# Patient Record
Sex: MALE | Race: BLACK OR AFRICAN AMERICAN | Employment: FULL TIME | URBAN - METROPOLITAN AREA
[De-identification: names, ages, dates, MRNs, and addresses within clinical notes are randomized per-mention and may not be internally consistent; named-entity substitution may affect disease eponyms.]

---

## 2018-04-27 PROBLEM — I10 ESSENTIAL HYPERTENSION: Status: ACTIVE | Noted: 2018-04-27

## 2018-04-27 PROBLEM — J30.1 NON-SEASONAL ALLERGIC RHINITIS DUE TO POLLEN: Status: ACTIVE | Noted: 2018-04-27

## 2018-05-02 ENCOUNTER — HOSPITAL ENCOUNTER (OUTPATIENT)
Dept: GENERAL RADIOLOGY | Age: 28
Discharge: HOME | End: 2018-05-02
Attending: INTERNAL MEDICINE
Payer: COMMERCIAL

## 2018-05-02 DIAGNOSIS — J40 BRONCHITIS: ICD-10-CM

## 2018-05-02 PROCEDURE — 71046 X-RAY EXAM CHEST 2 VIEWS: CPT

## 2018-05-08 ENCOUNTER — APPOINTMENT (OUTPATIENT)
Dept: GENERAL RADIOLOGY | Age: 28
DRG: 974 | End: 2018-05-08
Attending: EMERGENCY MEDICINE
Payer: COMMERCIAL

## 2018-05-08 ENCOUNTER — APPOINTMENT (OUTPATIENT)
Dept: CT IMAGING | Age: 28
DRG: 974 | End: 2018-05-08
Attending: INTERNAL MEDICINE
Payer: COMMERCIAL

## 2018-05-08 ENCOUNTER — HOSPITAL ENCOUNTER (INPATIENT)
Age: 28
LOS: 7 days | Discharge: HOME HEALTH | DRG: 974 | End: 2018-05-15
Attending: EMERGENCY MEDICINE | Admitting: INTERNAL MEDICINE
Payer: COMMERCIAL

## 2018-05-08 DIAGNOSIS — B59 PNEUMONIA OF BOTH LUNGS DUE TO PNEUMOCYSTIS JIROVECII, UNSPECIFIED PART OF LUNG (HCC): ICD-10-CM

## 2018-05-08 DIAGNOSIS — J18.9 COMMUNITY ACQUIRED PNEUMONIA, UNSPECIFIED LATERALITY: Primary | ICD-10-CM

## 2018-05-08 DIAGNOSIS — B59 PNEUMONIA DUE TO PNEUMOCYSTIS JIROVECII, UNSPECIFIED LATERALITY, UNSPECIFIED PART OF LUNG (HCC): ICD-10-CM

## 2018-05-08 DIAGNOSIS — R09.02 HYPOXIA: ICD-10-CM

## 2018-05-08 DIAGNOSIS — J96.01 ACUTE RESPIRATORY FAILURE WITH HYPOXIA (HCC): ICD-10-CM

## 2018-05-08 DIAGNOSIS — Z21 HIV POSITIVE (HCC): ICD-10-CM

## 2018-05-08 DIAGNOSIS — J45.902 ASTHMA WITH STATUS ASTHMATICUS, UNSPECIFIED ASTHMA SEVERITY, UNSPECIFIED WHETHER PERSISTENT: Chronic | ICD-10-CM

## 2018-05-08 DIAGNOSIS — I48.91 NEW ONSET A-FIB (HCC): ICD-10-CM

## 2018-05-08 DIAGNOSIS — R09.02 HYPOXEMIA: ICD-10-CM

## 2018-05-08 DIAGNOSIS — J18.9 PNEUMONIA OF BOTH LOWER LOBES DUE TO INFECTIOUS ORGANISM: ICD-10-CM

## 2018-05-08 DIAGNOSIS — I48.91 ATRIAL FIBRILLATION WITH RAPID VENTRICULAR RESPONSE (HCC): ICD-10-CM

## 2018-05-08 LAB
ALBUMIN SERPL-MCNC: 2 G/DL (ref 3.5–5)
ALBUMIN SERPL-MCNC: 2.1 G/DL (ref 3.5–5)
ALBUMIN/GLOB SERPL: 0.3 {RATIO} (ref 1.2–3.5)
ALBUMIN/GLOB SERPL: 0.3 {RATIO} (ref 1.2–3.5)
ALP SERPL-CCNC: 56 U/L (ref 50–136)
ALP SERPL-CCNC: 61 U/L (ref 50–136)
ALT SERPL-CCNC: 11 U/L (ref 12–65)
ALT SERPL-CCNC: 12 U/L (ref 12–65)
AMPHET UR QL SCN: NEGATIVE
ANION GAP SERPL CALC-SCNC: 8 MMOL/L (ref 7–16)
ANION GAP SERPL CALC-SCNC: 9 MMOL/L (ref 7–16)
APTT PPP: 40.8 SEC (ref 23.2–35.3)
AST SERPL-CCNC: 51 U/L (ref 15–37)
AST SERPL-CCNC: 51 U/L (ref 15–37)
ATRIAL RATE: 105 BPM
BARBITURATES UR QL SCN: NEGATIVE
BASOPHILS # BLD: 0 K/UL (ref 0–0.2)
BASOPHILS NFR BLD: 0 % (ref 0–2)
BENZODIAZ UR QL: NEGATIVE
BILIRUB SERPL-MCNC: 0.3 MG/DL (ref 0.2–1.1)
BILIRUB SERPL-MCNC: 0.4 MG/DL (ref 0.2–1.1)
BNP SERPL-MCNC: 16 PG/ML
BUN SERPL-MCNC: 25 MG/DL (ref 6–23)
BUN SERPL-MCNC: 27 MG/DL (ref 6–23)
CALCIUM SERPL-MCNC: 9.4 MG/DL (ref 8.3–10.4)
CALCIUM SERPL-MCNC: 9.5 MG/DL (ref 8.3–10.4)
CALCULATED R AXIS, ECG10: 91 DEGREES
CALCULATED T AXIS, ECG11: 0 DEGREES
CANNABINOIDS UR QL SCN: POSITIVE
CHLORIDE SERPL-SCNC: 107 MMOL/L (ref 98–107)
CHLORIDE SERPL-SCNC: 110 MMOL/L (ref 98–107)
CO2 SERPL-SCNC: 25 MMOL/L (ref 21–32)
CO2 SERPL-SCNC: 26 MMOL/L (ref 21–32)
COCAINE UR QL SCN: NEGATIVE
CREAT SERPL-MCNC: 1.04 MG/DL (ref 0.8–1.5)
CREAT SERPL-MCNC: 1.2 MG/DL (ref 0.8–1.5)
DIAGNOSIS, 93000: NORMAL
DIFFERENTIAL METHOD BLD: ABNORMAL
EOSINOPHIL # BLD: 0.1 K/UL (ref 0–0.8)
EOSINOPHIL NFR BLD: 1 % (ref 0.5–7.8)
ERYTHROCYTE [DISTWIDTH] IN BLOOD BY AUTOMATED COUNT: 13.1 % (ref 11.9–14.6)
ERYTHROCYTE [DISTWIDTH] IN BLOOD BY AUTOMATED COUNT: 13.2 % (ref 11.9–14.6)
FLUAV AG NPH QL IA: NEGATIVE
FLUBV AG NPH QL IA: NEGATIVE
GLOBULIN SER CALC-MCNC: 6.3 G/DL (ref 2.3–3.5)
GLOBULIN SER CALC-MCNC: 6.4 G/DL (ref 2.3–3.5)
GLUCOSE SERPL-MCNC: 108 MG/DL (ref 65–100)
GLUCOSE SERPL-MCNC: 92 MG/DL (ref 65–100)
HCT VFR BLD AUTO: 37.7 % (ref 41.1–50.3)
HCT VFR BLD AUTO: 40.4 % (ref 41.1–50.3)
HGB BLD-MCNC: 13.1 G/DL (ref 13.6–17.2)
HGB BLD-MCNC: 13.6 G/DL (ref 13.6–17.2)
IMM GRANULOCYTES # BLD: 0 K/UL (ref 0–0.5)
IMM GRANULOCYTES NFR BLD AUTO: 0 % (ref 0–5)
INR PPP: 1.3
LACTATE BLD-SCNC: 1 MMOL/L (ref 0.5–1.9)
LYMPHOCYTES # BLD: 1 K/UL (ref 0.5–4.6)
LYMPHOCYTES NFR BLD: 7 % (ref 13–44)
MAGNESIUM SERPL-MCNC: 2 MG/DL (ref 1.8–2.4)
MAGNESIUM SERPL-MCNC: 2.1 MG/DL (ref 1.8–2.4)
MCH RBC QN AUTO: 31.9 PG (ref 26.1–32.9)
MCH RBC QN AUTO: 32.7 PG (ref 26.1–32.9)
MCHC RBC AUTO-ENTMCNC: 33.7 G/DL (ref 31.4–35)
MCHC RBC AUTO-ENTMCNC: 34.7 G/DL (ref 31.4–35)
MCV RBC AUTO: 94 FL (ref 79.6–97.8)
MCV RBC AUTO: 94.8 FL (ref 79.6–97.8)
METHADONE UR QL: NEGATIVE
MONOCYTES # BLD: 0.5 K/UL (ref 0.1–1.3)
MONOCYTES NFR BLD: 4 % (ref 4–12)
NEUTS SEG # BLD: 12.3 K/UL (ref 1.7–8.2)
NEUTS SEG NFR BLD: 88 % (ref 43–78)
OPIATES UR QL: POSITIVE
PCP UR QL: POSITIVE
PLATELET # BLD AUTO: 270 K/UL (ref 150–450)
PLATELET # BLD AUTO: 309 K/UL (ref 150–450)
PMV BLD AUTO: 10.5 FL (ref 10.8–14.1)
PMV BLD AUTO: 10.6 FL (ref 10.8–14.1)
POTASSIUM SERPL-SCNC: 4.1 MMOL/L (ref 3.5–5.1)
POTASSIUM SERPL-SCNC: 4.2 MMOL/L (ref 3.5–5.1)
PROCALCITONIN SERPL-MCNC: 0.6 NG/ML
PROT SERPL-MCNC: 8.3 G/DL (ref 6.3–8.2)
PROT SERPL-MCNC: 8.5 G/DL (ref 6.3–8.2)
PROTHROMBIN TIME: 16.1 SEC (ref 11.5–14.5)
Q-T INTERVAL, ECG07: 252 MS
QRS DURATION, ECG06: 68 MS
QTC CALCULATION (BEZET), ECG08: 411 MS
RBC # BLD AUTO: 4.01 M/UL (ref 4.23–5.67)
RBC # BLD AUTO: 4.26 M/UL (ref 4.23–5.67)
SODIUM SERPL-SCNC: 141 MMOL/L (ref 136–145)
SODIUM SERPL-SCNC: 144 MMOL/L (ref 136–145)
TSH SERPL DL<=0.005 MIU/L-ACNC: 0.64 UIU/ML (ref 0.36–3.74)
VENTRICULAR RATE, ECG03: 160 BPM
WBC # BLD AUTO: 14 K/UL (ref 4.3–11.1)
WBC # BLD AUTO: 9.3 K/UL (ref 4.3–11.1)

## 2018-05-08 PROCEDURE — 85610 PROTHROMBIN TIME: CPT | Performed by: EMERGENCY MEDICINE

## 2018-05-08 PROCEDURE — 80053 COMPREHEN METABOLIC PANEL: CPT | Performed by: INTERNAL MEDICINE

## 2018-05-08 PROCEDURE — 84443 ASSAY THYROID STIM HORMONE: CPT | Performed by: EMERGENCY MEDICINE

## 2018-05-08 PROCEDURE — 87804 INFLUENZA ASSAY W/OPTIC: CPT | Performed by: INTERNAL MEDICINE

## 2018-05-08 PROCEDURE — 77030013140 HC MSK NEB VYRM -A

## 2018-05-08 PROCEDURE — 74011250637 HC RX REV CODE- 250/637: Performed by: EMERGENCY MEDICINE

## 2018-05-08 PROCEDURE — 83880 ASSAY OF NATRIURETIC PEPTIDE: CPT | Performed by: EMERGENCY MEDICINE

## 2018-05-08 PROCEDURE — 96366 THER/PROPH/DIAG IV INF ADDON: CPT | Performed by: EMERGENCY MEDICINE

## 2018-05-08 PROCEDURE — 86701 HIV-1ANTIBODY: CPT | Performed by: INTERNAL MEDICINE

## 2018-05-08 PROCEDURE — 71045 X-RAY EXAM CHEST 1 VIEW: CPT

## 2018-05-08 PROCEDURE — 80053 COMPREHEN METABOLIC PANEL: CPT | Performed by: EMERGENCY MEDICINE

## 2018-05-08 PROCEDURE — 36415 COLL VENOUS BLD VENIPUNCTURE: CPT | Performed by: EMERGENCY MEDICINE

## 2018-05-08 PROCEDURE — 96365 THER/PROPH/DIAG IV INF INIT: CPT | Performed by: EMERGENCY MEDICINE

## 2018-05-08 PROCEDURE — 84145 PROCALCITONIN (PCT): CPT | Performed by: EMERGENCY MEDICINE

## 2018-05-08 PROCEDURE — 85027 COMPLETE CBC AUTOMATED: CPT | Performed by: INTERNAL MEDICINE

## 2018-05-08 PROCEDURE — 65660000000 HC RM CCU STEPDOWN

## 2018-05-08 PROCEDURE — 96367 TX/PROPH/DG ADDL SEQ IV INF: CPT | Performed by: EMERGENCY MEDICINE

## 2018-05-08 PROCEDURE — 85730 THROMBOPLASTIN TIME PARTIAL: CPT | Performed by: EMERGENCY MEDICINE

## 2018-05-08 PROCEDURE — 87389 HIV-1 AG W/HIV-1&-2 AB AG IA: CPT | Performed by: INTERNAL MEDICINE

## 2018-05-08 PROCEDURE — 83735 ASSAY OF MAGNESIUM: CPT | Performed by: INTERNAL MEDICINE

## 2018-05-08 PROCEDURE — 74011000250 HC RX REV CODE- 250: Performed by: INTERNAL MEDICINE

## 2018-05-08 PROCEDURE — 71250 CT THORAX DX C-: CPT

## 2018-05-08 PROCEDURE — 74011000250 HC RX REV CODE- 250: Performed by: EMERGENCY MEDICINE

## 2018-05-08 PROCEDURE — 99285 EMERGENCY DEPT VISIT HI MDM: CPT | Performed by: EMERGENCY MEDICINE

## 2018-05-08 PROCEDURE — 94640 AIRWAY INHALATION TREATMENT: CPT

## 2018-05-08 PROCEDURE — 87040 BLOOD CULTURE FOR BACTERIA: CPT | Performed by: EMERGENCY MEDICINE

## 2018-05-08 PROCEDURE — 80307 DRUG TEST PRSMV CHEM ANLYZR: CPT | Performed by: INTERNAL MEDICINE

## 2018-05-08 PROCEDURE — 74011000258 HC RX REV CODE- 258: Performed by: INTERNAL MEDICINE

## 2018-05-08 PROCEDURE — 74011000258 HC RX REV CODE- 258: Performed by: EMERGENCY MEDICINE

## 2018-05-08 PROCEDURE — 83735 ASSAY OF MAGNESIUM: CPT | Performed by: EMERGENCY MEDICINE

## 2018-05-08 PROCEDURE — 74011250636 HC RX REV CODE- 250/636: Performed by: INTERNAL MEDICINE

## 2018-05-08 PROCEDURE — 83605 ASSAY OF LACTIC ACID: CPT

## 2018-05-08 PROCEDURE — 74011250636 HC RX REV CODE- 250/636: Performed by: EMERGENCY MEDICINE

## 2018-05-08 PROCEDURE — 74011250637 HC RX REV CODE- 250/637: Performed by: INTERNAL MEDICINE

## 2018-05-08 PROCEDURE — 93005 ELECTROCARDIOGRAM TRACING: CPT | Performed by: EMERGENCY MEDICINE

## 2018-05-08 PROCEDURE — 85025 COMPLETE CBC W/AUTO DIFF WBC: CPT | Performed by: EMERGENCY MEDICINE

## 2018-05-08 RX ORDER — GUAIFENESIN 600 MG/1
600 TABLET, EXTENDED RELEASE ORAL 2 TIMES DAILY
Status: DISCONTINUED | OUTPATIENT
Start: 2018-05-08 | End: 2018-05-13

## 2018-05-08 RX ORDER — DILTIAZEM HYDROCHLORIDE 5 MG/ML
20 INJECTION INTRAVENOUS
Status: COMPLETED | OUTPATIENT
Start: 2018-05-08 | End: 2018-05-08

## 2018-05-08 RX ORDER — ACETAMINOPHEN 325 MG/1
650 TABLET ORAL
Status: DISCONTINUED | OUTPATIENT
Start: 2018-05-08 | End: 2018-05-15 | Stop reason: HOSPADM

## 2018-05-08 RX ORDER — ALBUTEROL SULFATE 0.83 MG/ML
2.5 SOLUTION RESPIRATORY (INHALATION)
Status: DISCONTINUED | OUTPATIENT
Start: 2018-05-08 | End: 2018-05-08

## 2018-05-08 RX ORDER — BENZONATATE 100 MG/1
200 CAPSULE ORAL ONCE
Status: COMPLETED | OUTPATIENT
Start: 2018-05-08 | End: 2018-05-08

## 2018-05-08 RX ORDER — HYDROCODONE BITARTRATE AND HOMATROPINE METHYLBROMIDE 1.5; 5 MG/5ML; MG/5ML
5 SYRUP ORAL
Status: DISCONTINUED | OUTPATIENT
Start: 2018-05-08 | End: 2018-05-08 | Stop reason: SDUPTHER

## 2018-05-08 RX ORDER — HYDROCODONE BITARTRATE AND HOMATROPINE METHYLBROMIDE 1.5; 5 MG/5ML; MG/5ML
5 SYRUP ORAL
Status: DISCONTINUED | OUTPATIENT
Start: 2018-05-08 | End: 2018-05-15 | Stop reason: HOSPADM

## 2018-05-08 RX ORDER — VENLAFAXINE HYDROCHLORIDE 37.5 MG/1
37.5 CAPSULE, EXTENDED RELEASE ORAL DAILY
Status: DISCONTINUED | OUTPATIENT
Start: 2018-05-09 | End: 2018-05-11

## 2018-05-08 RX ORDER — TRAMADOL HYDROCHLORIDE 50 MG/1
50 TABLET ORAL
Status: DISCONTINUED | OUTPATIENT
Start: 2018-05-08 | End: 2018-05-15 | Stop reason: HOSPADM

## 2018-05-08 RX ORDER — BUDESONIDE 0.5 MG/2ML
500 INHALANT ORAL
Status: DISCONTINUED | OUTPATIENT
Start: 2018-05-08 | End: 2018-05-14

## 2018-05-08 RX ORDER — ACETAMINOPHEN 325 MG/1
650 TABLET ORAL
Status: COMPLETED | OUTPATIENT
Start: 2018-05-08 | End: 2018-05-08

## 2018-05-08 RX ORDER — HEPARIN SODIUM 5000 [USP'U]/ML
4000 INJECTION, SOLUTION INTRAVENOUS; SUBCUTANEOUS ONCE
Status: COMPLETED | OUTPATIENT
Start: 2018-05-08 | End: 2018-05-08

## 2018-05-08 RX ORDER — HEPARIN SODIUM 5000 [USP'U]/100ML
12-25 INJECTION, SOLUTION INTRAVENOUS
Status: DISCONTINUED | OUTPATIENT
Start: 2018-05-08 | End: 2018-05-11

## 2018-05-08 RX ORDER — DILTIAZEM HYDROCHLORIDE 5 MG/ML
25 INJECTION INTRAVENOUS
Status: COMPLETED | OUTPATIENT
Start: 2018-05-08 | End: 2018-05-08

## 2018-05-08 RX ORDER — SODIUM CHLORIDE 9 MG/ML
50 INJECTION, SOLUTION INTRAVENOUS CONTINUOUS
Status: DISCONTINUED | OUTPATIENT
Start: 2018-05-08 | End: 2018-05-13

## 2018-05-08 RX ORDER — LEVALBUTEROL INHALATION SOLUTION 0.63 MG/3ML
0.63 SOLUTION RESPIRATORY (INHALATION)
Status: DISCONTINUED | OUTPATIENT
Start: 2018-05-08 | End: 2018-05-14

## 2018-05-08 RX ORDER — TRAMADOL HYDROCHLORIDE 50 MG/1
100 TABLET ORAL
Status: COMPLETED | OUTPATIENT
Start: 2018-05-08 | End: 2018-05-08

## 2018-05-08 RX ADMIN — HEPARIN SODIUM AND DEXTROSE 12 UNITS/KG/HR: 5000; 5 INJECTION INTRAVENOUS at 18:55

## 2018-05-08 RX ADMIN — ALBUTEROL SULFATE 2.5 MG: 2.5 SOLUTION RESPIRATORY (INHALATION) at 19:27

## 2018-05-08 RX ADMIN — DILTIAZEM HYDROCHLORIDE 20 MG: 5 INJECTION INTRAVENOUS at 14:49

## 2018-05-08 RX ADMIN — DILTIAZEM HYDROCHLORIDE 25 MG: 5 INJECTION INTRAVENOUS at 15:54

## 2018-05-08 RX ADMIN — SODIUM CHLORIDE 15 MG/HR: 900 INJECTION, SOLUTION INTRAVENOUS at 21:48

## 2018-05-08 RX ADMIN — HEPARIN SODIUM 4000 UNITS: 5000 INJECTION, SOLUTION INTRAVENOUS; SUBCUTANEOUS at 18:55

## 2018-05-08 RX ADMIN — ACETAMINOPHEN 650 MG: 325 TABLET ORAL at 15:54

## 2018-05-08 RX ADMIN — FAMOTIDINE 20 MG: 10 INJECTION, SOLUTION INTRAVENOUS at 21:42

## 2018-05-08 RX ADMIN — BUDESONIDE 500 MCG: 0.5 INHALANT RESPIRATORY (INHALATION) at 19:27

## 2018-05-08 RX ADMIN — TRAMADOL HYDROCHLORIDE 100 MG: 50 TABLET, FILM COATED ORAL at 18:23

## 2018-05-08 RX ADMIN — GUAIFENESIN 600 MG: 600 TABLET, EXTENDED RELEASE ORAL at 18:23

## 2018-05-08 RX ADMIN — HYDROCODONE BITARTRATE AND HOMATROPINE METHYLBROMIDE 5 ML: 5; 1.5 SOLUTION ORAL at 21:42

## 2018-05-08 RX ADMIN — SODIUM CHLORIDE 15 MG/HR: 900 INJECTION, SOLUTION INTRAVENOUS at 18:23

## 2018-05-08 RX ADMIN — TRAMADOL HYDROCHLORIDE 50 MG: 50 TABLET, FILM COATED ORAL at 23:59

## 2018-05-08 RX ADMIN — BENZONATATE 200 MG: 100 CAPSULE ORAL at 15:48

## 2018-05-08 RX ADMIN — SODIUM CHLORIDE 100 ML/HR: 900 INJECTION, SOLUTION INTRAVENOUS at 18:29

## 2018-05-08 RX ADMIN — AZITHROMYCIN MONOHYDRATE 500 MG: 500 INJECTION, POWDER, LYOPHILIZED, FOR SOLUTION INTRAVENOUS at 17:22

## 2018-05-08 RX ADMIN — SODIUM CHLORIDE 10 MG/HR: 900 INJECTION, SOLUTION INTRAVENOUS at 14:54

## 2018-05-08 RX ADMIN — CEFTRIAXONE SODIUM 1 G: 1 INJECTION, POWDER, FOR SOLUTION INTRAMUSCULAR; INTRAVENOUS at 15:48

## 2018-05-08 RX ADMIN — SODIUM CHLORIDE 1000 ML: 900 INJECTION, SOLUTION INTRAVENOUS at 15:44

## 2018-05-08 NOTE — H&P
[address]      Audra Yeung  MR#: [id number]  : 1990  ACCOUNT #: [de-identified]   ADMIT DATE: 2018    CHIEF COMPLAINT:  Cough and shortness of breath. HISTORY OF PRESENT ILLNESS:  This is a 62-year-old male patient who has a past medical history of allergic rhinitis and asthma as a child, who came into the emergency room with a chief complaint of cough and severe shortness of breath. According to the patient, since the last week of 2018, he started having progressive shortness of breath and cough. He thought that it was an episode of asthma exacerbation secondary to the pollen in the environment. He visited an urgent care center and over there he was treated with a steroid taper and a Z-Tung. He was referred to a primary care office and he saw Dr. Anum Peña on 2018. At that time, the plan was to continue with the prednisone taper and to monitor his clinical progress. According to the patient, he persisted having worsening shortness of breath and he saw Dr. Anum Peña again on 2018. At that time, a chest x-ray was ordered due to persistence of symptoms. The chest x-ray done on 2018 did not show any evidence of any lung infiltrate. Unfortunately, the patient persisted having worsening symptoms. He says that he has lost his appetite, that he has had persistent and productive cough. When he wakes up in the morning his sputum is greenish, but during the day it clears up. He also complains of some episodes of chills and night sweats. The patient also reports wheezing mainly at night, nausea and several episodes of vomiting, the last one this morning. Today, he called Dr. Anum Peña and he advised the patient to come into the emergency room.   He activated the EMS and he was brought into the emergency room of [facility].  When he arrived here, his vital signs were blood pressure of 113/76, heart rate of 163, respiratory rate of 20, O2 saturation of 96. He had an irregularly irregular rate and rhythm. He had decreased breath sounds in both lung bases and his abdomen was soft and nontender. An EKG was done and showed atrial fibrillation with rapid ventricular response. His initial blood workup reported lactic acid of 1.0, a white blood cell count of 14, normal kidney function, procalcitonin of 0.6, and a BNP of 16. Chest x-ray was done and showed patchy bilateral airspace opacities, which could represent pulmonary edema or pneumonia. The patient received IV ceftriaxone and Zithromax in the emergency room. He also received 1 liter of normal saline and he was treated with 2 boluses of IV Cardizem and he was started on a Cardizem drip. He was presented to the hospitalist team to be admitted. REVIEW OF SYSTEMS:  A review of 14 systems was performed and it was negative except for the findings already reported in the HPI. PAST MEDICAL HISTORY:  1. Asthma. 2.  Allergic rhinitis. 3.  Migraines. PAST SURGICAL HISTORY:  None. SOCIAL HISTORY:  Former smoker, quit in 02/2018. Smokes marijuana. He drinks alcohol occasionally. FAMILY HISTORY:  Positive for diabetes, hypertension, migraines. ALLERGIES:  NO KNOWN DRUG ALLERGIES. PHYSICAL EXAMINATION:  VITAL SIGNS:  Blood pressure 118/60, heart rate of 128, respiratory rate of 20, O2 saturation of 96%. EYES:  PERRLA. EARS, NOSE, THROAT, MOUTH AND THROAT:  There is no evidence of pharyngeal erythema, edema or purulent exudates. RESPIRATORY:  Decreased breath sounds in both lung bases. HEART:  Irregularly irregular rate and rhythm. ABDOMEN:  Soft and nontender with positive bowel sounds. GENITOURINARY:  Unremarkable. MUSCULOSKELETAL:  No evidence of trauma. SKIN:  No evidence of active skin lesions. NEUROLOGIC:  Patient is alert and oriented with no evidence of focal weakness. PSYCHIATRIC:  Normal mood.   HEMATOLOGIC/LYMPHATIC/IMMUNOLOGIC: He has enlarged lymphadenopathies in the cervical area. LABS AND IMAGE RESULTS:  White blood cell count 14.0, hemoglobin 13.1, platelet count 289. Sodium 141, potassium 4.2, anion gap 8, creatinine 1.20, magnesium 2.0, total bilirubin 0.3, albumin 2.1, ALT 12, AST 51. Procalcitonin 0.6. BNP 16. TSH 0.63. Blood cultures in progress. Chest x-ray seen and analyzed by me:  Bilateral patchy infiltrates in both hilar areas. EKG seen and analyzed by me:  Atrial fibrillation with rapid ventricular response, no evidence of acute ischemia. ASSESSMENT:  This is a 23-year-old male patient who came into the emergency room complaining of shortness of breath and was found to have atrial fibrillation with rapid ventricular response and bilateral lung infiltrates. He is at high risk of further complications. He is going to be admitted to the hospital and his length of stay is calculated to be more than 2 midnights. PLAN:  1. Possible bilateral pneumonia. The patient is febrile, he has bilateral patchy infiltrates and he has an elevated white blood cell count. His procalcitonin is also slightly elevated. I think that there is enough clinical base to treat him as a community-acquired pneumonia. A CT scan of the chest has been ordered because there is some suspicion reported in the chest x-ray for pulmonary edema. Clinically, he does not seem to be overloaded and his BNP is within normal limits and I doubt he has congestive heart failure. A chest ct scan has been ordered, if images are not suggestive of fluid overload, will start him on maintenance IV fluids. An echocardiogram has also been ordered. Blood cultures have been drawn. Due to his age and the severity of the pneumonia I have ordered an HIV test. Nebs with Xopenex ordered. 2.  Atrial fibrillation/ atrial flutter with rapid ventricular response. This is a new onset atrial fibrillation.   He has been started on a Cardizem drip, and he will also be started on heparin drip. Cardizem to be titrated. An echocardiogram has been ordered. Cardiology has been consulted. 3.  Deep venous thrombosis prophylaxis. IV heparin.       MD CLIFTON Henley/[place]  D: 05/08/2018 17:42     T: 05/08/2018 18:20  JOB #: [id number]

## 2018-05-08 NOTE — IP AVS SNAPSHOT
[address] 
[phone] Patient: Brannon Gil MRN: [id number] :1990 A check walker indicates which time of day the medication should be taken. My Medications START taking these medications Instructions Each Dose to Equal  
 Morning Noon Evening Bedtime  
 apixaban 5 mg tablet Commonly known as:  Alvera Gouge Take 1 Tab by mouth every twelve (12) hours for 30 days. 5 mg  
    
  
   
   
   
  
  
 azithromycin 600 mg tablet Commonly known as:  Magdiel Clos Take 2 Tabs by mouth every seven (7) days. 1200 mg  
    
   
   
   
  
 dilTIAZem  mg ER capsule Commonly known as:  CARDIZEM CD Take 1 Cap by mouth two (2) times a day for 30 days. 180 mg  
    
  
   
   
  
   
  
 metoprolol tartrate 75 mg Tab Take 75 mg by mouth every twelve (12) hours for 30 days. 75 mg  
    
  
   
   
   
  
  
 * predniSONE 20 mg tablet Commonly known as:  Samuel Rucker Start taking on:  2018 Take 2 Tabs by mouth daily (with breakfast) for 5 days. 40 mg  
    
  
   
   
   
  
 * predniSONE 20 mg tablet Commonly known as:  Samuel Rucker Start taking on:  2018 Take 1 Tab by mouth daily (with breakfast) for 14 days. 20 mg  
    
  
   
   
   
  
 trimethoprim-sulfamethoxazole 160-800 mg per tablet Commonly known as:  BACTRIM DS, SEPTRA DS Take 2 Tabs by mouth every eight (8) hours for 15 days. 2 Tab * Notice: This list has 2 medication(s) that are the same as other medications prescribed for you. Read the directions carefully, and ask your doctor or other care provider to review them with you. CHANGE how you take these medications Instructions Each Dose to Equal  
 Morning Noon Evening Bedtime * albuterol 90 mcg/actuation inhaler Commonly known as:  PROVENTIL HFA, VENTOLIN HFA, PROAIR HFA  
 What changed:  Another medication with the same name was added. Make sure you understand how and when to take each. Take 2 Puffs by inhalation every four (4) hours as needed for Wheezing. 2 Puff * albuterol 90 mcg/actuation inhaler Commonly known as:  PROVENTIL HFA, VENTOLIN HFA, PROAIR HFA What changed: You were already taking a medication with the same name, and this prescription was added. Make sure you understand how and when to take each. Take 2 Puffs by inhalation three (3) times daily. 2 Puff * Notice: This list has 2 medication(s) that are the same as other medications prescribed for you. Read the directions carefully, and ask your doctor or other care provider to review them with you. STOP taking these medications BREO ELLIPTA 100-25 mcg/dose inhaler Generic drug:  fluticasone-vilanterol CLARITIN 10 mg tablet Generic drug:  loratadine HYDROcodone-homatropine 5-1.5 mg/5 mL syrup Commonly known as:  HYCODAN  
   
  
 MUCINEX 600 mg ER tablet Generic drug:  guaiFENesin ER Where to Get Your Medications Information on where to get these meds will be given to you by the nurse or doctor. ! Ask your nurse or doctor about these medications  
  albuterol 90 mcg/actuation inhaler  
 apixaban 5 mg tablet  
 azithromycin 600 mg tablet  
 dilTIAZem  mg ER capsule  
 metoprolol tartrate 75 mg Tab  
 predniSONE 20 mg tablet  
 predniSONE 20 mg tablet  
 trimethoprim-sulfamethoxazole 160-800 mg per tablet

## 2018-05-08 NOTE — ED PROVIDER NOTES
HPI Comments: 79-year-old male is brought in by EMS. His symptoms started 2 weeks ago  With some chest pain cough and congestion. He was on Zithromax and prednisone at urgent care center. He followed up with his doctor about a week ago was placed on some cough medication. He seemed to have worsening chest pain and shortness of breath today. He called his doctor advised him to come in. He felt bad enough that he called EMS. They found him to be tachycardic in excess of 150. Patient is a 28 y.o. male presenting with palpitations and shortness of breath. The history is provided by the patient. Palpitations    This is a new problem. The current episode started more than 2 days ago. The problem has been gradually worsening. The problem occurs constantly. Associated symptoms include chest pain, near-syncope, nausea, vomiting, cough and shortness of breath. Pertinent negatives include no diaphoresis, no fever, no numbness, no chest pressure, no exertional chest pressure, no abdominal pain, no headaches, no back pain, no lower extremity edema and no dizziness. Risk factors include smoking/tobacco exposure. Shortness of Breath   Associated symptoms include cough, chest pain and vomiting. Pertinent negatives include no fever, no headaches, no neck pain, no abdominal pain and no rash. Past Medical History:   Diagnosis Date    Asthma     Migraines        History reviewed. No pertinent surgical history. Family History:   Problem Relation Age of Onset    Cancer Mother 52     breast     Hypertension Mother     Diabetes Father    Felicity Tolley Migraines Sister     Other Brother      chrones       Social History     Social History    Marital status: SINGLE     Spouse name: N/A    Number of children: N/A    Years of education: N/A     Occupational History    Not on file.      Social History Main Topics    Smoking status: Former Smoker     Packs/day: 1.00     Years: 5.00     Types: Cigarettes     Quit date: 02/2018    Smokeless tobacco: Never Used    Alcohol use Yes      Comment: occ.  Drug use: Yes     Special: Marijuana    Sexual activity: Yes     Partners: Male     Birth control/ protection: Condom     Other Topics Concern    Not on file     Social History Narrative         ALLERGIES: Review of patient's allergies indicates no known allergies. Review of Systems   Constitutional: Negative for chills, diaphoresis and fever. Respiratory: Positive for cough and shortness of breath. Cardiovascular: Positive for chest pain, palpitations and near-syncope. Gastrointestinal: Positive for nausea and vomiting. Negative for abdominal pain and diarrhea. Genitourinary: Negative for dysuria and flank pain. Musculoskeletal: Negative for back pain and neck pain. Skin: Negative for color change and rash. Neurological: Negative for dizziness, syncope, numbness and headaches. All other systems reviewed and are negative. Vitals:    05/08/18 1441 05/08/18 1451 05/08/18 1453 05/08/18 1454   BP:  119/82 128/77 117/64   Pulse:       Resp:       SpO2:    92%   Weight: 72.6 kg (160 lb)      Height: 6' 2\" (1.88 m)               Physical Exam   Constitutional: He is oriented to person, place, and time. He appears well-developed and well-nourished. No distress. HENT:   Head: Normocephalic and atraumatic. Mouth/Throat: Oropharynx is clear and moist. No oropharyngeal exudate. Eyes: Conjunctivae and EOM are normal. Pupils are equal, round, and reactive to light. Neck: Normal range of motion. Neck supple. Cardiovascular: Intact distal pulses. An irregularly irregular rhythm present. Tachycardia present. No murmur heard. Pulmonary/Chest: No respiratory distress. He has wheezes. He has rales. Abdominal: Soft. Bowel sounds are normal. He exhibits no mass. There is no tenderness. There is no rebound and no guarding. No hernia. Neurological: He is alert and oriented to person, place, and time.  Gait normal.   Nl speech   Skin: Skin is warm and dry. Psychiatric: He has a normal mood and affect. His speech is normal.   Nursing note and vitals reviewed. MDM  Number of Diagnoses or Management Options  Diagnosis management comments: Tick EKG for atrial fibrillation. Check chest x-ray for pneumonia or congestive failure or effusion. Screening blood work. Amount and/or Complexity of Data Reviewed  Clinical lab tests: ordered and reviewed  Tests in the radiology section of CPT®: ordered and reviewed  Tests in the medicine section of CPT®: ordered and reviewed  Review and summarize past medical records: yes  Discuss the patient with other providers: yes  Independent visualization of images, tracings, or specimens: yes    Risk of Complications, Morbidity, and/or Mortality  Presenting problems: moderate  Diagnostic procedures: low  Management options: moderate  General comments: EKG shows atrial fibrillation fast ventricular response at 160. Nonspecific T changes. No ST changes. QRS is narrow    Patient Progress  Patient progress: stable        ED Course       Procedures    Results Include:    Recent Results (from the past 24 hour(s))   EKG, 12 LEAD, INITIAL    Collection Time: 05/08/18  2:36 PM   Result Value Ref Range    Ventricular Rate 160 BPM    Atrial Rate 105 BPM    QRS Duration 68 ms    Q-T Interval 252 ms    QTC Calculation (Bezet) 411 ms    Calculated R Axis 91 degrees    Calculated T Axis 0 degrees    Diagnosis       !! AGE AND GENDER SPECIFIC ECG ANALYSIS !!   Atrial fibrillation with rapid ventricular response  Rightward axis  Nonspecific T wave abnormality , probably digitalis effect  Abnormal ECG  No previous ECGs available     CBC WITH AUTOMATED DIFF    Collection Time: 05/08/18  2:45 PM   Result Value Ref Range    WBC 14.0 (H) 4.3 - 11.1 K/uL    RBC 4.01 (L) 4.23 - 5.67 M/uL    HGB 13.1 (L) 13.6 - 17.2 g/dL    HCT 37.7 (L) 41.1 - 50.3 %    MCV 94.0 79.6 - 97.8 FL    MCH 32.7 26.1 - 32.9 PG    MCHC 34.7 31.4 - 35.0 g/dL    RDW 13.1 11.9 - 14.6 %    PLATELET 560 815 - 234 K/uL    MPV 10.5 (L) 10.8 - 14.1 FL    DF AUTOMATED      NEUTROPHILS 88 (H) 43 - 78 %    LYMPHOCYTES 7 (L) 13 - 44 %    MONOCYTES 4 4.0 - 12.0 %    EOSINOPHILS 1 0.5 - 7.8 %    BASOPHILS 0 0.0 - 2.0 %    IMMATURE GRANULOCYTES 0 0.0 - 5.0 %    ABS. NEUTROPHILS 12.3 (H) 1.7 - 8.2 K/UL    ABS. LYMPHOCYTES 1.0 0.5 - 4.6 K/UL    ABS. MONOCYTES 0.5 0.1 - 1.3 K/UL    ABS. EOSINOPHILS 0.1 0.0 - 0.8 K/UL    ABS. BASOPHILS 0.0 0.0 - 0.2 K/UL    ABS. IMM. GRANS. 0.0 0.0 - 0.5 K/UL   METABOLIC PANEL, COMPREHENSIVE    Collection Time: 05/08/18  2:45 PM   Result Value Ref Range    Sodium 141 136 - 145 mmol/L    Potassium 4.2 3.5 - 5.1 mmol/L    Chloride 107 98 - 107 mmol/L    CO2 26 21 - 32 mmol/L    Anion gap 8 7 - 16 mmol/L    Glucose 92 65 - 100 mg/dL    BUN 27 (H) 6 - 23 MG/DL    Creatinine 1.20 0.8 - 1.5 MG/DL    GFR est AA >60 >60 ml/min/1.73m2    GFR est non-AA >60 >60 ml/min/1.73m2    Calcium 9.5 8.3 - 10.4 MG/DL    Bilirubin, total 0.3 0.2 - 1.1 MG/DL    ALT (SGPT) 12 12 - 65 U/L    AST (SGOT) 51 (H) 15 - 37 U/L    Alk.  phosphatase 61 50 - 136 U/L    Protein, total 8.5 (H) 6.3 - 8.2 g/dL    Albumin 2.1 (L) 3.5 - 5.0 g/dL    Globulin 6.4 (H) 2.3 - 3.5 g/dL    A-G Ratio 0.3 (L) 1.2 - 3.5     MAGNESIUM    Collection Time: 05/08/18  2:45 PM   Result Value Ref Range    Magnesium 2.0 1.8 - 2.4 mg/dL   PROTHROMBIN TIME + INR    Collection Time: 05/08/18  2:45 PM   Result Value Ref Range    Prothrombin time 16.1 (H) 11.5 - 14.5 sec    INR 1.3     PTT    Collection Time: 05/08/18  2:45 PM   Result Value Ref Range    aPTT 40.8 (H) 23.2 - 35.3 SEC   BNP    Collection Time: 05/08/18  2:45 PM   Result Value Ref Range    BNP 16 pg/mL   TSH 3RD GENERATION    Collection Time: 05/08/18  2:45 PM   Result Value Ref Range    TSH 0.639 0.358 - 3.740 uIU/mL   POC LACTIC ACID    Collection Time: 05/08/18  4:03 PM   Result Value Ref Range    Lactic Acid (POC) 1.0 0.5 - 1.9 mmol/L     Xr Chest Port    Result Date: 5/8/2018  Portable chest: History: Chronic chest pain and cough Comparison: 05/20/2018 Findings: A single view of the chest was obtained at 1457 hours. . The cardiac and mediastinal silhouette are normal in size and configuration. Moderate patchy bilateral airspace opacities are present. There are no pleural effusions. Impression: Patchy bilateral airspace opacities could represent pulmonary edema or pneumonia.     ===================================================================  This patient is critically ill and there is a high probability of of imminent or life threatening deterioration in the patient's condition without immediate management. The nature of the patient's clinical problem is: hypoxemia, pneumonia, atrial fibrillation rapid ventricular response    I have spent 50 minutes in direct patient care, documentation, review of labs/xrays/old records, discussion with Family, Colleague . The time involved in the performance of separately reportable procedures was not counted toward critical care time. Naida Walters MD; 5/8/2018 @4:08 PM  ===================================================================    Results Include:    Recent Results (from the past 24 hour(s))   EKG, 12 LEAD, INITIAL    Collection Time: 05/08/18  2:36 PM   Result Value Ref Range    Ventricular Rate 160 BPM    Atrial Rate 105 BPM    QRS Duration 68 ms    Q-T Interval 252 ms    QTC Calculation (Bezet) 411 ms    Calculated R Axis 91 degrees    Calculated T Axis 0 degrees    Diagnosis       !! AGE AND GENDER SPECIFIC ECG ANALYSIS !!   Atrial fibrillation with rapid ventricular response  Rightward axis  Nonspecific T wave abnormality , probably digitalis effect  Abnormal ECG  No previous ECGs available     CBC WITH AUTOMATED DIFF    Collection Time: 05/08/18  2:45 PM   Result Value Ref Range    WBC 14.0 (H) 4.3 - 11.1 K/uL    RBC 4.01 (L) 4.23 - 5.67 M/uL    HGB 13.1 (L) 13.6 - 17.2 g/dL    HCT 37.7 (L) 41.1 - 50.3 %    MCV 94.0 79.6 - 97.8 FL    MCH 32.7 26.1 - 32.9 PG    MCHC 34.7 31.4 - 35.0 g/dL    RDW 13.1 11.9 - 14.6 %    PLATELET 668 496 - 146 K/uL    MPV 10.5 (L) 10.8 - 14.1 FL    DF AUTOMATED      NEUTROPHILS 88 (H) 43 - 78 %    LYMPHOCYTES 7 (L) 13 - 44 %    MONOCYTES 4 4.0 - 12.0 %    EOSINOPHILS 1 0.5 - 7.8 %    BASOPHILS 0 0.0 - 2.0 %    IMMATURE GRANULOCYTES 0 0.0 - 5.0 %    ABS. NEUTROPHILS 12.3 (H) 1.7 - 8.2 K/UL    ABS. LYMPHOCYTES 1.0 0.5 - 4.6 K/UL    ABS. MONOCYTES 0.5 0.1 - 1.3 K/UL    ABS. EOSINOPHILS 0.1 0.0 - 0.8 K/UL    ABS. BASOPHILS 0.0 0.0 - 0.2 K/UL    ABS. IMM. GRANS. 0.0 0.0 - 0.5 K/UL   METABOLIC PANEL, COMPREHENSIVE    Collection Time: 05/08/18  2:45 PM   Result Value Ref Range    Sodium 141 136 - 145 mmol/L    Potassium 4.2 3.5 - 5.1 mmol/L    Chloride 107 98 - 107 mmol/L    CO2 26 21 - 32 mmol/L    Anion gap 8 7 - 16 mmol/L    Glucose 92 65 - 100 mg/dL    BUN 27 (H) 6 - 23 MG/DL    Creatinine 1.20 0.8 - 1.5 MG/DL    GFR est AA >60 >60 ml/min/1.73m2    GFR est non-AA >60 >60 ml/min/1.73m2    Calcium 9.5 8.3 - 10.4 MG/DL    Bilirubin, total 0.3 0.2 - 1.1 MG/DL    ALT (SGPT) 12 12 - 65 U/L    AST (SGOT) 51 (H) 15 - 37 U/L    Alk.  phosphatase 61 50 - 136 U/L    Protein, total 8.5 (H) 6.3 - 8.2 g/dL    Albumin 2.1 (L) 3.5 - 5.0 g/dL    Globulin 6.4 (H) 2.3 - 3.5 g/dL    A-G Ratio 0.3 (L) 1.2 - 3.5     MAGNESIUM    Collection Time: 05/08/18  2:45 PM   Result Value Ref Range    Magnesium 2.0 1.8 - 2.4 mg/dL   PROTHROMBIN TIME + INR    Collection Time: 05/08/18  2:45 PM   Result Value Ref Range    Prothrombin time 16.1 (H) 11.5 - 14.5 sec    INR 1.3     PTT    Collection Time: 05/08/18  2:45 PM   Result Value Ref Range    aPTT 40.8 (H) 23.2 - 35.3 SEC   BNP    Collection Time: 05/08/18  2:45 PM   Result Value Ref Range    BNP 16 pg/mL   TSH 3RD GENERATION    Collection Time: 05/08/18  2:45 PM   Result Value Ref Range    TSH 0.639 0.358 - 3.740 uIU/mL POC LACTIC ACID    Collection Time: 05/08/18  4:03 PM   Result Value Ref Range    Lactic Acid (POC) 1.0 0.5 - 1.9 mmol/L     4:08 PM  With fluids and Cardizem, heart rate now in the 120s. Patient sees more comfortable. Placed on oxygen as well. I suspect the patient has an atypical pneumonia and this resulted in atrial fibrillation with a rapid ventricular response. Blood cultures have been done. IV antibiotics ordered. We'll discuss with hospitalist regarding admission. Patient  Mode her. Denies any chronic viral infections.

## 2018-05-08 NOTE — IP AVS SNAPSHOT
[facility] 
 
 
 [address] [facility] [address] [facility] 
[phone] Patient: Hernan Guevara MRN: [id number] :1990 About your hospitalization You were admitted on: May 8, 2018 You last received care in the:  [facility] 3 TELEMETRY You were discharged on:  May 15, 2018 Why you were hospitalized Your primary diagnosis was:  Pcp (Pneumocystis Carinii Pneumonia) (Hcc) Your diagnoses also included:  New Onset A-Fib (Hcc), Hypoxia, Hiv Positive (Hcc), Respiratory Failure With Hypoxia (Hcc), Sepsis (Hcc) Follow-up Information Follow up With Details Comments Contact Info Georges OFFICE Call on 2018 at 9:30am. [address] 
[address] [address] [facility] 
[phone] Jenny Chester MD  [address] [facility]  [email or web address] Provider  [email or web address] [address] [place] [address] [address] [place] [id number] 
[phone] [facility] On 2018 at [address] [facility] [address] [place] [address] 
[phone] Everton Brasher MD   [address] [facility] [address] 
[phone] Your Scheduled Appointments Tuesday May 22, 2018  7:15 AM EDT  
LAB with [facility] [address] [place] [address] ([address] [place] [address]) [address] [place] [address]  
[phone] Thursday May 31, 2018  3:00 PM EDT Office Visit with Everton Brasher MD  
[address] [place] [address] ([address] [place] [address]) [address] [place] [address]  
[phone] 2018  9:30 AM EDT HOSPITAL FOLLOW-UP with Jaida Cope, [address] [place] [address] ([address] [facility]) [address] 
[address] [place] [address]  
[phone] 2018 11:20 AM EDT  
(Arrive by 10:50 AM) HOSPITAL with EDMUNDO Dunham  
 [facility] ([facility]) [address] [place] [facility]  
[phone] Discharge Orders None A check walker indicates which time of day the medication should be taken. My Medications START taking these medications Instructions Each Dose to Equal  
 Morning Noon Evening Bedtime  
 apixaban 5 mg tablet Commonly known as:  Artice Cuong Take 1 Tab by mouth every twelve (12) hours for 30 days. 5 mg  
    
  
   
   
   
  
  
 azithromycin 600 mg tablet Commonly known as:  Carlos [place] Take 2 Tabs by mouth every seven (7) days. 1200 mg  
    
   
   
   
  
 dilTIAZem  mg ER capsule Commonly known as:  CARDIZEM CD Take 1 Cap by mouth two (2) times a day for 30 days. 180 mg  
    
  
   
   
  
   
  
 metoprolol tartrate 75 mg Tab Take 75 mg by mouth every twelve (12) hours for 30 days. 75 mg  
    
  
   
   
   
  
  
 * predniSONE 20 mg tablet Commonly known as:  Mihaelane Jamshid Start taking on:  5/16/2018 Take 2 Tabs by mouth daily (with breakfast) for 5 days. 40 mg  
    
  
   
   
   
  
 * predniSONE 20 mg tablet Commonly known as:  Leverne Jamshid Start taking on:  5/20/2018 Take 1 Tab by mouth daily (with breakfast) for 14 days. 20 mg  
    
  
   
   
   
  
 trimethoprim-sulfamethoxazole 160-800 mg per tablet Commonly known as:  BACTRIM DS, SEPTRA DS Take 2 Tabs by mouth every eight (8) hours for 15 days. 2 Tab * Notice: This list has 2 medication(s) that are the same as other medications prescribed for you. Read the directions carefully, and ask your doctor or other care provider to review them with you. CHANGE how you take these medications Instructions Each Dose to Equal  
 Morning Noon Evening Bedtime * albuterol 90 mcg/actuation inhaler Commonly known as:  PROVENTIL HFA, VENTOLIN HFA, PROAIR HFA What changed:  Another medication with the same name was added. Make sure you understand how and when to take each. Take 2 Puffs by inhalation every four (4) hours as needed for Wheezing. 2 Puff * albuterol 90 mcg/actuation inhaler Commonly known as:  PROVENTIL HFA, VENTOLIN HFA, PROAIR HFA What changed: You were already taking a medication with the same name, and this prescription was added. Make sure you understand how and when to take each. Take 2 Puffs by inhalation three (3) times daily. 2 Puff * Notice: This list has 2 medication(s) that are the same as other medications prescribed for you. Read the directions carefully, and ask your doctor or other care provider to review them with you. STOP taking these medications BREO ELLIPTA 100-25 mcg/dose inhaler Generic drug:  fluticasone-vilanterol CLARITIN 10 mg tablet Generic drug:  loratadine HYDROcodone-homatropine 5-1.5 mg/5 mL syrup Commonly known as:  HYCODAN  
   
  
 MUCINEX 600 mg ER tablet Generic drug:  guaiFENesin ER Where to Get Your Medications Information on where to get these meds will be given to you by the nurse or doctor. ! Ask your nurse or doctor about these medications  
  albuterol 90 mcg/actuation inhaler  
 apixaban 5 mg tablet  
 azithromycin 600 mg tablet  
 dilTIAZem  mg ER capsule  
 metoprolol tartrate 75 mg Tab  
 predniSONE 20 mg tablet  
 predniSONE 20 mg tablet  
 trimethoprim-sulfamethoxazole 160-800 mg per tablet Discharge Instructions Home O2 3LNC Pneumonia: Care Instructions Your Care Instructions Pneumonia is an infection of the lungs. Most cases are caused by infections from bacteria or viruses. Pneumonia may be mild or very severe.  If it is caused by bacteria, you will be treated with antibiotics. It may take a few weeks to a few months to recover fully from pneumonia, depending on how sick you were and whether your overall health is good. Follow-up care is a key part of your treatment and safety. Be sure to make and go to all appointments, and call your doctor if you are having problems. It's also a good idea to know your test results and keep a list of the medicines you take. How can you care for yourself at home? · Take your antibiotics exactly as directed. Do not stop taking the medicine just because you are feeling better. You need to take the full course of antibiotics. · Take your medicines exactly as prescribed. Call your doctor if you think you are having a problem with your medicine. · Get plenty of rest and sleep. You may feel weak and tired for a while, but your energy level will improve with time. · To prevent dehydration, drink plenty of fluids, enough so that your urine is light yellow or clear like water. Choose water and other caffeine-free clear liquids until you feel better. If you have kidney, heart, or liver disease and have to limit fluids, talk with your doctor before you increase the amount of fluids you drink. · Take care of your cough so you can rest. A cough that brings up mucus from your lungs is common with pneumonia. It is one way your body gets rid of the infection. But if coughing keeps you from resting or causes severe fatigue and chest-wall pain, talk to your doctor. He or she may suggest that you take a medicine to reduce the cough. · Use a vaporizer or humidifier to add moisture to your bedroom. Follow the directions for cleaning the machine. · Do not smoke or allow others to smoke around you. Smoke will make your cough last longer. If you need help quitting, talk to your doctor about stop-smoking programs and medicines. These can increase your chances of quitting for good. · Take an over-the-counter pain medicine, such as acetaminophen (Tylenol), ibuprofen (Advil, Motrin), or naproxen (Aleve). Read and follow all instructions on the label. · Do not take two or more pain medicines at the same time unless the doctor told you to. Many pain medicines have acetaminophen, which is Tylenol. Too much acetaminophen (Tylenol) can be harmful. · If you were given a spirometer to measure how well your lungs are working, use it as instructed. This can help your doctor tell how your recovery is going. · To prevent pneumonia in the future, talk to your doctor about getting a flu vaccine (once a year) and a pneumococcal vaccine (one time only for most people). When should you call for help? Call 911 anytime you think you may need emergency care. For example, call if: 
? · You have severe trouble breathing. ?Call your doctor now or seek immediate medical care if: 
? · You cough up dark brown or bloody mucus (sputum). ? · You have new or worse trouble breathing. ? · You are dizzy or lightheaded, or you feel like you may faint. ? Watch closely for changes in your health, and be sure to contact your doctor if: 
? · You have a new or higher fever. ? · You are coughing more deeply or more often. ? · You are not getting better after 2 days (48 hours). ? · You do not get better as expected. Where can you learn more? Go to http://hope-obinna.info/. Enter [id number] in the search box to learn more about \"Pneumonia: Care Instructions. \" Current as of: May 12, 2017 Content Version: 11.4 © 0237-8735 [employer]. Care instructions adapted under license by [employer] (which disclaims liability or warranty for this information). If you have questions about a medical condition or this instruction, always ask your healthcare professional. [address] any warranty or liability for your use of this information. Mashed Pixel Announcement We are excited to announce that we are making your provider's discharge notes available to you in Mashed Pixel. You will see these notes when they are completed and signed by the physician that discharged you from your recent hospital stay. If you have any questions or concerns about any information you see in Mashed Pixel, please call the Health Information Department where you were seen or reach out to your Primary Care Provider for more information about your plan of care. Introducing [facility]! Dear Wiley Rainey: Thank you for requesting a Mashed Pixel account. Our records indicate that you already have an active Mashed Pixel account. You can access your account anytime at [email or web address] Did you know that you can access your hospital and ER discharge instructions at any time in Mashed Pixel? You can also review all of your test results from your hospital stay or ER visit. Additional Information If you have questions, please visit the Frequently Asked Questions section of the Mashed Pixel website at [email or web address] Remember, Mashed Pixel is NOT to be used for urgent needs. For medical emergencies, dial 911. Now available from your iPhone and Android! Introducing Ramana Louie As a [facility] patient, I wanted to make you aware of our electronic visit tool called Ramana Andersenfin. Chanel Gambino 24/7 allows you to connect within minutes with a medical provider 24 hours a day, seven days a week via a mobile device or tablet or logging into a secure website from your computer. You can access Ramana Jaquanbethfin from anywhere in the [place].  
 
A virtual visit might be right for you when you have a simple condition and feel like you just dont want to get out of bed, or cant get away from work for an appointment, when your regular [facility] provider is not available (evenings, weekends or holidays), or when youre out of town and need minor care. Electronic visits cost only $49 and if the [employer] 24/VerbalizeIt provider determines a prescription is needed to treat your condition, one can be electronically transmitted to a nearby pharmacy*. Please take a moment to enroll today if you have not already done so. The enrollment process is free and takes just a few minutes. To enroll, please download the [employer]/VerbalizeIt fidel to your tablet or phone, or visit [email or web address] to enroll on your computer. And, as an [address] [place] [address] patient with a [employer]Social account, the results of your visits will be scanned into your electronic medical record and your primary care provider will be able to view the scanned results. We urge you to continue to see your regular [facility] provider for your ongoing medical care. And while your primary care provider may not be the one available when you seek a Siriona virtual visit, the peace of mind you get from getting a real diagnosis real time can be priceless. For more information on Siriona, view our Frequently Asked Questions (FAQs) at [email or web address]. Sincerely, 
 
Fadia Ferris MD 
Chief Medical Officer [employer] *:  certain medications cannot be prescribed via Siriona Unresulted Labs-Please follow up with your PCP about these lab tests Order Current Status Kierra Avon In process QUANTIFERON TB GOLD In process TOXOPLASMA GONDII AB, IGG In process Providers Seen During Your Hospitalization Provider Specialty Primary office phone Elodia Hutson MD Emergency Medicine [phone] Neftali Kidd MD Internal Medicine [phone] Your Primary Care Physician (PCP) Primary Care Physician Office Phone Office Fax Vinod Gupta [phone] You are allergic to the following No active allergies Recent Documentation Height Weight BMI Smoking Status 1.88 m 66.7 kg 18.87 kg/m2 Former Smoker Emergency Contacts Name Discharge Info Relation Home Work Mobile [facility]  Mother [phone] Patient Belongings The following personal items are in your possession at time of discharge: 
  Dental Appliances: None  Visual Aid: None      Home Medications: Kept at bedside (Primary RN notified to review medications)   Jewelry: With patient (pt stated he has jewelry in his luggage in closet )  Clothing: Pants, Footwear, Shirt, Undergarments, With patient    Other Valuables: Cell Phone, Renée Manbharati, With patient Please provide this summary of care documentation to your next provider. Signatures-by signing, you are acknowledging that this After Visit Summary has been reviewed with you and you have received a copy. Patient Signature:  ____________________________________________________________ Date:  ____________________________________________________________  
  
Caleb Rothman Provider Signature:  ____________________________________________________________ Date:  ____________________________________________________________

## 2018-05-09 ENCOUNTER — APPOINTMENT (OUTPATIENT)
Dept: GENERAL RADIOLOGY | Age: 28
DRG: 974 | End: 2018-05-09
Attending: INTERNAL MEDICINE
Payer: COMMERCIAL

## 2018-05-09 PROBLEM — J30.1 NON-SEASONAL ALLERGIC RHINITIS DUE TO POLLEN: Chronic | Status: ACTIVE | Noted: 2018-04-27

## 2018-05-09 PROBLEM — R09.02 HYPOXIA: Status: ACTIVE | Noted: 2018-05-09

## 2018-05-09 PROBLEM — I10 ESSENTIAL HYPERTENSION: Chronic | Status: ACTIVE | Noted: 2018-04-27

## 2018-05-09 LAB
ALBUMIN SERPL-MCNC: 1.9 G/DL (ref 3.5–5)
ALBUMIN/GLOB SERPL: 0.3 {RATIO} (ref 1.2–3.5)
ALP SERPL-CCNC: 50 U/L (ref 50–136)
ALT SERPL-CCNC: 10 U/L (ref 12–65)
ANION GAP SERPL CALC-SCNC: 6 MMOL/L (ref 7–16)
APPEARANCE UR: ABNORMAL
APTT PPP: 56.3 SEC (ref 23.2–35.3)
APTT PPP: 68.1 SEC (ref 23.2–35.3)
APTT PPP: 83.9 SEC (ref 23.2–35.3)
APTT PPP: 91.9 SEC (ref 23.2–35.3)
AST SERPL-CCNC: 53 U/L (ref 15–37)
BACTERIA URNS QL MICRO: ABNORMAL /HPF
BILIRUB SERPL-MCNC: 0.2 MG/DL (ref 0.2–1.1)
BILIRUB UR QL: ABNORMAL
BNP SERPL-MCNC: 7 PG/ML
BUN SERPL-MCNC: 19 MG/DL (ref 6–23)
CALCIUM SERPL-MCNC: 9.4 MG/DL (ref 8.3–10.4)
CHLORIDE SERPL-SCNC: 108 MMOL/L (ref 98–107)
CK MB CFR SERPL CALC: ABNORMAL %
CK MB SERPL-MCNC: <0.5 NG/ML (ref 0.5–3.6)
CK SERPL-CCNC: 94 U/L (ref 21–215)
CO2 SERPL-SCNC: 29 MMOL/L (ref 21–32)
COLOR UR: YELLOW
CREAT SERPL-MCNC: 1.03 MG/DL (ref 0.8–1.5)
EPI CELLS #/AREA URNS HPF: ABNORMAL /HPF
ERYTHROCYTE [DISTWIDTH] IN BLOOD BY AUTOMATED COUNT: 13.1 % (ref 11.9–14.6)
GLOBULIN SER CALC-MCNC: 5.9 G/DL (ref 2.3–3.5)
GLUCOSE SERPL-MCNC: 97 MG/DL (ref 65–100)
GLUCOSE UR STRIP.AUTO-MCNC: NEGATIVE MG/DL
HCT VFR BLD AUTO: 34 % (ref 41.1–50.3)
HGB BLD-MCNC: 11.3 G/DL (ref 13.6–17.2)
HGB UR QL STRIP: NEGATIVE
KETONES UR QL STRIP.AUTO: ABNORMAL MG/DL
LEUKOCYTE ESTERASE UR QL STRIP.AUTO: NEGATIVE
MAGNESIUM SERPL-MCNC: 1.9 MG/DL (ref 1.8–2.4)
MCH RBC QN AUTO: 31.5 PG (ref 26.1–32.9)
MCHC RBC AUTO-ENTMCNC: 33.2 G/DL (ref 31.4–35)
MCV RBC AUTO: 94.7 FL (ref 79.6–97.8)
NITRITE UR QL STRIP.AUTO: NEGATIVE
OTHER OBSERVATIONS,UCOM: ABNORMAL
PH UR STRIP: 6 [PH] (ref 5–9)
PLATELET # BLD AUTO: 250 K/UL (ref 150–450)
PMV BLD AUTO: 10.5 FL (ref 10.8–14.1)
POTASSIUM SERPL-SCNC: 4.4 MMOL/L (ref 3.5–5.1)
PROT SERPL-MCNC: 7.8 G/DL (ref 6.3–8.2)
PROT UR STRIP-MCNC: 100 MG/DL
RBC # BLD AUTO: 3.59 M/UL (ref 4.23–5.67)
RBC #/AREA URNS HPF: ABNORMAL /HPF
SODIUM SERPL-SCNC: 143 MMOL/L (ref 136–145)
SP GR UR REFRACTOMETRY: 1.04 (ref 1–1.02)
TROPONIN I SERPL-MCNC: <0.02 NG/ML (ref 0.02–0.05)
UROBILINOGEN UR QL STRIP.AUTO: 1 EU/DL (ref 0.2–1)
WBC # BLD AUTO: 10.1 K/UL (ref 4.3–11.1)
WBC URNS QL MICRO: ABNORMAL /HPF
YEAST URNS QL MICRO: ABNORMAL

## 2018-05-09 PROCEDURE — 77010033678 HC OXYGEN DAILY

## 2018-05-09 PROCEDURE — 80053 COMPREHEN METABOLIC PANEL: CPT | Performed by: INTERNAL MEDICINE

## 2018-05-09 PROCEDURE — 84484 ASSAY OF TROPONIN QUANT: CPT | Performed by: INTERNAL MEDICINE

## 2018-05-09 PROCEDURE — 83880 ASSAY OF NATRIURETIC PEPTIDE: CPT | Performed by: INTERNAL MEDICINE

## 2018-05-09 PROCEDURE — 71045 X-RAY EXAM CHEST 1 VIEW: CPT

## 2018-05-09 PROCEDURE — 87491 CHLMYD TRACH DNA AMP PROBE: CPT | Performed by: INTERNAL MEDICINE

## 2018-05-09 PROCEDURE — 74011000258 HC RX REV CODE- 258: Performed by: INTERNAL MEDICINE

## 2018-05-09 PROCEDURE — 77030019605

## 2018-05-09 PROCEDURE — 74011000250 HC RX REV CODE- 250: Performed by: INTERNAL MEDICINE

## 2018-05-09 PROCEDURE — 81001 URINALYSIS AUTO W/SCOPE: CPT | Performed by: INTERNAL MEDICINE

## 2018-05-09 PROCEDURE — 99253 IP/OBS CNSLTJ NEW/EST LOW 45: CPT | Performed by: INTERNAL MEDICINE

## 2018-05-09 PROCEDURE — 87070 CULTURE OTHR SPECIMN AEROBIC: CPT | Performed by: INTERNAL MEDICINE

## 2018-05-09 PROCEDURE — 74011250636 HC RX REV CODE- 250/636: Performed by: INTERNAL MEDICINE

## 2018-05-09 PROCEDURE — 65660000000 HC RM CCU STEPDOWN

## 2018-05-09 PROCEDURE — 85730 THROMBOPLASTIN TIME PARTIAL: CPT | Performed by: INTERNAL MEDICINE

## 2018-05-09 PROCEDURE — 74011250637 HC RX REV CODE- 250/637: Performed by: INTERNAL MEDICINE

## 2018-05-09 PROCEDURE — 85027 COMPLETE CBC AUTOMATED: CPT | Performed by: INTERNAL MEDICINE

## 2018-05-09 PROCEDURE — 83735 ASSAY OF MAGNESIUM: CPT | Performed by: INTERNAL MEDICINE

## 2018-05-09 PROCEDURE — 36415 COLL VENOUS BLD VENIPUNCTURE: CPT | Performed by: INTERNAL MEDICINE

## 2018-05-09 PROCEDURE — 82550 ASSAY OF CK (CPK): CPT | Performed by: INTERNAL MEDICINE

## 2018-05-09 PROCEDURE — 94760 N-INVAS EAR/PLS OXIMETRY 1: CPT

## 2018-05-09 PROCEDURE — 93306 TTE W/DOPPLER COMPLETE: CPT

## 2018-05-09 PROCEDURE — 94640 AIRWAY INHALATION TREATMENT: CPT

## 2018-05-09 RX ORDER — HEPARIN SODIUM 5000 [USP'U]/ML
35 INJECTION, SOLUTION INTRAVENOUS; SUBCUTANEOUS ONCE
Status: COMPLETED | OUTPATIENT
Start: 2018-05-09 | End: 2018-05-09

## 2018-05-09 RX ORDER — METOPROLOL TARTRATE 25 MG/1
25 TABLET, FILM COATED ORAL EVERY 6 HOURS
Status: DISCONTINUED | OUTPATIENT
Start: 2018-05-09 | End: 2018-05-13

## 2018-05-09 RX ORDER — HEPARIN SODIUM 1000 [USP'U]/ML
35 INJECTION, SOLUTION INTRAVENOUS; SUBCUTANEOUS ONCE
Status: DISCONTINUED | OUTPATIENT
Start: 2018-05-09 | End: 2018-05-09

## 2018-05-09 RX ORDER — HEPARIN SODIUM 5000 [USP'U]/ML
35 INJECTION, SOLUTION INTRAVENOUS; SUBCUTANEOUS ONCE
Status: DISCONTINUED | OUTPATIENT
Start: 2018-05-09 | End: 2018-05-09

## 2018-05-09 RX ADMIN — HEPARIN SODIUM 2350 UNITS: 5000 INJECTION, SOLUTION INTRAVENOUS; SUBCUTANEOUS at 15:45

## 2018-05-09 RX ADMIN — HEPARIN SODIUM AND DEXTROSE 16 UNITS/KG/HR: 5000; 5 INJECTION INTRAVENOUS at 15:46

## 2018-05-09 RX ADMIN — METOPROLOL TARTRATE 25 MG: 25 TABLET ORAL at 21:08

## 2018-05-09 RX ADMIN — FAMOTIDINE 20 MG: 10 INJECTION, SOLUTION INTRAVENOUS at 21:08

## 2018-05-09 RX ADMIN — SODIUM CHLORIDE 15 MG/HR: 900 INJECTION, SOLUTION INTRAVENOUS at 04:49

## 2018-05-09 RX ADMIN — HYDROCODONE BITARTRATE AND HOMATROPINE METHYLBROMIDE 5 ML: 5; 1.5 SOLUTION ORAL at 05:04

## 2018-05-09 RX ADMIN — TRAMADOL HYDROCHLORIDE 50 MG: 50 TABLET, FILM COATED ORAL at 11:41

## 2018-05-09 RX ADMIN — SODIUM CHLORIDE 15 MG/HR: 900 INJECTION, SOLUTION INTRAVENOUS at 23:44

## 2018-05-09 RX ADMIN — GUAIFENESIN 600 MG: 600 TABLET, EXTENDED RELEASE ORAL at 18:00

## 2018-05-09 RX ADMIN — BUDESONIDE 500 MCG: 0.5 INHALANT RESPIRATORY (INHALATION) at 20:42

## 2018-05-09 RX ADMIN — LEVALBUTEROL HYDROCHLORIDE 0.63 MG: 0.63 SOLUTION RESPIRATORY (INHALATION) at 14:43

## 2018-05-09 RX ADMIN — ACETAMINOPHEN 650 MG: 325 TABLET ORAL at 10:32

## 2018-05-09 RX ADMIN — BUDESONIDE 500 MCG: 0.5 INHALANT RESPIRATORY (INHALATION) at 08:40

## 2018-05-09 RX ADMIN — LEVALBUTEROL HYDROCHLORIDE 0.63 MG: 0.63 SOLUTION RESPIRATORY (INHALATION) at 08:40

## 2018-05-09 RX ADMIN — GUAIFENESIN 600 MG: 600 TABLET, EXTENDED RELEASE ORAL at 11:41

## 2018-05-09 RX ADMIN — LEVALBUTEROL HYDROCHLORIDE 0.63 MG: 0.63 SOLUTION RESPIRATORY (INHALATION) at 20:42

## 2018-05-09 RX ADMIN — HYDROCODONE BITARTRATE AND HOMATROPINE METHYLBROMIDE 5 ML: 5; 1.5 SOLUTION ORAL at 14:49

## 2018-05-09 RX ADMIN — METOPROLOL TARTRATE 25 MG: 25 TABLET ORAL at 10:26

## 2018-05-09 RX ADMIN — ACETAMINOPHEN 650 MG: 325 TABLET ORAL at 18:30

## 2018-05-09 RX ADMIN — HEPARIN SODIUM AND DEXTROSE 16 UNITS/KG/HR: 5000; 5 INJECTION INTRAVENOUS at 19:30

## 2018-05-09 RX ADMIN — AZITHROMYCIN MONOHYDRATE 500 MG: 500 INJECTION, POWDER, LYOPHILIZED, FOR SOLUTION INTRAVENOUS at 18:30

## 2018-05-09 RX ADMIN — SODIUM CHLORIDE 15 MG/HR: 900 INJECTION, SOLUTION INTRAVENOUS at 18:29

## 2018-05-09 RX ADMIN — TRAMADOL HYDROCHLORIDE 50 MG: 50 TABLET, FILM COATED ORAL at 20:13

## 2018-05-09 RX ADMIN — LEVALBUTEROL HYDROCHLORIDE 0.63 MG: 0.63 SOLUTION RESPIRATORY (INHALATION) at 02:00

## 2018-05-09 RX ADMIN — METOPROLOL TARTRATE 25 MG: 25 TABLET ORAL at 15:44

## 2018-05-09 RX ADMIN — FAMOTIDINE 20 MG: 10 INJECTION, SOLUTION INTRAVENOUS at 10:26

## 2018-05-09 RX ADMIN — CEFTRIAXONE SODIUM 2 G: 2 INJECTION, POWDER, FOR SOLUTION INTRAMUSCULAR; INTRAVENOUS at 15:44

## 2018-05-09 RX ADMIN — VENLAFAXINE HYDROCHLORIDE 37.5 MG: 37.5 CAPSULE, EXTENDED RELEASE ORAL at 11:41

## 2018-05-09 NOTE — PROGRESS NOTES
Dual Skin Assessment    Skin assessment completed with Casandra GALVEZ. No abnormalities noted. Allevyn applied to coccyx.           Lokesh Hoover    5/9/2018 1:18 PM

## 2018-05-09 NOTE — PROGRESS NOTES
made initial visit. Pt was alert and verbal.  Pt appeared comfortable with no pain level expressed or observed.  welcomed pt to DT and shared information about  services.  provided spiritual care through presence, pastoral conversation, and assurance of prayer.

## 2018-05-09 NOTE — PROGRESS NOTES
Bedside and Verbal shift change report given to self (oncoming nurse) by tracy emmanuel RN (offgoing nurse). Report included the following information SBAR, Kardex and Recent Results.

## 2018-05-09 NOTE — ROUTINE PROCESS
TRANSFER - OUT REPORT:    Verbal report given to Francisco Brandon RN(name) on [facility]  being transferred to tele(unit) for routine progression of care       Report consisted of patients Situation, Background, Assessment and   Recommendations(SBAR). Information from the following report(s) SBAR, ED Summary, [facility] and Recent Results was reviewed with the receiving nurse. Lines:   Peripheral IV 05/08/18 Right Antecubital (Active)   Site Assessment Clean, dry, & intact 5/8/2018  3:01 PM   Phlebitis Assessment 0 5/8/2018  3:01 PM   Infiltration Assessment 0 5/8/2018  3:01 PM   Dressing Status Clean, dry, & intact 5/8/2018  3:01 PM        Opportunity for questions and clarification was provided.       Patient transported with:   Monitor  O2 @ 3 liters  Registered Nurse

## 2018-05-09 NOTE — CONSULTS
[facility] Consultation        Date of  Admission: 5/8/2018  2:39 PM     Primary Care Physician: Dr. Jose Larkin  Primary Cardiologist: none  Referring Physician: Dr. Dionne Shafer Physician: Dr. She [place]    CC/Reason for consult: AF w/ RVR    HPI:  Enmanuel Delarosa is a 25-year-old AAM with h/o allergic rhinitis, HTN and asthma as a child but takes no daily medications. He came into the emergency room with a chief complaint of cough and severe shortness of breath. He has been having progressive shortness of breath, productive cough with yellow sputum and palpitations since April. He thought that it was an episode of asthma exacerbation secondary to the pollen in the environment. He was treated with a steroid taper and a Z-Tung. He continued having worsening shortness of breath and he saw Dr. Jose Larkin and had CXR on 05/02 which did not show any evidence of infiltrate. He has lost his appetite. When he wakes up in the morning his sputum is yellow, but during the day it clears up. He also reports some episodes of chills and night sweats. He presented to [facility] ER and heart rate was 163, respiratory rate of 20, O2 saturation of 96. He had an irregularly irregular rate and rhythm and ECG showed  bpm. Chest x-ray was done and showed patchy bilateral airspace opacities, which could represent pulmonary edema or pneumonia. He received IV ceftriaxone and Zithromax and was given 1 liter of normal saline. He was treated with 2 boluses of IV Cardizem and was started on a Cardizem drip and Heparin drip. He was admitted by the hospitalist and [facility] was consulted for AF management. Pt reports pleuritic chest pain worse when breaths, coughs and moves. He is positive for multiple drugs on UDS. Past Medical History:   Diagnosis Date    Asthma     Migraines       History reviewed. No pertinent surgical history.     No Known Allergies   Social History     Social History    Marital status: SINGLE Spouse name: N/A    Number of children: N/A    Years of education: N/A     Occupational History    Not on file. Social History Main Topics    Smoking status: Former Smoker     Packs/day: 1.00     Years: 5.00     Types: Cigarettes     Quit date: 02/2018    Smokeless tobacco: Never Used    Alcohol use Yes      Comment: occ.     Drug use: Yes     Special: Marijuana    Sexual activity: Yes     Partners: Male     Birth control/ protection: Condom     Other Topics Concern    Not on file     Social History Narrative     Family History   Problem Relation Age of Onset    Cancer Mother 52     breast     Hypertension Mother     Diabetes Father     Migraines Sister     Other Brother      chrones        Current Facility-Administered Medications   Medication Dose Route Frequency    guaiFENesin ER (MUCINEX) tablet 600 mg  600 mg Oral BID    HYDROcodone-homatropine (HYCODAN) 5-1.5 mg/5 mL (5 mL) syrup 5 mL  5 mL Oral Q6H PRN    venlafaxine-SR (EFFEXOR-XR) capsule 37.5 mg  37.5 mg Oral DAILY    budesonide (PULMICORT) 500 mcg/2 ml nebulizer suspension  500 mcg Nebulization BID RT    heparin 25,000 units in dextrose 500 mL infusion  12-25 Units/kg/hr IntraVENous TITRATE    dilTIAZem (CARDIZEM) 100 mg in 0.9% sodium chloride (MBP/ADV) 100 mL infusion  0-15 mg/hr IntraVENous TITRATE    acetaminophen (TYLENOL) tablet 650 mg  650 mg Oral Q6H PRN    traMADol (ULTRAM) tablet 50 mg  50 mg Oral Q6H PRN    famotidine (PF) (PEPCID) 20 mg in sodium chloride 0.9% 10 mL injection  20 mg IntraVENous Q12H    0.9% sodium chloride infusion  100 mL/hr IntraVENous CONTINUOUS    cefTRIAXone (ROCEPHIN) 2 g in 0.9% sodium chloride (MBP/ADV) 50 mL  2 g IntraVENous Q24H    azithromycin (ZITHROMAX) 500 mg in 0.9% sodium chloride (MBP/ADV) 250 mL  500 mg IntraVENous Q24H    levalbuterol (XOPENEX) nebulizer soln 0.63 mg/3 mL  0.63 mg Nebulization Q6H RT     Current Outpatient Prescriptions   Medication Sig    fluticasone-vilanterol (BREO ELLIPTA) 100-25 mcg/dose inhaler Take 1 Puff by inhalation daily.  HYDROcodone-homatropine (HYCODAN) 5-1.5 mg/5 mL syrup Take 5 mL by mouth four (4) times daily as needed. Max Daily Amount: 20 mL.  loratadine (CLARITIN) 10 mg tablet Take 10 mg by mouth.  guaiFENesin ER (MUCINEX) 600 mg ER tablet Take 600 mg by mouth two (2) times a day.  albuterol (PROVENTIL HFA, VENTOLIN HFA, PROAIR HFA) 90 mcg/actuation inhaler Take 2 Puffs by inhalation every four (4) hours as needed for Wheezing.  venlafaxine-SR (EFFEXOR-XR) 37.5 mg capsule Take 1 Cap by mouth daily.        Review of symptoms:  General: +recent weight loss, +weakness, fatigue, +fever or chills   Skin: no rashes, lumps, or other skin changes   HEENT: no headache, dizziness, lightheadedness, vision changes, hearing changes, tinnitus, vertigo, sinus pressure/pain, bleeding gums, sore throat, or hoarseness   Neck: no swollen glands, goiter, pain or stiffness   Respiratory: +cough, yellow sputum, no hemoptysis,+ dyspnea, +wheezing   Cardiovascular: + pleuritic chest pain/discomfort, +palpitations, +dyspnea, no orthopnea, paroxysmal nocturnal dyspnea, no peripheral edema   Gastrointestinal: no trouble swallowing, heartburn, change of appetite, +nausea, change in bowel habits, pain with defecation, rectal bleeding or black/tarry stools, hemorrhoids, constipation, diarrhea, abdominal pain, jaundice, liver or gallbladder problems   Urinary: no frequency, urgency , hematuria, burning/pain with urination, recent flank pain, polyuria, nocturia, or difficulty urinating   Peripheral Vascular: no claudication, leg cramps, prior DVTs, swelling of calves, legs, or feet, color change, or swelling with redness or tenderness   Musculoskeletal: no muscle or joint pain/stiffness, joint swelling, erythema of joints, or back pain   Psychiatric: no depression, mental disorders, or excessive stress   Neurological: no history of CVA, dizziness, no sensory or motor loss, seizures, syncope, tremors, numbness, tingling, no changes in mood, attention, or speech, no changes in orientation, memory, insight, or judgment. no headache, vertigo. Hematologic: no anemia, easy bruising or bleeding   Endocrine: no diabetes, thyroid problems, heat or cold intolerance, excessive sweating, polyuria, polydipsia        Subjective:   Physical Exam    Visit Vitals    /70    Pulse (!) 114    Temp 98.6 °F (37 °C)    Resp (!) 45    Ht 6' 2\" (1.88 m)    Wt 72.6 kg (160 lb)    SpO2 94%    BMI 20.54 kg/m2     General Appearance:  Well developed, well nourished,alert and oriented x 3, and individual in no acute distress. Ears/Nose/Mouth/Throat:   Hearing grossly normal.         Neck: Supple. Chest:   Lungs crackles bilaterally. Cardiovascular:  Irregular rate and rhythm-rapid, S1, S2   Abdomen:   Soft, non-tender, bowel sounds are active. Extremities: No edema bilaterally. Skin: Warm and dry. Cardiographics  Telemetry: AFIB  ECG: atrial fibrillation, rate 160  Echocardiogram: pending    Labs:   Recent Results (from the past 24 hour(s))   EKG, 12 LEAD, INITIAL    Collection Time: 05/08/18  2:36 PM   Result Value Ref Range    Ventricular Rate 160 BPM    Atrial Rate 105 BPM    QRS Duration 68 ms    Q-T Interval 252 ms    QTC Calculation (Bezet) 411 ms    Calculated R Axis 91 degrees    Calculated T Axis 0 degrees    Diagnosis       !! AGE AND GENDER SPECIFIC ECG ANALYSIS !!   Atrial fibrillation with rapid ventricular response  Rightward axis  Nonspecific T wave abnormality , probably digitalis effect  Abnormal ECG  No previous ECGs available  Confirmed by Kim Neri MD (), [facility] ([id number]) on 5/8/2018 4:54:28 PM     CBC WITH AUTOMATED DIFF    Collection Time: 05/08/18  2:45 PM   Result Value Ref Range    WBC 14.0 (H) 4.3 - 11.1 K/uL    RBC 4.01 (L) 4.23 - 5.67 M/uL    HGB 13.1 (L) 13.6 - 17.2 g/dL    HCT 37.7 (L) 41.1 - 50.3 %    MCV 94.0 79.6 - 97.8 FL    MCH 32.7 26.1 - 32.9 PG    MCHC 34.7 31.4 - 35.0 g/dL    RDW 13.1 11.9 - 14.6 %    PLATELET 089 446 - 451 K/uL    MPV 10.5 (L) 10.8 - 14.1 FL    DF AUTOMATED      NEUTROPHILS 88 (H) 43 - 78 %    LYMPHOCYTES 7 (L) 13 - 44 %    MONOCYTES 4 4.0 - 12.0 %    EOSINOPHILS 1 0.5 - 7.8 %    BASOPHILS 0 0.0 - 2.0 %    IMMATURE GRANULOCYTES 0 0.0 - 5.0 %    ABS. NEUTROPHILS 12.3 (H) 1.7 - 8.2 K/UL    ABS. LYMPHOCYTES 1.0 0.5 - 4.6 K/UL    ABS. MONOCYTES 0.5 0.1 - 1.3 K/UL    ABS. EOSINOPHILS 0.1 0.0 - 0.8 K/UL    ABS. BASOPHILS 0.0 0.0 - 0.2 K/UL    ABS. IMM. GRANS. 0.0 0.0 - 0.5 K/UL   METABOLIC PANEL, COMPREHENSIVE    Collection Time: 05/08/18  2:45 PM   Result Value Ref Range    Sodium 141 136 - 145 mmol/L    Potassium 4.2 3.5 - 5.1 mmol/L    Chloride 107 98 - 107 mmol/L    CO2 26 21 - 32 mmol/L    Anion gap 8 7 - 16 mmol/L    Glucose 92 65 - 100 mg/dL    BUN 27 (H) 6 - 23 MG/DL    Creatinine 1.20 0.8 - 1.5 MG/DL    GFR est AA >60 >60 ml/min/1.73m2    GFR est non-AA >60 >60 ml/min/1.73m2    Calcium 9.5 8.3 - 10.4 MG/DL    Bilirubin, total 0.3 0.2 - 1.1 MG/DL    ALT (SGPT) 12 12 - 65 U/L    AST (SGOT) 51 (H) 15 - 37 U/L    Alk.  phosphatase 61 50 - 136 U/L    Protein, total 8.5 (H) 6.3 - 8.2 g/dL    Albumin 2.1 (L) 3.5 - 5.0 g/dL    Globulin 6.4 (H) 2.3 - 3.5 g/dL    A-G Ratio 0.3 (L) 1.2 - 3.5     MAGNESIUM    Collection Time: 05/08/18  2:45 PM   Result Value Ref Range    Magnesium 2.0 1.8 - 2.4 mg/dL   PROTHROMBIN TIME + INR    Collection Time: 05/08/18  2:45 PM   Result Value Ref Range    Prothrombin time 16.1 (H) 11.5 - 14.5 sec    INR 1.3     PTT    Collection Time: 05/08/18  2:45 PM   Result Value Ref Range    aPTT 40.8 (H) 23.2 - 35.3 SEC   BNP    Collection Time: 05/08/18  2:45 PM   Result Value Ref Range    BNP 16 pg/mL   TSH 3RD GENERATION    Collection Time: 05/08/18  2:45 PM   Result Value Ref Range    TSH 0.639 0.358 - 3.740 uIU/mL   PROCALCITONIN    Collection Time: 05/08/18  2:55 PM   Result Value Ref Range    Procalcitonin 0.6 ng/mL   CULTURE, BLOOD    Collection Time: 05/08/18  3:47 PM   Result Value Ref Range    Special Requests: RIGHT  HAND        Culture result: NO GROWTH AFTER 14 HOURS     CULTURE, BLOOD    Collection Time: 05/08/18  3:47 PM   Result Value Ref Range    Special Requests: LEFT FOREARM      Culture result: NO GROWTH AFTER 14 HOURS     POC LACTIC ACID    Collection Time: 05/08/18  4:03 PM   Result Value Ref Range    Lactic Acid (POC) 1.0 0.5 - 1.9 mmol/L   INFLUENZA A & B AG (RAPID TEST)    Collection Time: 05/08/18  5:31 PM   Result Value Ref Range    Influenza A Ag NEGATIVE  NEG      Influenza B Ag NEGATIVE  NEG     DRUG SCREEN, URINE    Collection Time: 05/08/18  8:50 PM   Result Value Ref Range    PCP(PHENCYCLIDINE) POSITIVE      BENZODIAZEPINES NEGATIVE       COCAINE NEGATIVE       AMPHETAMINES NEGATIVE       METHADONE NEGATIVE       THC (TH-CANNABINOL) POSITIVE      OPIATES POSITIVE      BARBITURATES NEGATIVE      CBC W/O DIFF    Collection Time: 05/08/18  8:50 PM   Result Value Ref Range    WBC 9.3 4.3 - 11.1 K/uL    RBC 4.26 4.23 - 5.67 M/uL    HGB 13.6 13.6 - 17.2 g/dL    HCT 40.4 (L) 41.1 - 50.3 %    MCV 94.8 79.6 - 97.8 FL    MCH 31.9 26.1 - 32.9 PG    MCHC 33.7 31.4 - 35.0 g/dL    RDW 13.2 11.9 - 14.6 %    PLATELET 775 947 - 232 K/uL    MPV 10.6 (L) 10.8 - 24.0 FL   METABOLIC PANEL, COMPREHENSIVE    Collection Time: 05/08/18  8:50 PM   Result Value Ref Range    Sodium 144 136 - 145 mmol/L    Potassium 4.1 3.5 - 5.1 mmol/L    Chloride 110 (H) 98 - 107 mmol/L    CO2 25 21 - 32 mmol/L    Anion gap 9 7 - 16 mmol/L    Glucose 108 (H) 65 - 100 mg/dL    BUN 25 (H) 6 - 23 MG/DL    Creatinine 1.04 0.8 - 1.5 MG/DL    GFR est AA >60 >60 ml/min/1.73m2    GFR est non-AA >60 >60 ml/min/1.73m2    Calcium 9.4 8.3 - 10.4 MG/DL    Bilirubin, total 0.4 0.2 - 1.1 MG/DL    ALT (SGPT) 11 (L) 12 - 65 U/L    AST (SGOT) 51 (H) 15 - 37 U/L    Alk.  phosphatase 56 50 - 136 U/L    Protein, total 8.3 (H) 6.3 - 8.2 g/dL    Albumin 2.0 (L) 3.5 - 5.0 g/dL    Globulin 6.3 (H) 2.3 - 3.5 g/dL    A-G Ratio 0.3 (L) 1.2 - 3.5     MAGNESIUM    Collection Time: 05/08/18  8:50 PM   Result Value Ref Range    Magnesium 2.1 1.8 - 2.4 mg/dL   PTT    Collection Time: 05/09/18  1:11 AM   Result Value Ref Range    aPTT 56.3 (H) 23.2 - 35.3 SEC   BNP    Collection Time: 05/09/18  4:53 AM   Result Value Ref Range    BNP 7 pg/mL   CBC W/O DIFF    Collection Time: 05/09/18  4:53 AM   Result Value Ref Range    WBC 10.1 4.3 - 11.1 K/uL    RBC 3.59 (L) 4.23 - 5.67 M/uL    HGB 11.3 (L) 13.6 - 17.2 g/dL    HCT 34.0 (L) 41.1 - 50.3 %    MCV 94.7 79.6 - 97.8 FL    MCH 31.5 26.1 - 32.9 PG    MCHC 33.2 31.4 - 35.0 g/dL    RDW 13.1 11.9 - 14.6 %    PLATELET 309 535 - 272 K/uL    MPV 10.5 (L) 10.8 - 84.9 FL   METABOLIC PANEL, COMPREHENSIVE    Collection Time: 05/09/18  4:53 AM   Result Value Ref Range    Sodium 143 136 - 145 mmol/L    Potassium 4.4 3.5 - 5.1 mmol/L    Chloride 108 (H) 98 - 107 mmol/L    CO2 29 21 - 32 mmol/L    Anion gap 6 (L) 7 - 16 mmol/L    Glucose 97 65 - 100 mg/dL    BUN 19 6 - 23 MG/DL    Creatinine 1.03 0.8 - 1.5 MG/DL    GFR est AA >60 >60 ml/min/1.73m2    GFR est non-AA >60 >60 ml/min/1.73m2    Calcium 9.4 8.3 - 10.4 MG/DL    Bilirubin, total 0.2 0.2 - 1.1 MG/DL    ALT (SGPT) 10 (L) 12 - 65 U/L    AST (SGOT) 53 (H) 15 - 37 U/L    Alk.  phosphatase 50 50 - 136 U/L    Protein, total 7.8 6.3 - 8.2 g/dL    Albumin 1.9 (L) 3.5 - 5.0 g/dL    Globulin 5.9 (H) 2.3 - 3.5 g/dL    A-G Ratio 0.3 (L) 1.2 - 3.5     MAGNESIUM    Collection Time: 05/09/18  4:53 AM   Result Value Ref Range    Magnesium 1.9 1.8 - 2.4 mg/dL   TROPONIN I    Collection Time: 05/09/18  4:53 AM   Result Value Ref Range    Troponin-I, Qt. <0.02 (L) 0.02 - 0.05 NG/ML   CK WITH MB    Collection Time: 05/09/18  4:53 AM   Result Value Ref Range    CK 94 21 - 215 U/L    CK - MB <0.5 (L) 0.5 - 3.6 ng/ml    CK-MB Index Cannot be calculated <2.5 %   PTT    Collection Time: 05/09/18  8:05 AM   Result Value Ref Range    aPTT 83.9 (H) 23.2 - 35.3 SEC       Pt has been seen and examined by Dr. Mio Jo. He agrees with the following assessment and plan. Assessment/Plan:          Diagnosis    Pneumonia- per primary team- IV antibiotics    New onset a-fib- agree with IV Cardizem, add PO BB, continue heparin, echo pending    Essential hypertension- controlled, monitor    Non-seasonal allergic rhinitis due to pollen    Asthma    Migraines       Thank you for consulting [address] [place] [address] [address] [facility] and allowing us to participate in the care of this patient. We will continue to follow along with you.     Casandra Oh PA-C

## 2018-05-09 NOTE — CDMP QUERY
Please clarify if this patient is being treated/managed for:    SEPSIS Present on admission in the setting of pneumonia with leukocytosis, tachycardia, and fever requiring treatment with IVF, Tylenol, Rocephin, and Zithromax     =>Other Explanation of clinical findings  =>Unable to Determine (no explanation of clinical findings)    The medical record reflects the following:    Risk Factors: Pneumonia    Clinical Indicators: WBC 14.0/88% neutrophils, Tachycardia (rate as high as 163), T-max 102.9     Treatment: 1L NS Bolus, then NS @ 100 mL/hr, Tylenol, Rocephin, Zithromax     Please clarify and document your clinical opinion in the progress notes and discharge summary including the definitive and/or presumptive diagnosis, (suspected or probable), related to the above clinical findings. Please include clinical findings supporting your diagnosis.     Thanks,  Guillermo Zhou RN, [address] [place] [address] Documentation Management Program  [phone]

## 2018-05-09 NOTE — PROGRESS NOTES
Progress Note    Patient: Tomás Ludwig MRN: [id number]  SSN: [id number]    YOB: 1990  Age: 28 y.o. Sex: male      Admit Date: 5/8/2018    LOS: 1 day     Subjective: This is a 29 YO male patient admitted yesterday with a-flutter and RVR and bilateral lung infiltrates. Started on a cardizem and heparin drip. He was also started on IV antibiotics. Chest ct scan reported possible infection vs edema vs hemorrhage. BNP was normal and clinically he does not have fluid overload. Drug screen positive for THC, PCP and opiates. He might have smoked a composite of marijuana + PCP dust mixed with other substance ( patient denies it). Will ask pulmonary and cards to see him. HIV test pending. Objective:     Vitals:    05/09/18 0201 05/09/18 0301 05/09/18 0401 05/09/18 0501   BP: 107/63 113/57 106/62 113/70   Pulse: (!) 109 (!) 106 (!) 103 (!) 114   Resp: (!) 37 24 11 (!) 45   Temp:       SpO2: 95% (!) 89% 93% 94%   Weight:       Height:            Intake and Output:  Current Shift:    Last three shifts:      Physical Exam:   GENERAL: alert, cooperative, mild distress  EYE: conjunctivae/corneas clear. PERRL, EOM's intact. Fundi benign  LYMPHATIC: Cervical, supraclavicular, and axillary nodes normal.   THROAT & NECK: normal and no erythema or exudates noted. LUNG: clear to auscultation bilaterally  HEART: irregularly irregular rate and rhythm   ABDOMEN: soft, non-tender. Bowel sounds normal. No masses,  no organomegaly  EXTREMITIES:  extremities normal, atraumatic, no cyanosis or edema  SKIN: Normal.  NEUROLOGIC: no focal deficits   PSYCHIATRIC: non focal    Lab/Data Review:  Lab results reviewed. For significant abnormal values and values requiring intervention, see assessment and plan.          Assessment:     Active Problems:    Pneumonia (5/8/2018)      New onset a-fib ([address] (5/8/2018)        Plan:     #  Possible bilateral pneumonia vs pulmonary hemorrhage   -continue IV antibiotics and breathing therapies with xopenex   -clinically he is not fluid overloaded and I do not suspect pulmonary edema   -drug screen pos. For [facility] and PCP. He might have smoked a composite of substances causing lung damage ( he denies )   -will check trops, and CK ( rhabdo?) , urinalysis ordered   -HIV test in progress   -pulmonary consulted     2. Atrial fibrillation/ atrial flutter  with rapid ventricular response.    -continue cardizem drip   -continue heparin drip   -ECHO pending   -cardiology consulted     3. Deep venous thrombosis prophylaxis.   Heparin drip     Signed By: Mayela Salazar MD     May 9, 2018

## 2018-05-09 NOTE — PROGRESS NOTES
TRANSFER - IN REPORT:    Verbal report received from [facility] RN(name) on Alden  being received from ER(unit) for routine progression of care      Report consisted of patients Situation, Background, Assessment and   Recommendations(SBAR). Information from the following report(s) SBAR, Kardex, ED Summary, Intake/Output, MAR, Recent Results and Cardiac Rhythm afib was reviewed with the receiving nurse. Opportunity for questions and clarification was provided. Assessment completed upon patients arrival to unit and care assumed.

## 2018-05-10 ENCOUNTER — APPOINTMENT (OUTPATIENT)
Dept: GENERAL RADIOLOGY | Age: 28
DRG: 974 | End: 2018-05-10
Attending: INTERNAL MEDICINE
Payer: COMMERCIAL

## 2018-05-10 PROBLEM — J96.91 RESPIRATORY FAILURE WITH HYPOXIA (HCC): Status: ACTIVE | Noted: 2018-05-10

## 2018-05-10 PROBLEM — Z21 HIV POSITIVE (HCC): Status: ACTIVE | Noted: 2018-05-10

## 2018-05-10 PROBLEM — B59 PCP (PNEUMOCYSTIS CARINII PNEUMONIA) (HCC): Status: ACTIVE | Noted: 2018-05-10

## 2018-05-10 PROBLEM — A41.9 SEPSIS (HCC): Status: ACTIVE | Noted: 2018-05-10

## 2018-05-10 LAB
ANION GAP SERPL CALC-SCNC: 9 MMOL/L (ref 7–16)
APTT PPP: 110.5 SEC (ref 23.2–35.3)
APTT PPP: 111.9 SEC (ref 23.2–35.3)
ARTERIAL PATENCY WRIST A: ABNORMAL
ARTERIAL PATENCY WRIST A: POSITIVE
BASE DEFICIT BLDA-SCNC: 0.8 MMOL/L (ref 0–2)
BASE EXCESS BLDA CALC-SCNC: 2.3 MMOL/L (ref 0–3)
BDY SITE: ABNORMAL
BDY SITE: ABNORMAL
BUN SERPL-MCNC: 16 MG/DL (ref 6–23)
CA-I BLD-SCNC: 1.28 MMOL/L (ref 1–1.3)
CALCIUM SERPL-MCNC: 9.6 MG/DL (ref 8.3–10.4)
CHLORIDE BLDA-SCNC: 105 MMOL/L (ref 98–106)
CHLORIDE SERPL-SCNC: 106 MMOL/L (ref 98–107)
CO2 SERPL-SCNC: 26 MMOL/L (ref 21–32)
COHGB MFR BLD: 0.4 % (ref 0.5–1.5)
COHGB MFR BLD: 0.7 % (ref 0.5–1.5)
CREAT SERPL-MCNC: 0.93 MG/DL (ref 0.8–1.5)
DO-HGB BLD-MCNC: 12 % (ref 0–5)
DO-HGB BLD-MCNC: 7 % (ref 0–5)
ERYTHROCYTE [DISTWIDTH] IN BLOOD BY AUTOMATED COUNT: 13.1 % (ref 11.9–14.6)
FIO2 ON VENT: 100 %
GAS FLOW.O2 O2 DELIVERY SYS: 5 L/MIN
GLUCOSE SERPL-MCNC: 87 MG/DL (ref 65–100)
HBV SURFACE AB SERPL IA-ACNC: 120.19 MIU/ML
HCO3 BLDA-SCNC: 25 MMOL/L (ref 22–26)
HCO3 BLDA-SCNC: 27 MMOL/L (ref 22–26)
HCT VFR BLD AUTO: 33.7 % (ref 41.1–50.3)
HGB BLD-MCNC: 10.7 G/DL (ref 13.6–17.2)
HGB BLDMV-MCNC: 11.4 GM/DL (ref 11.7–15)
HGB BLDMV-MCNC: 11.5 GM/DL (ref 11.7–15)
HIV1 P24 AG SERPL QL IA: NONREACTIVE
HIV1+2 AB SERPL QL IA: REACTIVE
LDH SERPL L TO P-CCNC: 618 U/L (ref 100–190)
MAGNESIUM SERPL-MCNC: 1.8 MG/DL (ref 1.8–2.4)
MCH RBC QN AUTO: 30.4 PG (ref 26.1–32.9)
MCHC RBC AUTO-ENTMCNC: 31.8 G/DL (ref 31.4–35)
MCV RBC AUTO: 95.7 FL (ref 79.6–97.8)
METHGB MFR BLD: 0 % (ref 0–1.5)
METHGB MFR BLD: 0 % (ref 0–1.5)
OXYHGB MFR BLDA: 87.1 % (ref 94–97)
OXYHGB MFR BLDA: 92.3 % (ref 94–97)
PCO2 BLDA: 42 MMHG (ref 35–45)
PCO2 BLDA: 43 MMHG (ref 35–45)
PH BLDA: 7.37 [PH] (ref 7.35–7.45)
PH BLDA: 7.43 [PH] (ref 7.35–7.45)
PLATELET # BLD AUTO: 299 K/UL (ref 150–450)
PMV BLD AUTO: 11.4 FL (ref 10.8–14.1)
PO2 BLDA: 54 MMHG (ref 80–105)
PO2 BLDA: 69 MMHG (ref 80–105)
POTASSIUM BLDA-SCNC: 4.09 MMOL/L (ref 3.5–5.3)
POTASSIUM SERPL-SCNC: 4.5 MMOL/L (ref 3.5–5.1)
RBC # BLD AUTO: 3.52 M/UL (ref 4.23–5.67)
SAO2 % BLD: 88 % (ref 92–98.5)
SAO2 % BLD: 93 % (ref 92–98.5)
SERVICE CMNT-IMP: ABNORMAL
SODIUM BLDA-SCNC: 136.8 MMOL/L (ref 135–148)
SODIUM SERPL-SCNC: 141 MMOL/L (ref 136–145)
VENTILATION MODE VENT: ABNORMAL
VENTILATION MODE VENT: ABNORMAL
WBC # BLD AUTO: 12 K/UL (ref 4.3–11.1)

## 2018-05-10 PROCEDURE — 86803 HEPATITIS C AB TEST: CPT | Performed by: INTERNAL MEDICINE

## 2018-05-10 PROCEDURE — 74011250637 HC RX REV CODE- 250/637: Performed by: INTERNAL MEDICINE

## 2018-05-10 PROCEDURE — 77010033678 HC OXYGEN DAILY

## 2018-05-10 PROCEDURE — 36415 COLL VENOUS BLD VENIPUNCTURE: CPT | Performed by: INTERNAL MEDICINE

## 2018-05-10 PROCEDURE — 74011000258 HC RX REV CODE- 258: Performed by: INTERNAL MEDICINE

## 2018-05-10 PROCEDURE — 74011250636 HC RX REV CODE- 250/636: Performed by: INTERNAL MEDICINE

## 2018-05-10 PROCEDURE — 94760 N-INVAS EAR/PLS OXIMETRY 1: CPT

## 2018-05-10 PROCEDURE — 87070 CULTURE OTHR SPECIMN AEROBIC: CPT | Performed by: NURSE PRACTITIONER

## 2018-05-10 PROCEDURE — 36600 WITHDRAWAL OF ARTERIAL BLOOD: CPT

## 2018-05-10 PROCEDURE — 80048 BASIC METABOLIC PNL TOTAL CA: CPT | Performed by: INTERNAL MEDICINE

## 2018-05-10 PROCEDURE — 83735 ASSAY OF MAGNESIUM: CPT | Performed by: INTERNAL MEDICINE

## 2018-05-10 PROCEDURE — 74011000250 HC RX REV CODE- 250: Performed by: INTERNAL MEDICINE

## 2018-05-10 PROCEDURE — 80051 ELECTROLYTE PANEL: CPT

## 2018-05-10 PROCEDURE — 99232 SBSQ HOSP IP/OBS MODERATE 35: CPT | Performed by: INTERNAL MEDICINE

## 2018-05-10 PROCEDURE — 85730 THROMBOPLASTIN TIME PARTIAL: CPT | Performed by: INTERNAL MEDICINE

## 2018-05-10 PROCEDURE — 71045 X-RAY EXAM CHEST 1 VIEW: CPT

## 2018-05-10 PROCEDURE — 74011636637 HC RX REV CODE- 636/637: Performed by: INTERNAL MEDICINE

## 2018-05-10 PROCEDURE — 87389 HIV-1 AG W/HIV-1&-2 AB AG IA: CPT | Performed by: INTERNAL MEDICINE

## 2018-05-10 PROCEDURE — 86361 T CELL ABSOLUTE COUNT: CPT | Performed by: INTERNAL MEDICINE

## 2018-05-10 PROCEDURE — 87536 HIV-1 QUANT&REVRSE TRNSCRPJ: CPT | Performed by: INTERNAL MEDICINE

## 2018-05-10 PROCEDURE — 77010033711 HC HIGH FLOW OXYGEN

## 2018-05-10 PROCEDURE — 94640 AIRWAY INHALATION TREATMENT: CPT

## 2018-05-10 PROCEDURE — 85027 COMPLETE CBC AUTOMATED: CPT | Performed by: INTERNAL MEDICINE

## 2018-05-10 PROCEDURE — 65270000029 HC RM PRIVATE

## 2018-05-10 PROCEDURE — 87449 NOS EACH ORGANISM AG IA: CPT | Performed by: INTERNAL MEDICINE

## 2018-05-10 PROCEDURE — 86592 SYPHILIS TEST NON-TREP QUAL: CPT | Performed by: INTERNAL MEDICINE

## 2018-05-10 PROCEDURE — 74011250637 HC RX REV CODE- 250/637: Performed by: NURSE PRACTITIONER

## 2018-05-10 PROCEDURE — 86706 HEP B SURFACE ANTIBODY: CPT | Performed by: INTERNAL MEDICINE

## 2018-05-10 PROCEDURE — 87901 NFCT AGT GNTYP ALYS HIV1 REV: CPT | Performed by: INTERNAL MEDICINE

## 2018-05-10 PROCEDURE — 77030020120 HC VLV RESP PEP HI -B

## 2018-05-10 PROCEDURE — 83615 LACTATE (LD) (LDH) ENZYME: CPT | Performed by: INTERNAL MEDICINE

## 2018-05-10 RX ORDER — PREDNISONE 20 MG/1
40 TABLET ORAL 2 TIMES DAILY WITH MEALS
Status: COMPLETED | OUTPATIENT
Start: 2018-05-10 | End: 2018-05-14

## 2018-05-10 RX ORDER — PREDNISONE 20 MG/1
40 TABLET ORAL 2 TIMES DAILY WITH MEALS
Status: DISCONTINUED | OUTPATIENT
Start: 2018-05-10 | End: 2018-05-10 | Stop reason: SDUPTHER

## 2018-05-10 RX ORDER — NYSTATIN 100000 [USP'U]/ML
500000 SUSPENSION ORAL 4 TIMES DAILY
Status: DISCONTINUED | OUTPATIENT
Start: 2018-05-10 | End: 2018-05-15 | Stop reason: HOSPADM

## 2018-05-10 RX ORDER — PREDNISONE 20 MG/1
40 TABLET ORAL
Status: DISCONTINUED | OUTPATIENT
Start: 2018-05-15 | End: 2018-05-15 | Stop reason: HOSPADM

## 2018-05-10 RX ORDER — PREDNISONE 20 MG/1
20 TABLET ORAL
Status: DISCONTINUED | OUTPATIENT
Start: 2018-05-20 | End: 2018-05-15 | Stop reason: HOSPADM

## 2018-05-10 RX ORDER — DILTIAZEM HYDROCHLORIDE 180 MG/1
180 CAPSULE, COATED, EXTENDED RELEASE ORAL 2 TIMES DAILY
Status: DISCONTINUED | OUTPATIENT
Start: 2018-05-10 | End: 2018-05-15 | Stop reason: HOSPADM

## 2018-05-10 RX ORDER — FAMOTIDINE 20 MG/1
20 TABLET, FILM COATED ORAL 2 TIMES DAILY
Status: DISCONTINUED | OUTPATIENT
Start: 2018-05-10 | End: 2018-05-15 | Stop reason: HOSPADM

## 2018-05-10 RX ADMIN — DILTIAZEM HYDROCHLORIDE 180 MG: 180 CAPSULE, COATED, EXTENDED RELEASE ORAL at 17:14

## 2018-05-10 RX ADMIN — BUDESONIDE 500 MCG: 0.5 INHALANT RESPIRATORY (INHALATION) at 08:54

## 2018-05-10 RX ADMIN — METOPROLOL TARTRATE 25 MG: 25 TABLET ORAL at 02:45

## 2018-05-10 RX ADMIN — LEVALBUTEROL HYDROCHLORIDE 0.63 MG: 0.63 SOLUTION RESPIRATORY (INHALATION) at 14:17

## 2018-05-10 RX ADMIN — LEVALBUTEROL HYDROCHLORIDE 0.63 MG: 0.63 SOLUTION RESPIRATORY (INHALATION) at 19:48

## 2018-05-10 RX ADMIN — SODIUM CHLORIDE 15 MG/HR: 900 INJECTION, SOLUTION INTRAVENOUS at 13:58

## 2018-05-10 RX ADMIN — HYDROCODONE BITARTRATE AND HOMATROPINE METHYLBROMIDE 5 ML: 5; 1.5 SOLUTION ORAL at 11:40

## 2018-05-10 RX ADMIN — ACETAMINOPHEN 650 MG: 325 TABLET ORAL at 05:57

## 2018-05-10 RX ADMIN — SODIUM CHLORIDE 100 ML/HR: 900 INJECTION, SOLUTION INTRAVENOUS at 08:34

## 2018-05-10 RX ADMIN — ACETAMINOPHEN 650 MG: 325 TABLET ORAL at 01:03

## 2018-05-10 RX ADMIN — BUDESONIDE 500 MCG: 0.5 INHALANT RESPIRATORY (INHALATION) at 19:48

## 2018-05-10 RX ADMIN — AZITHROMYCIN MONOHYDRATE 500 MG: 500 INJECTION, POWDER, LYOPHILIZED, FOR SOLUTION INTRAVENOUS at 17:57

## 2018-05-10 RX ADMIN — GUAIFENESIN 600 MG: 600 TABLET, EXTENDED RELEASE ORAL at 17:14

## 2018-05-10 RX ADMIN — LEVALBUTEROL HYDROCHLORIDE 0.63 MG: 0.63 SOLUTION RESPIRATORY (INHALATION) at 01:16

## 2018-05-10 RX ADMIN — NYSTATIN 500000 UNITS: 100000 SUSPENSION ORAL at 17:15

## 2018-05-10 RX ADMIN — SODIUM CHLORIDE 100 ML/HR: 900 INJECTION, SOLUTION INTRAVENOUS at 21:27

## 2018-05-10 RX ADMIN — CEFTRIAXONE SODIUM 2 G: 2 INJECTION, POWDER, FOR SOLUTION INTRAMUSCULAR; INTRAVENOUS at 15:33

## 2018-05-10 RX ADMIN — FAMOTIDINE 20 MG: 10 INJECTION, SOLUTION INTRAVENOUS at 08:30

## 2018-05-10 RX ADMIN — PREDNISONE 40 MG: 20 TABLET ORAL at 11:39

## 2018-05-10 RX ADMIN — LEVALBUTEROL HYDROCHLORIDE 0.63 MG: 0.63 SOLUTION RESPIRATORY (INHALATION) at 08:54

## 2018-05-10 RX ADMIN — SALINE NASAL SPRAY 2 SPRAY: 1.5 SOLUTION NASAL at 01:03

## 2018-05-10 RX ADMIN — TRAMADOL HYDROCHLORIDE 50 MG: 50 TABLET, FILM COATED ORAL at 02:45

## 2018-05-10 RX ADMIN — METOPROLOL TARTRATE 25 MG: 25 TABLET ORAL at 08:35

## 2018-05-10 RX ADMIN — NYSTATIN 500000 UNITS: 100000 SUSPENSION ORAL at 21:26

## 2018-05-10 RX ADMIN — GUAIFENESIN 600 MG: 600 TABLET, EXTENDED RELEASE ORAL at 08:30

## 2018-05-10 RX ADMIN — SODIUM CHLORIDE 15 MG/HR: 900 INJECTION, SOLUTION INTRAVENOUS at 07:07

## 2018-05-10 RX ADMIN — FAMOTIDINE 20 MG: 20 TABLET ORAL at 18:05

## 2018-05-10 RX ADMIN — PREDNISONE 40 MG: 20 TABLET ORAL at 17:14

## 2018-05-10 RX ADMIN — METOPROLOL TARTRATE 25 MG: 25 TABLET ORAL at 15:33

## 2018-05-10 RX ADMIN — SULFAMETHOXAZOLE AND TRIMETHOPRIM 320 MG: 80; 16 INJECTION, SOLUTION, CONCENTRATE INTRAVENOUS at 11:39

## 2018-05-10 RX ADMIN — METOPROLOL TARTRATE 25 MG: 25 TABLET ORAL at 21:26

## 2018-05-10 RX ADMIN — NYSTATIN 500000 UNITS: 100000 SUSPENSION ORAL at 13:59

## 2018-05-10 RX ADMIN — TRAMADOL HYDROCHLORIDE 50 MG: 50 TABLET, FILM COATED ORAL at 14:09

## 2018-05-10 RX ADMIN — SULFAMETHOXAZOLE AND TRIMETHOPRIM 320 MG: 80; 16 INJECTION, SOLUTION, CONCENTRATE INTRAVENOUS at 17:57

## 2018-05-10 RX ADMIN — HEPARIN SODIUM AND DEXTROSE 16 UNITS/KG/HR: 5000; 5 INJECTION INTRAVENOUS at 18:06

## 2018-05-10 NOTE — PROGRESS NOTES
HOSPITALIST CROSS COVER NOTE    NAME:  Hernan Guevara   Age:  28 y.o.  :   1990   MRN:   [id number]  PCP: Everton Brasher MD  Consulting MD:  Treatment Team: Attending Provider: Mari Nice MD; Consulting Provider: Thee Burr MD; Consulting Provider: Esme Sanchez MD; Consulting Provider: Vitor Medel MD; Consulting Provider: Romario Cross MD; Utilization Review: Gideon Rico RN    REASON FOR NURSING CALL: worsening hypoxia    INTERVAL HISTORY:   Hernan Guevara is a 28y.o. year-old male currently hospitalized for new-diagnosis HIV and concern for PJP pneumonia. Nursing staff called with concern for worsening hypoxia requiring non-rebreather mask. Pt reports that he's feeling a bit better (now on Hi-Rad). Continues to cough up phlegm. Admits to feeling hot. Denies nausea or pain. REVIEW OF SYSTEMS: Comprehensive ROS performed and negative except as stated in HPI. Prior to Admission Medications   Prescriptions Last Dose Informant Patient Reported? Taking? HYDROcodone-homatropine (HYCODAN) 5-1.5 mg/5 mL syrup   No No   Sig: Take 5 mL by mouth four (4) times daily as needed. Max Daily Amount: 20 mL. albuterol (PROVENTIL HFA, VENTOLIN HFA, PROAIR HFA) 90 mcg/actuation inhaler   Yes No   Sig: Take 2 Puffs by inhalation every four (4) hours as needed for Wheezing. fluticasone-vilanterol (BREO ELLIPTA) 100-25 mcg/dose inhaler   Yes No   Sig: Take 1 Puff by inhalation daily. guaiFENesin ER (MUCINEX) 600 mg ER tablet   Yes No   Sig: Take 600 mg by mouth two (2) times a day. loratadine (CLARITIN) 10 mg tablet   Yes No   Sig: Take 10 mg by mouth.       Facility-Administered Medications: None       Objective:     Visit Vitals    /79    Pulse 98    Temp 98.1 °F (36.7 °C)    Resp 20    Ht 6' 2\" (1.88 m)    Wt 64 kg (141 lb 3.2 oz)    SpO2 92%    BMI 18.13 kg/m2      Temp (24hrs), Av.9 °F (37.7 °C), Min:98.1 °F (36.7 °C), Max:102.7 °F (39.3 °C)    Oxygen Therapy  O2 Sat (%): 92 % (05/10/18 1932)  Pulse via Oximetry: 114 beats per minute (05/10/18 1417)  O2 Device: Non-rebreather mask (05/10/18 1932)  O2 Flow Rate (L/min): 15 l/min (05/10/18 1932)  FIO2 (%): 32 % (05/09/18 0200)  Physical Exam:  General:    The patient is a pleasant young male in no distress. Head:   Normocephalic/atraumatic. Lungs:   Scattered crackles bilaterally throughout. No respiratory distress or accessory muscle use. Heart:   Regular rate and rhythm, without murmurs, rubs, or gallops. Abdomen:   Soft, non-tender, non-distended with normoactive bowel sounds. Extremities: Without clubbing, cyanosis, or edema. Skin:     Normal color, texture, and turgor. No rashes, lesions, or jaundice. Pulses: Radial and dorsalis pedis pulses present 2+ bilaterally. Capillary refill <2s. Neurologic: CN II-XII grossly intact and symmetrical.     Moving all four extremities well with no focal deficits. Psychiatric: Pleasant, alert, oriented.     Data Review:   Recent Results (from the past 24 hour(s))   PTT    Collection Time: 05/09/18 10:04 PM   Result Value Ref Range    aPTT 91.9 (HH) 23.2 - 16.2 SEC   METABOLIC PANEL, BASIC    Collection Time: 05/10/18  5:41 AM   Result Value Ref Range    Sodium 141 136 - 145 mmol/L    Potassium 4.5 3.5 - 5.1 mmol/L    Chloride 106 98 - 107 mmol/L    CO2 26 21 - 32 mmol/L    Anion gap 9 7 - 16 mmol/L    Glucose 87 65 - 100 mg/dL    BUN 16 6 - 23 MG/DL    Creatinine 0.93 0.8 - 1.5 MG/DL    GFR est AA >60 >60 ml/min/1.73m2    GFR est non-AA >60 >60 ml/min/1.73m2    Calcium 9.6 8.3 - 10.4 MG/DL   MAGNESIUM    Collection Time: 05/10/18  5:41 AM   Result Value Ref Range    Magnesium 1.8 1.8 - 2.4 mg/dL   LD    Collection Time: 05/10/18  5:41 AM   Result Value Ref Range     (H) 100 - 190 U/L   PTT    Collection Time: 05/10/18  5:41 AM   Result Value Ref Range    aPTT 110.5 (HH) 23.2 - 35.3 SEC   HIV-1,2 P24 AG/AB SCREEN    Collection Time: 05/10/18  5:41 AM   Result Value Ref Range    p24 Antigen NONREACTIVE NR      HIV-1,2 Ab REACTIVE (A) NR     CBC W/O DIFF    Collection Time: 05/10/18  5:41 AM   Result Value Ref Range    WBC 12.0 (H) 4.3 - 11.1 K/uL    RBC 3.52 (L) 4.23 - 5.67 M/uL    HGB 10.7 (L) 13.6 - 17.2 g/dL    HCT 33.7 (L) 41.1 - 50.3 %    MCV 95.7 79.6 - 97.8 FL    MCH 30.4 26.1 - 32.9 PG    MCHC 31.8 31.4 - 35.0 g/dL    RDW 13.1 11.9 - 14.6 %    PLATELET 901 167 - 706 K/uL    MPV 11.4 10.8 - 14.1 FL   BLOOD GAS & LYTES, ARTERIAL    Collection Time: 05/10/18  9:32 AM   Result Value Ref Range    pH 7.43 7.35 - 7.45      PCO2 42 35 - 45 mmHg    PO2 54 (L) 80 - 105 mmHg    BICARBONATE 27 (H) 22 - 26 mmol/L    BASE EXCESS 2.3 0 - 3 mmol/L    TOTAL HEMOGLOBIN 11.5 (L) 11.7 - 15.0 GM/DL    O2 SAT 88 (L) 92 - 98.5 %    Arterial O2 Hgb 87.1 (L) 94 - 97 %    CARBOXYHEMOGLOBIN 0.7 0.5 - 1.5 %    METHEMOGLOBIN 0.0 0.0 - 1.5 %    DEOXYHEMOGLOBIN 12 (H) 0.0 - 5.0 %    Sodium 136.8 135 - 148 MMOL/L    POTASSIUM 4.09 3.5 - 5.3 MMOL/L    CHLORIDE 105 98 - 106      Calcium, ionized 1.28 1.0 - 1.3 mmol/L    SITE LB     ALLENS TEST NA     MODE NC     O2 FLOW 5.00 L/min    Respiratory comment: Casandra GALVEZ at 5 10 2018 9 39 20 AM. Read back.     PTT    Collection Time: 05/10/18 12:07 PM   Result Value Ref Range    aPTT 111.9 (HH) 23.2 - 35.3 SEC   RPR    Collection Time: 05/10/18 12:07 PM   Result Value Ref Range    RPR PENDING    HEP B SURFACE AB    Collection Time: 05/10/18 12:07 PM   Result Value Ref Range    Hepatitis B surface Ab 120.19 mIU/mL   BLOOD GAS, ARTERIAL    Collection Time: 05/10/18  7:20 PM   Result Value Ref Range    pH 7.37 7.35 - 7.45      PCO2 43 35 - 45 mmHg    PO2 69 (L) 80 - 105 mmHg    BICARBONATE 25 22 - 26 mmol/L    BASE DEFICIT 0.8 0 - 2 mmol/L    TOTAL HEMOGLOBIN 11.4 (L) 11.7 - 15.0 GM/DL    O2 SAT 93 92 - 98.5 %    Arterial O2 Hgb 92.3 (L) 94 - 97 %    CARBOXYHEMOGLOBIN 0.4 (L) 0.5 - 1.5 %    METHEMOGLOBIN 0.0 0.0 - 1.5 % DEOXYHEMOGLOBIN 7 (H) 0.0 - 5.0 %    SITE LR     ALLENS TEST POSITIVE      MODE NRB     FIO2 100.0 %       Imaging /Procedures /Studies:  No results found. Assessment and Plan: Active Hospital Problems    Diagnosis Date Noted    [facility] car[facility]ii pneumonia) [facility] 05/10/2018    HIV positive ([facility] 05/10/2018    Respiratory failure with hypoxia ([facility] 05/10/2018    Sepsis ([facility] 05/10/2018    Hypoxia 05/09/2018    New onset a-fib ([facility] 05/08/2018       - Worsening hypoxia. Respiratory failure. ABG shows pO2 of only 69 on 100% FiO2. Concern for developing ARDS as is not uncommon after initial treatment of PJP pneumonia. Already on solumedrol. Will transfer to ICU for closer monitoring.    - Disposition: Transfer to ICU    - Code Status: FULL CODE    - Critical care time: 35 minutes  More than 50% of the time documented was spent in face-to-face contact with the patient and in the care of the patient on the floor/unit where the patient is located.     Signed By: Becky Gale MD     May 10, 2018

## 2018-05-10 NOTE — PROGRESS NOTES
Patient's O2 sat of 79% on 10 liters of oxygen. Patient placed on non-re-breather mask. Paged/Spoke with Dr. Paige Chino. Stated would send pm MD to assess. Spoke with respiratory. Respiratory to assess patient.

## 2018-05-10 NOTE — CONSULTS
Infectious Disease Consult    Today's Date: 5/10/2018   Admit Date: 5/8/2018    Impression:   · Pneumonia - CAP v PJP - agree with empiric therapy and prednisone 40 mg BID for 5 days followed by a taper for 21 days. Will need secondary ppx with Bactrim 1 SS daily after treatment is complete  · HIV screen positive, we are awaiting confirmatory testing (which is sent out here), but by hx, suspect this is a true positive, risk MSM, denies hx of IVDU - results and implications discussed at length with patient - VL pending, genotype pending  · Hx of syphilis s/p PCN per patient a few years ago  · Thrush  · AFib    Plan:   · HIV CD4, PCR, HIV genotype, RPR, Hep B, C, urine G/C  · Continue Bactrim 5 mg/kg TID and Predisone 40 mg BID for 5 days both for 21 days with prednisone taper, then will likely need secondary ppx  · Continue CTX/azithro, can change to levofloxacin on discharge  · Nystatin s/s   · Patient counseled at length on the potential diagnosis    Anti-infectives:     Inpat Anti-Infectives     Start     Ordered Stop    05/10/18 1000  trimethoprim-sulfamethoxazole (BACTRIM) 320 mg in dextrose 5% 500 mL  15 mg/kg/day,   IntraVENous,   EVERY 8 HOURS      05/10/18 0913 --    05/09/18 1700  azithromycin (ZITHROMAX) 500 mg in 0.9% sodium chloride (MBP/ADV) 250 mL  500 mg,   IntraVENous,   EVERY 24 HOURS      05/08/18 1817 --    05/09/18 1600  cefTRIAXone (ROCEPHIN) 2 g in 0.9% sodium chloride (MBP/ADV) 50 mL  2 g,   IntraVENous,   EVERY 24 HOURS      05/08/18 1735 05/16/18 1559          Subjective:   Date of Consultation:  May 10, 2018  Referring Physician:     Patient is a 28 y.o. male with a hx of allergic rhinitis and asthma who presented with shortness of breath. He was started on steroids and azithromycin with progression of sx. 5/2 CXR unremarkable. He presented to the ED with  bpm and CXR and CT showed bilateral opacities, wbc 14, fever 102. He was hypoxic and required 4L 02.  He was started on CTX/azithro. 5/8 BC NG, sputum cx NRF. He was started on Bactrim and prednisone 40 mg BID today. UDS positive for multiple drugs. HIV screen positive. He notes insidious onset a few weeks ago of SOB and dry cough. + occasional nausea, no diarrhea. No weight loss, no skin rashes. He has a hx of syphilis s/p rx with PCN. He was last screened for HIV in 2015 after a sexual encounter and was negative at the time. Patient Active Problem List   Diagnosis Code    Asthma J45.909    Migraines G43.909    Essential hypertension I10    Non-seasonal allergic rhinitis due to pollen J30.1    Pneumonia J18.9    New onset a-fib ([facility] I48.91    Hypoxia R09.02     Past Medical History:   Diagnosis Date    Asthma     Migraines       Family History   Problem Relation Age of Onset    Cancer Mother 52     breast     Hypertension Mother     Diabetes Father    Ami Barrs Migraines Sister     Other Brother      crohnes      Social History   Substance Use Topics    Smoking status: Former Smoker     Packs/day: 1.00     Years: 5.00     Types: Cigarettes     Quit date: 02/2018    Smokeless tobacco: Never Used    Alcohol use Yes      Comment: occ. History reviewed. No pertinent surgical history. Prior to Admission medications    Medication Sig Start Date End Date Taking? Authorizing Provider   fluticasone-vilanterol (BREO ELLIPTA) 100-25 mcg/dose inhaler Take 1 Puff by inhalation daily. Historical Provider   HYDROcodone-homatropine (HYCODAN) 5-1.5 mg/5 mL syrup Take 5 mL by mouth four (4) times daily as needed. Max Daily Amount: 20 mL. 5/1/18   Mendel Liter, MD   loratadine (CLARITIN) 10 mg tablet Take 10 mg by mouth. Historical Provider   guaiFENesin ER (MUCINEX) 600 mg ER tablet Take 600 mg by mouth two (2) times a day. Historical Provider   albuterol (PROVENTIL HFA, VENTOLIN HFA, PROAIR HFA) 90 mcg/actuation inhaler Take 2 Puffs by inhalation every four (4) hours as needed for Wheezing.     Historical Provider No Known Allergies     Review of Systems:  A comprehensive review of systems was negative except for that written in the History of Present Illness. Objective:     Visit Vitals    /79    Pulse (!) 101    Temp 100.3 °F (37.9 °C)    Resp 18    Ht 6' 2\" (1.88 m)    Wt 64 kg (141 lb 3.2 oz)    SpO2 90%    BMI 18.13 kg/m2     Temp (24hrs), Av.1 °F (37.8 °C), Min:98.3 °F (36.8 °C), Max:102.7 °F (39.3 °C)       Lines: PIVs    Physical Exam:    General:  Alert, cooperative, well noursished, appears uncomfortable   Eyes:  Sclera anicteric. Pupils equally round and reactive to light. Mouth/Throat: +thrush   Neck: Supple   Lungs:   Diffuse crackles   CV:  tachy,no murmur, click, rub or gallop   Abdomen:   Soft, non-tender.  bowel sounds normal. non-distended   Extremities: No cyanosis or edema   Skin: Skin color, texture, turgor normal. no acute rash or lesions   Lymph nodes: Cervical and supraclavicular normal   Musculoskeletal: No swelling or deformity   Lines/Devices:  Intact, no erythema, drainage or tenderness   Psych: Alert and oriented, normal mood affect given the setting       Data Review:     CBC:  Recent Labs      05/10/18   0541  05/09/18   0453  05/08/18   2050  05/08/18   1445   WBC  12.0*  10.1  9.3  14.0*   GRANS   --    --    --   88*   MONOS   --    --    --   4   EOS   --    --    --   1   ANEU   --    --    --   12.3*   ABL   --    --    --   1.0   HGB  10.7*  11.3*  13.6  13.1*   HCT  33.7*  34.0*  40.4*  37.7*   PLT  299  250  270  309       BMP:  Recent Labs      05/10/18   0541  05/09/18   0453  05/08/18   2050   CREA  0.93  1.03  1.04   BUN  16  19  25*   NA  141  143  144   K  4.5  4.4  4.1   CL  106  108*  110*   CO2  26  29  25   AGAP  9  6*  9   GLU  87  97  108*       LFTS:  Recent Labs      18   0453  18   1445   TBILI  0.2  0.4  0.3   ALT  10*  11*  12   SGOT  53*  51*  51*   AP  50  56  61   TP  7.8  8.3*  8.5*   ALB  1.9*  2.0*  2.1* Microbiology:     All Micro Results     Procedure Component Value Units Date/Time    CULTURE, RESPIRATORY/SPUTUM/BRONCH Yao Doran [[id number]] Collected:  05/09/18 1131    Order Status:  Completed Specimen:  Sputum Updated:  05/10/18 0812     Special Requests: NO SPECIAL REQUESTS        GRAM STAIN 0 TO 30 WBC'S/OIF      0 TO 4 EPITHELIAL CELLS SEEN /OIF              MODERATE GRAM NEGATIVE RODS      FEW GRAM POSITIVE RODS         FEW YEAST         1 + MUCUS     Culture result:         SCANT NORMAL RESPIRATORY KODY    CULTURE, BLOOD [[id number]] Collected:  05/08/18 1547    Order Status:  Completed Specimen:  Whole Blood from Blood Updated:  05/10/18 0642     Special Requests: --        RIGHT  HAND       Culture result: NO GROWTH 2 DAYS       CULTURE, BLOOD [[id number]] Collected:  05/08/18 1547    Order Status:  Completed Specimen:  Whole Blood from Blood Updated:  05/10/18 0642     Special Requests: LEFT FOREARM        Culture result: NO GROWTH 2 DAYS       CULTURE, RESPIRATORY/SPUTUM/BRONCH Nathaly Card STAIN [[id number]]     Order Status:  Sent Specimen:  Sputum from Sputum     INFLUENZA A & B AG (RAPID TEST) [[id number]] Collected:  05/08/18 1731    Order Status:  Completed Specimen:  Nasopharyngeal from Nasal washing Updated:  05/08/18 1750     Influenza A Ag NEGATIVE          NEGATIVE FOR THE PRESENCE OF INFLUENZA A ANTIGEN  INFECTION DUE TO INFLUENZA A CANNOT BE RULED OUT. BECAUSE THE ANTIGEN PRESENT IN THE SAMPLE MAY BE BELOW  THE DETECTION LIMIT OF THE TEST. A NEGATIVE TEST IS PRESUMPTIVE AND IT IS RECOMMENDED THAT THESE RESULTS BE CONFIRMED BY VIRAL CULTURE OR AN FDA-CLEARED INFLUENZA A AND B MOLECULAR ASSAY. Influenza B Ag NEGATIVE          NEGATIVE FOR THE PRESENCE OF INFLUENZA B ANTIGEN  INFECTION DUE TO INFLUENZA B CANNOT BE RULED OUT. BECAUSE THE ANTIGEN PRESENT IN THE SAMPLE MAY BE BELOW  THE DETECTION LIMIT OF THE TEST.   A NEGATIVE TEST IS PRESUMPTIVE AND IT IS RECOMMENDED THAT THESE RESULTS BE CONFIRMED BY VIRAL CULTURE OR AN FDA-CLEARED INFLUENZA A AND B MOLECULAR ASSAY. Imaging:   CXR:  IMPRESSION: Worsening opacities in the lungs may be edema. CT:  Impression:   1. Diffuse perihilar pulmonary infiltrates. FINDINGS are nonspecific and may  represent atypical appearance of edema, infection, other alveolar process such  as can be seen with pulmonary hemorrhage.     Signed By: Oscar Talley MD     May 10, 2018

## 2018-05-10 NOTE — PROGRESS NOTES
Bedside and Verbal shift change report given to Bernard Davidson (oncoming nurse) by self Ana Argue nurse). Report included the following information SBAR, Kardex and Recent Results.

## 2018-05-10 NOTE — PROGRESS NOTES
Problem: Nutrition Deficit  Goal: *Optimize nutritional status  Nutrition  Reason for assessment: Referral received from nursing admission Malnutrition Screening Tool for recently lost 2-13# without trying and eating poorly due to decreased appetite. Assessment:   Diet order(s): Cardiac with ensure enlive TID  Food/Nutrition Patient History:  Patient reports poor meal/fluid intake since Saturday, reporting a UBW of ~150 pounds and recent wt loss d/t decreased appetite. He states that yesterday he consumed liquids and applesauce. He denies any significant altered GI function. Note newly diagnosed HIV positive today and positive drug screen upon admission which could be potential etiology of wt loss. Anthropometrics:Height: 6' 2\" (188 cm),  Weight: 64 kg (141 lb 3.2 oz), Weight Source: Standing scale (comment), Body mass index is 18.13 kg/(m^2). BMI class of normal weight for age < 72 years. WT / BMI 5/10/2018 5/1/2018 4/27/2018   WEIGHT 141 lb 3.2 oz 152 lb 152 lb   Per weights in EMR, potential for an 11 pound, 7.2% clinically significant weight loss in within 1 month. Macronutrient needs:  EER:  5414-5564 kcal /day (25-30 kcal/kg listed BW)  EPR:  64-77 grams protein/day (1-1.2 grams/kg listed BW)  Intake/Comparative Standards: Per RD meal rounds: 0% of breakfast. No recorded meal intakes. Nutrition Diagnosis: Inadequate oral intake r/t decreased ability to consume sufficient oral intake as evidenced by patient with weight loss noted above after having decreased PO intake for ~1 week PTA, borderline underweight for age. Intervention:  Meals and snacks: Continue current diet. Nutrition Supplement Therapy: continue ensure enlive TID-flavor preference noted  Provided patient with an ensure to have this morning. Discharge nutrition plan: Too soon to determine.     Beverly Rodriguez Sanjeev [address], [address], [facility] [address] [place] [address], [phone]

## 2018-05-10 NOTE — PROGRESS NOTES
Allen Loomis  Admission Date: 5/8/2018             Daily Progress Note: 5/10/2018    The patient's chart is reviewed and the patient is discussed with the staff. Admitted with cough, dyspnea and hypoxia. CXR/CT with bilateral infiltrates. Has tested positive for HIV (new diagnosis). ID has seen and bactrim added for coverage for PJP. Hypoxic and remains on high flow oxygen. Subjective:      Feels stronger today. Reports meeting with Dr. Toño Ruby and understands plan.      Current Facility-Administered Medications   Medication Dose Route Frequency    trimethoprim-sulfamethoxazole (BACTRIM) 320 mg in dextrose 5% 500 mL  15 mg/kg/day IntraVENous Q8H    predniSONE (DELTASONE) tablet 40 mg  40 mg Oral BID WITH MEALS    Followed by   Azul Mcfadden ON 5/15/2018] predniSONE (DELTASONE) tablet 40 mg  40 mg Oral DAILY WITH BREAKFAST    Followed by   Azul Mcfadden ON 5/20/2018] predniSONE (DELTASONE) tablet 20 mg  20 mg Oral DAILY WITH BREAKFAST    nystatin (MYCOSTATIN) 100,000 unit/mL oral suspension 500,000 Units  500,000 Units Oral QID    metoprolol tartrate (LOPRESSOR) tablet 25 mg  25 mg Oral Q6H    sodium chloride (OCEAN) 0.65 % nasal squeeze bottle 2 Spray  2 Spray Both Nostrils Q2H PRN    guaiFENesin ER (MUCINEX) tablet 600 mg  600 mg Oral BID    HYDROcodone-homatropine (HYCODAN) 5-1.5 mg/5 mL (5 mL) syrup 5 mL  5 mL Oral Q6H PRN    venlafaxine-SR (EFFEXOR-XR) capsule 37.5 mg  37.5 mg Oral DAILY    budesonide (PULMICORT) 500 mcg/2 ml nebulizer suspension  500 mcg Nebulization BID RT    heparin 25,000 units in dextrose 500 mL infusion  12-25 Units/kg/hr IntraVENous TITRATE    dilTIAZem (CARDIZEM) 100 mg in 0.9% sodium chloride (MBP/ADV) 100 mL infusion  0-20 mg/hr IntraVENous TITRATE    acetaminophen (TYLENOL) tablet 650 mg  650 mg Oral Q6H PRN    traMADol (ULTRAM) tablet 50 mg  50 mg Oral Q6H PRN    famotidine (PF) (PEPCID) 20 mg in sodium chloride 0.9% 10 mL injection  20 mg IntraVENous Q12H    0.9% sodium chloride infusion  100 mL/hr IntraVENous CONTINUOUS    cefTRIAXone (ROCEPHIN) 2 g in 0.9% sodium chloride (MBP/ADV) 50 mL  2 g IntraVENous Q24H    azithromycin (ZITHROMAX) 500 mg in 0.9% sodium chloride (MBP/ADV) 250 mL  500 mg IntraVENous Q24H    levalbuterol (XOPENEX) nebulizer soln 0.63 mg/3 mL  0.63 mg Nebulization Q6H RT         Objective:     Vitals:    05/10/18 0432 05/10/18 0834 05/10/18 0945 05/10/18 1037   BP: 109/72 133/79  110/74   Pulse: 99 (!) 101  (!) 104   Resp: 18   20   Temp: 100.3 °F (37.9 °C)   98.9 °F (37.2 °C)   SpO2: 90%  92% 94%   Weight: 141 lb 3.2 oz (64 kg)      Height:         Intake and Output:   05/08 1901 - 05/10 0700  In: 1016 [P.O.:360; I.V.:656]  Out: 1000 [Urine:1000]  05/10 0701 - 05/10 1900  In: 5061 [P.O.:360; I.V.:1465]  Out: 450 [Urine:450]    Physical Exam:   Constitutional:  the patient is well developed and in no acute distress  HEENT:  Sclera clear, pupils equal, oral mucosa moist  Lungs: clear bilaterally. Wearing 9 liter high flow cannula. Cardiovascular:  Irregular and without M,G,R. A fib per telemetry  Abd/GI: soft and non-tender; with positive bowel sounds. Ext: warm without cyanosis. There is no lower leg edema. Musculoskeletal: moves all four extremities with equal strength  Skin:  no jaundice or rashes, no wounds   Neuro: no gross neuro deficits   Musculoskeletal: can ambulate. No deformity  Psychiatric: Calm. ROS:  Decreased appetite, cough, dyspnea, weak  Lines: peripheral IV    CHEST XRAY:       Echo: Left ventricle: Systolic function was at the lower limits of normal.  Ejection fraction was estimated in the range of 50 % to 55 %. There were no  regional wall motion abnormalities. -  Atrial septum: Agitated saline contrast injection (bubble study) was  performed. There was no right-to-left shunt, with provocative maneuvers to  increase right atrial pressure. -  Mitral valve: There was mild regurgitation.   -  Tricuspid valve: There was mild regurgitation. -  Pericardium: A small pericardial effusion was identified posterior to the  heart. There was no evidence of hemodynamic compromise      LAB  Recent Labs      05/10/18   0541  05/09/18   0453  05/08/18 2050 05/08/18   1445   WBC  12.0*  10.1  9.3  14.0*   HGB  10.7*  11.3*  13.6  13.1*   HCT  33.7*  34.0*  40.4*  37.7*   PLT  299  250  270  309   INR   --    --    --   1.3     Recent Labs      05/10/18   0541  05/09/18   0453  05/08/18 2050 05/08/18   1445   NA  141  143  144  141   K  4.5  4.4  4.1  4.2   CL  106  108*  110*  107   CO2  26  29  25  26   GLU  87  97  108*  92   BUN  16  19  25*  27*   CREA  0.93  1.03  1.04  1.20   MG  1.8  1.9  2.1  2.0   ALB   --   1.9*  2.0*  2.1*   SGOT   --   53*  51*  51*     Recent Labs      05/10/18   0932   PH  7.43   PCO2  42   PO2  54*   HCO3  27*         Assessment:  (Medical Decision Making)     Hospital Problems  Never Reviewed          Codes Class Noted POA    PCP (pneumocystis carinii pneumonia) ([facility] ICD-10-CM: B59  ICD-9-CM: 136.3  5/10/2018 Yes    Presumed. Seen by ID - now on Bactrim    HIV positive ([facility] ICD-10-CM: Z21  ICD-9-CM: V08  5/10/2018 Yes    New diagnosis    Respiratory failure with hypoxia ([facility] ICD-10-CM: J96.91  ICD-9-CM: 518.81  5/10/2018 Yes    High flow cannula - associated with pneumonia    Sepsis ([facility] ICD-10-CM: A41.9  ICD-9-CM: 038.9, 995.91  5/10/2018 Yes    Secondary to pneumonia    Hypoxia ICD-10-CM: R09.02  ICD-9-CM: 799.02  5/9/2018 Yes    As above    New onset a-fib [facility]) ICD-10-CM: I48.91  ICD-9-CM: 427.31  5/8/2018 Yes    Cardiology following. cardizem for rate control, on heparin gtt          Plan:  (Medical Decision Making)   1. ID has seen and bactrim added. HIV test positive. ? PJP. Prednisone started as well. Day 2 rocephin/zithromax. Sputum and blood cultures neg so far  2. Oxygen support - monitor needs and wean as tolerated  3.  Cardiology managing a fib    Ranjan Yin, NP    More than 50% of time documented was spent face-to-face contact with the patient and in the care of the patient on the floor/unit where the patient is located. Lungs:  Few scattered crackles  Heart:  Tachy RRR with no Murmur/Rubs/Gallops    Additional Comments:  FIO2 requirement up to 10L. Empirically treating for PJP and CAP. If oxygenation continues to deteriorate, may need ICU transfer and bronch/BAL. Will clarify with ID whether BAL desired even if he improves on empiric Rx. Will get beta D-glucan. I have spoken with and examined the patient. I agree with the above assessment and plan as documented.     Timothy Moralse MD

## 2018-05-10 NOTE — PROGRESS NOTES
THis is a patient with HIV and high suspicion for PCP pneumonia. Started on IV septra + prednisone today. Patient was seen at bedside. He seems to be in worse respiratory distress. PO2 is 69 on a non rebreathing mask. Respiratory rate is 30-32. Started on a optiflow. Will be transferred to the ICU for proper monitoring. High likelihood of deterioration.

## 2018-05-10 NOTE — PROGRESS NOTES
Progress Note    Patient: July Majano MRN: [id number]  SSN: [id number]    YOB: 1990  Age: 28 y.o. Sex: male      Admit Date: 5/8/2018    LOS: 2 days     Subjective: This is a 29 YO male patient admitted yesterday with a-flutter and RVR and bilateral lung infiltrates. Started on a cardizem and heparin drip. He was also started on IV antibiotics. Chest ct scan reported possible infection vs edema vs hemorrhage. Today his rapid HIV test was reported positive. Confirmation still pending but the clinical suspicion for HIV infection is very high ( male having sex with male, illicit drug use). Also, I have ordered an LDH test which is elevated, this together with his very abnormal chest x ray and hypoxia make me believe he has PCP pneumonia. I will start him on IV TMP-SMX, steroids and will consult ID. Objective:     Vitals:    05/10/18 0116 05/10/18 0245 05/10/18 0432 05/10/18 0834   BP:  125/74 109/72 133/79   Pulse:  (!) 123 99 (!) 101   Resp:   18    Temp: 100.2 °F (37.9 °C)  100.3 °F (37.9 °C)    SpO2: 91%  90%    Weight:   64 kg (141 lb 3.2 oz)    Height:            Intake and Output:  Current Shift: 05/10 0701 - 05/10 1900  In: 1465 [I.V.:1465]  Out: -   Last three shifts: 05/08 1901 - 05/10 0700  In: 1016 [P.O.:360; I.V.:656]  Out: 1000 [Urine:1000]    Physical Exam:   GENERAL: alert, cooperative, mild distress  EYE: conjunctivae/corneas clear. PERRL, EOM's intact. Fundi benign  LYMPHATIC: Cervical, supraclavicular, and axillary nodes normal.   THROAT & NECK: normal and no erythema or exudates noted. LUNG: clear to auscultation bilaterally  HEART: irregularly irregular rate and rhythm   ABDOMEN: soft, non-tender. Bowel sounds normal. No masses,  no organomegaly  EXTREMITIES:  extremities normal, atraumatic, no cyanosis or edema  SKIN: Normal.  NEUROLOGIC: no focal deficits   PSYCHIATRIC: non focal    Lab/Data Review:  Lab results reviewed.  For significant abnormal values and values requiring intervention, see assessment and plan. Assessment:     Active Problems:    Pneumonia (5/8/2018)      New onset a-fib ([address] (5/8/2018)      Hypoxia (5/9/2018)        Plan:     #  Possible  PCP pneumonia  -HIV +  -will order IV TMP -SMX 15 mg /Kg/ day divided in 3 doses   -ABG ordered, however I think that is evident he has severe disease and will start him on steroids ( 40 mg prednisone bid for 5 days, 40 mg daily for 5 days, 20 mg daily for 11 days)   -ID to see him   -pulmonary on board. May need bronch to confirm disease       #  Atrial fibrillation/ atrial flutter  with rapid ventricular response.    -continue cardizem drip   -continue heparin drip   -ECHO WNL   -cardiology on board     # HIV positive   -new diagnosis  -ID consulted     #. Deep venous thrombosis prophylaxis.   Heparin drip     Signed By: Leandro Tanner MD     May 10, 2018

## 2018-05-11 ENCOUNTER — APPOINTMENT (OUTPATIENT)
Dept: GENERAL RADIOLOGY | Age: 28
DRG: 974 | End: 2018-05-11
Attending: INTERNAL MEDICINE
Payer: COMMERCIAL

## 2018-05-11 LAB
ANION GAP SERPL CALC-SCNC: 9 MMOL/L (ref 7–16)
APTT PPP: 116.6 SEC (ref 23.2–35.3)
ATRIAL RATE: 178 BPM
BACTERIA SPEC CULT: NORMAL
BASOPHILS # BLD AUTO: 0 X10E3/UL (ref 0–0.2)
BASOPHILS NFR BLD AUTO: 0 %
BUN SERPL-MCNC: 16 MG/DL (ref 6–23)
C TRACH RRNA SPEC QL NAA+PROBE: NEGATIVE
CALCIUM SERPL-MCNC: 9.1 MG/DL (ref 8.3–10.4)
CALCULATED R AXIS, ECG10: 87 DEGREES
CALCULATED T AXIS, ECG11: 71 DEGREES
CD3+CD4+ CELLS # BLD: 25 /UL (ref 359–1519)
CD3+CD4+ CELLS NFR BLD: 4.1 % (ref 30.8–58.5)
CHLORIDE SERPL-SCNC: 103 MMOL/L (ref 98–107)
CO2 SERPL-SCNC: 26 MMOL/L (ref 21–32)
CREAT SERPL-MCNC: 0.78 MG/DL (ref 0.8–1.5)
DIAGNOSIS, 93000: NORMAL
EOSINOPHIL # BLD AUTO: 0.1 X10E3/UL (ref 0–0.4)
EOSINOPHIL NFR BLD AUTO: 0 %
ERYTHROCYTE [DISTWIDTH] IN BLOOD BY AUTOMATED COUNT: 12.8 % (ref 11.9–14.6)
ERYTHROCYTE [DISTWIDTH] IN BLOOD BY AUTOMATED COUNT: 13.2 % (ref 12.3–15.4)
GLUCOSE SERPL-MCNC: 137 MG/DL (ref 65–100)
GRAM STN SPEC: NORMAL
HCT VFR BLD AUTO: 33 % (ref 37.5–51)
HCT VFR BLD AUTO: 33.5 % (ref 41.1–50.3)
HCV AB S/CO SERPL IA: 0.1 S/CO RATIO (ref 0–0.9)
HCV AB SERPL QL IA: NORMAL
HGB BLD-MCNC: 11 G/DL (ref 13–17.7)
HGB BLD-MCNC: 11.1 G/DL (ref 13.6–17.2)
HIV 1 & 2 AB SER-IMP: ABNORMAL
HIV 1+2 AB+HIV1 P24 AG SERPL QL IA: REACTIVE
HIV 2 AB SERPL QL IA: NEGATIVE
HIV1 AB SERPL QL IA: POSITIVE
HIV1 RNA # SERPL NAA+PROBE: NORMAL COPIES/ML
HIV1 RNA SERPL NAA+PROBE-LOG#: 6.04 LOG10COPY/ML
IMM GRANULOCYTES # BLD: 0 X10E3/UL (ref 0–0.1)
IMM GRANULOCYTES NFR BLD: 0 %
LYMPHOCYTES # BLD AUTO: 0.6 X10E3/UL (ref 0.7–3.1)
LYMPHOCYTES NFR BLD AUTO: 5 %
MCH RBC QN AUTO: 31.1 PG (ref 26.1–32.9)
MCH RBC QN AUTO: 31.2 PG (ref 26.6–33)
MCHC RBC AUTO-ENTMCNC: 33.1 G/DL (ref 31.4–35)
MCHC RBC AUTO-ENTMCNC: 33.3 G/DL (ref 31.5–35.7)
MCV RBC AUTO: 93.8 FL (ref 79.6–97.8)
MCV RBC AUTO: 94 FL (ref 79–97)
MONOCYTES # BLD AUTO: 0.2 X10E3/UL (ref 0.1–0.9)
MONOCYTES NFR BLD AUTO: 2 %
N GONORRHOEA RRNA SPEC QL NAA+PROBE: NEGATIVE
NEUTROPHILS # BLD AUTO: 12.7 X10E3/UL (ref 1.4–7)
NEUTROPHILS NFR BLD AUTO: 93 %
PLATELET # BLD AUTO: 259 K/UL (ref 150–450)
PLATELET # BLD AUTO: 307 X10E3/UL (ref 150–379)
PMV BLD AUTO: 11 FL (ref 10.8–14.1)
POTASSIUM SERPL-SCNC: 4.5 MMOL/L (ref 3.5–5.1)
Q-T INTERVAL, ECG07: 336 MS
QRS DURATION, ECG06: 86 MS
QTC CALCULATION (BEZET), ECG08: 408 MS
RBC # BLD AUTO: 3.53 X10E6/UL (ref 4.14–5.8)
RBC # BLD AUTO: 3.57 M/UL (ref 4.23–5.67)
RPR SER QL: NONREACTIVE
SERVICE CMNT-IMP: NORMAL
SODIUM SERPL-SCNC: 138 MMOL/L (ref 136–145)
SPECIMEN SOURCE: NORMAL
VENTRICULAR RATE, ECG03: 89 BPM
WBC # BLD AUTO: 13.7 X10E3/UL (ref 3.4–10.8)
WBC # BLD AUTO: 9.1 K/UL (ref 4.3–11.1)

## 2018-05-11 PROCEDURE — 71045 X-RAY EXAM CHEST 1 VIEW: CPT

## 2018-05-11 PROCEDURE — 77010033678 HC OXYGEN DAILY

## 2018-05-11 PROCEDURE — 99232 SBSQ HOSP IP/OBS MODERATE 35: CPT | Performed by: INTERNAL MEDICINE

## 2018-05-11 PROCEDURE — 74011250637 HC RX REV CODE- 250/637: Performed by: INTERNAL MEDICINE

## 2018-05-11 PROCEDURE — 94640 AIRWAY INHALATION TREATMENT: CPT

## 2018-05-11 PROCEDURE — 74011250636 HC RX REV CODE- 250/636: Performed by: INTERNAL MEDICINE

## 2018-05-11 PROCEDURE — 74011000258 HC RX REV CODE- 258: Performed by: INTERNAL MEDICINE

## 2018-05-11 PROCEDURE — 85027 COMPLETE CBC AUTOMATED: CPT | Performed by: INTERNAL MEDICINE

## 2018-05-11 PROCEDURE — 65270000029 HC RM PRIVATE

## 2018-05-11 PROCEDURE — 74011000250 HC RX REV CODE- 250: Performed by: INTERNAL MEDICINE

## 2018-05-11 PROCEDURE — 94760 N-INVAS EAR/PLS OXIMETRY 1: CPT

## 2018-05-11 PROCEDURE — 36415 COLL VENOUS BLD VENIPUNCTURE: CPT | Performed by: INTERNAL MEDICINE

## 2018-05-11 PROCEDURE — 80048 BASIC METABOLIC PNL TOTAL CA: CPT | Performed by: INTERNAL MEDICINE

## 2018-05-11 PROCEDURE — 93005 ELECTROCARDIOGRAM TRACING: CPT | Performed by: PHYSICIAN ASSISTANT

## 2018-05-11 PROCEDURE — 85730 THROMBOPLASTIN TIME PARTIAL: CPT | Performed by: INTERNAL MEDICINE

## 2018-05-11 PROCEDURE — 74011636637 HC RX REV CODE- 636/637: Performed by: INTERNAL MEDICINE

## 2018-05-11 RX ORDER — SULFAMETHOXAZOLE AND TRIMETHOPRIM 800; 160 MG/1; MG/1
2 TABLET ORAL EVERY 8 HOURS
Status: DISCONTINUED | OUTPATIENT
Start: 2018-05-11 | End: 2018-05-15 | Stop reason: HOSPADM

## 2018-05-11 RX ORDER — AZITHROMYCIN 250 MG/1
500 TABLET, FILM COATED ORAL EVERY EVENING
Status: DISCONTINUED | OUTPATIENT
Start: 2018-05-11 | End: 2018-05-14

## 2018-05-11 RX ADMIN — METOPROLOL TARTRATE 25 MG: 25 TABLET ORAL at 08:17

## 2018-05-11 RX ADMIN — APIXABAN 5 MG: 5 TABLET, FILM COATED ORAL at 09:00

## 2018-05-11 RX ADMIN — GUAIFENESIN 600 MG: 600 TABLET, EXTENDED RELEASE ORAL at 18:26

## 2018-05-11 RX ADMIN — AZITHROMYCIN 500 MG: 250 TABLET, FILM COATED ORAL at 18:27

## 2018-05-11 RX ADMIN — LEVALBUTEROL HYDROCHLORIDE 0.63 MG: 0.63 SOLUTION RESPIRATORY (INHALATION) at 13:30

## 2018-05-11 RX ADMIN — PREDNISONE 40 MG: 20 TABLET ORAL at 16:25

## 2018-05-11 RX ADMIN — SULFAMETHOXAZOLE AND TRIMETHOPRIM 320 MG: 80; 16 INJECTION, SOLUTION, CONCENTRATE INTRAVENOUS at 10:19

## 2018-05-11 RX ADMIN — FAMOTIDINE 20 MG: 20 TABLET ORAL at 18:27

## 2018-05-11 RX ADMIN — BUDESONIDE 500 MCG: 0.5 INHALANT RESPIRATORY (INHALATION) at 08:54

## 2018-05-11 RX ADMIN — METOPROLOL TARTRATE 25 MG: 25 TABLET ORAL at 03:36

## 2018-05-11 RX ADMIN — HYDROCODONE BITARTRATE AND HOMATROPINE METHYLBROMIDE 5 ML: 5; 1.5 SOLUTION ORAL at 21:41

## 2018-05-11 RX ADMIN — NYSTATIN 500000 UNITS: 100000 SUSPENSION ORAL at 21:41

## 2018-05-11 RX ADMIN — LEVALBUTEROL HYDROCHLORIDE 0.63 MG: 0.63 SOLUTION RESPIRATORY (INHALATION) at 21:15

## 2018-05-11 RX ADMIN — BUDESONIDE 500 MCG: 0.5 INHALANT RESPIRATORY (INHALATION) at 21:15

## 2018-05-11 RX ADMIN — NYSTATIN 500000 UNITS: 100000 SUSPENSION ORAL at 12:23

## 2018-05-11 RX ADMIN — DILTIAZEM HYDROCHLORIDE 180 MG: 180 CAPSULE, COATED, EXTENDED RELEASE ORAL at 08:17

## 2018-05-11 RX ADMIN — METOPROLOL TARTRATE 25 MG: 25 TABLET ORAL at 16:25

## 2018-05-11 RX ADMIN — GUAIFENESIN 600 MG: 600 TABLET, EXTENDED RELEASE ORAL at 08:18

## 2018-05-11 RX ADMIN — SULFAMETHOXAZOLE AND TRIMETHOPRIM 2 TABLET: 800; 160 TABLET ORAL at 16:25

## 2018-05-11 RX ADMIN — METOPROLOL TARTRATE 25 MG: 25 TABLET ORAL at 21:41

## 2018-05-11 RX ADMIN — APIXABAN 5 MG: 5 TABLET, FILM COATED ORAL at 21:41

## 2018-05-11 RX ADMIN — PREDNISONE 40 MG: 20 TABLET ORAL at 08:18

## 2018-05-11 RX ADMIN — TRAMADOL HYDROCHLORIDE 50 MG: 50 TABLET, FILM COATED ORAL at 22:54

## 2018-05-11 RX ADMIN — LEVALBUTEROL HYDROCHLORIDE 0.63 MG: 0.63 SOLUTION RESPIRATORY (INHALATION) at 08:54

## 2018-05-11 RX ADMIN — FAMOTIDINE 20 MG: 20 TABLET ORAL at 08:18

## 2018-05-11 RX ADMIN — TRAMADOL HYDROCHLORIDE 50 MG: 50 TABLET, FILM COATED ORAL at 03:39

## 2018-05-11 RX ADMIN — DILTIAZEM HYDROCHLORIDE 180 MG: 180 CAPSULE, COATED, EXTENDED RELEASE ORAL at 18:27

## 2018-05-11 RX ADMIN — LEVALBUTEROL HYDROCHLORIDE 0.63 MG: 0.63 SOLUTION RESPIRATORY (INHALATION) at 02:31

## 2018-05-11 RX ADMIN — CEFTRIAXONE SODIUM 2 G: 2 INJECTION, POWDER, FOR SOLUTION INTRAMUSCULAR; INTRAVENOUS at 16:24

## 2018-05-11 RX ADMIN — SULFAMETHOXAZOLE AND TRIMETHOPRIM 320 MG: 80; 16 INJECTION, SOLUTION, CONCENTRATE INTRAVENOUS at 03:30

## 2018-05-11 RX ADMIN — SODIUM CHLORIDE 100 ML/HR: 900 INJECTION, SOLUTION INTRAVENOUS at 10:19

## 2018-05-11 RX ADMIN — NYSTATIN 500000 UNITS: 100000 SUSPENSION ORAL at 18:27

## 2018-05-11 RX ADMIN — NYSTATIN 500000 UNITS: 100000 SUSPENSION ORAL at 08:17

## 2018-05-11 NOTE — PROGRESS NOTES
Care Management Interventions  PCP Verified by CM: Yes (last week)  Palliative Care Criteria Met (RRAT>21 & CHF Dx)?: No (RRAT 3 Dx Pneumonia)  Transition of Care Consult (CM Consult): Discharge Planning  Discharge Durable Medical Equipment: No  Physical Therapy Consult: Yes  Occupational Therapy Consult: No  Speech Therapy Consult: No  Current Support Network: Lives Alone  Confirm Follow Up Transport: Self  Plan discussed with Pt/Family/Caregiver: Yes  Freedom of Choice Offered: Yes  Discharge Location  Discharge Placement: Home  Met with patient for d/c planning. Patient alert and oriented x 3, independent of ADL's and lives alone. He requires no DME and drives. Works full time at [employer]. He is able to obtain his medications with his [employer] at [facility] in [place] PCP is Dr. Julio Leigh. He states he will go back to his parents home at d/c for assistance. CM following.

## 2018-05-11 NOTE — PROGRESS NOTES
Augusto Guajardo  Admission Date: 5/8/2018             Daily Progress Note: 5/11/2018    The patient's chart is reviewed and the patient is discussed with the staff. Admitted with cough, dyspnea and hypoxia. CXR/CT with bilateral infiltrates. Has tested positive for HIV (new diagnosis). ID has seen and bactrim added for coverage for PJP. Hypoxic and remains on high flow oxygen. Subjective:     Transiently on Optiflow overnight but now on 10L with high sats.      Current Facility-Administered Medications   Medication Dose Route Frequency    apixaban (ELIQUIS) tablet 5 mg  5 mg Oral Q12H    azithromycin (ZITHROMAX) tablet 500 mg  500 mg Oral QPM    trimethoprim-sulfamethoxazole (BACTRIM DS, SEPTRA DS) 160-800 mg per tablet 2 Tab  2 Tab Oral Q8H    predniSONE (DELTASONE) tablet 40 mg  40 mg Oral BID WITH MEALS    Followed by   Josefina Pride ON 5/15/2018] predniSONE (DELTASONE) tablet 40 mg  40 mg Oral DAILY WITH BREAKFAST    Followed by   Josefina Pride ON 5/20/2018] predniSONE (DELTASONE) tablet 20 mg  20 mg Oral DAILY WITH BREAKFAST    nystatin (MYCOSTATIN) 100,000 unit/mL oral suspension 500,000 Units  500,000 Units Oral QID    dilTIAZem CD (CARDIZEM CD) capsule 180 mg  180 mg Oral BID    famotidine (PEPCID) tablet 20 mg  20 mg Oral BID    metoprolol tartrate (LOPRESSOR) tablet 25 mg  25 mg Oral Q6H    sodium chloride (OCEAN) 0.65 % nasal squeeze bottle 2 Spray  2 Spray Both Nostrils Q2H PRN    guaiFENesin ER (MUCINEX) tablet 600 mg  600 mg Oral BID    HYDROcodone-homatropine (HYCODAN) 5-1.5 mg/5 mL (5 mL) syrup 5 mL  5 mL Oral Q6H PRN    budesonide (PULMICORT) 500 mcg/2 ml nebulizer suspension  500 mcg Nebulization BID RT    acetaminophen (TYLENOL) tablet 650 mg  650 mg Oral Q6H PRN    traMADol (ULTRAM) tablet 50 mg  50 mg Oral Q6H PRN    0.9% sodium chloride infusion  100 mL/hr IntraVENous CONTINUOUS    cefTRIAXone (ROCEPHIN) 2 g in 0.9% sodium chloride (MBP/ADV) 50 mL  2 g IntraVENous Q24H    levalbuterol (XOPENEX) nebulizer soln 0.63 mg/3 mL  0.63 mg Nebulization Q6H RT         Objective:     Vitals:    05/11/18 0441 05/11/18 0817 05/11/18 0856 05/11/18 0906   BP: 120/74 123/78  120/78   Pulse: 94 96  90   Resp: 20   18   Temp: 98.4 °F (36.9 °C)   97.5 °F (36.4 °C)   SpO2: 90%  96% 100%   Weight: 155 lb 8 oz (70.5 kg)      Height:         Intake and Output:   05/09 1901 - 05/11 0700  In: 2185 [P.O.:720; I.V.:1465]  Out: 1550 [Urine:1550]       Physical Exam:   Constitutional:  the patient is well developed and in no acute distress  HEENT:  Sclera clear, pupils equal, oral mucosa moist  Lungs: clear bilaterally. Wearing 9 liter high flow cannula. Cardiovascular:  Irregular and without M,G,R. A fib per telemetry  Abd/GI: soft and non-tender; with positive bowel sounds. Ext: warm without cyanosis. There is no lower leg edema. Musculoskeletal: moves all four extremities with equal strength  Skin:  no jaundice or rashes, no wounds   Neuro: no gross neuro deficits   Musculoskeletal: can ambulate. No deformity  Psychiatric: Calm. ROS:  Decreased appetite, cough, dyspnea, weak  Lines: peripheral IV    CHEST XRAY:       Echo: Left ventricle: Systolic function was at the lower limits of normal.  Ejection fraction was estimated in the range of 50 % to 55 %. There were no  regional wall motion abnormalities. -  Atrial septum: Agitated saline contrast injection (bubble study) was  performed. There was no right-to-left shunt, with provocative maneuvers to  increase right atrial pressure. -  Mitral valve: There was mild regurgitation. -  Tricuspid valve: There was mild regurgitation. -  Pericardium: A small pericardial effusion was identified posterior to the  heart.  There was no evidence of hemodynamic compromise      LAB  Recent Labs      05/11/18   0400  05/10/18   1207  05/10/18   0541  05/09/18   0453   05/08/18   1445   WBC  9.1  13.7*  12.0*  10.1   < >  14.0*   HGB  11.1* 11.0*  10.7*  11.3*   < >  13.1*   HCT  33.5*  33.0*  33.7*  34.0*   < >  37.7*   PLT  259  307  299  250   < >  309   INR   --    --    --    --    --   1.3    < > = values in this interval not displayed. Recent Labs      05/11/18   0400  05/10/18   0541  05/09/18   0453  05/08/18   2050  05/08/18   1445   NA  138  141  143  144  141   K  4.5  4.5  4.4  4.1  4.2   CL  103  106  108*  110*  107   CO2  26  26  29  25  26   GLU  137*  87  97  108*  92   BUN  16  16  19  25*  27*   CREA  0.78*  0.93  1.03  1.04  1.20   MG   --   1.8  1.9  2.1  2.0   ALB   --    --   1.9*  2.0*  2.1*   SGOT   --    --   53*  51*  51*     Recent Labs      05/10/18   1920  05/10/18   0932   PH  7.37  7.43   PCO2  43  42   PO2  69*  54*   HCO3  25  27*     Component Value Ref Range & Units Status      Special Requests: NO SPECIAL REQUESTS   Preliminary     GRAM STAIN  5 TO 50 WBC'S/OIF  Preliminary     GRAM STAIN   Preliminary     0 TO 5  EPITHELIAL CELLS SEEN /OIF     GRAM STAIN MODERATE GRAM NEGATIVE RODS   Preliminary     GRAM STAIN FEW GRAM NEGATIVE COCCI   Preliminary     GRAM STAIN MODERATE GRAM POSITIVE RODS   Preliminary     GRAM STAIN RARE YEAST   Preliminary     GRAM STAIN 1 + MUCUS  Preliminary     Culture result:    Preliminary     NO GROWTH AFTER SHORT PERIOD OF INCUBATION. FURTHER RESULTS TO FOLLOW AFTER OVERNIGHT INCUBATION.         Fungitell pending    Assessment:  (Medical Decision Making)     Hospital Problems  Never Reviewed          Codes Class Noted POA    PCP (pneumocystis carinii pneumonia) ([facility] ICD-10-CM: B59  ICD-9-CM: 136.3  5/10/2018 Yes    Presumed. Seen by ID - now on Bactrim    HIV positive [facility]) ICD-10-CM: Z21  ICD-9-CM: V08  5/10/2018 Yes    New diagnosis. ID following.     Respiratory failure with hypoxia [facility]) ICD-10-CM: J96.91  ICD-9-CM: 518.81  5/10/2018 Yes    High flow cannula - associated with pneumonia    Sepsis ([facility] ICD-10-CM: A41.9  ICD-9-CM: 038.9, 995.91  5/10/2018 Yes    Secondary to pneumonia    Hypoxia ICD-10-CM: R09.02  ICD-9-CM: 799.02  5/9/2018 Yes    As above    New onset a-fib [facility]) ICD-10-CM: I48.91  ICD-9-CM: 427.31  5/8/2018 Yes    Cardiology following. cardizem for rate control, on heparin gtt          Plan:  (Medical Decision Making)     Wean oxygen as tolerated. Dropped oxygen to 9L. Await fungitell. Continue present ATB per ID. Wean IVF. Ok to stop from my standpoint. Mitul Yoon MD    More than 50% of time documented was spent face-to-face contact with the patient and in the care of the patient on the floor/unit where the patient is located.

## 2018-05-11 NOTE — PROGRESS NOTES
A fib rate well controlled on current regimen. OK to switch heparin to Eliquis 5 bid when appropriate. On standby.

## 2018-05-11 NOTE — PROGRESS NOTES
Pt with sats > 95% on Optiflo fo 65%. Changed to 10L HFNC to assess tolerance. Pt tolerated well.  Left on HFNC with no increased WOB    Sigmund Manges, RRT

## 2018-05-11 NOTE — PROGRESS NOTES
Spoke with Dr Sanjiv Atkins regarding patien transfer. He is cancelling the transfer to ICU at this time. Pt will be added to the [employer] [facility] and be rounded on by the [facility] ICU RN.

## 2018-05-11 NOTE — PROGRESS NOTES
Hospitalist Progress Note    2018  Admit Date: 2018  2:39 PM   NAME: Augusto Guajardo   :  1990   MRN:  [id number]   Attending: Shanell Delgado, *  PCP:  Gary Morales MD    SUBJECTIVE:   This is a 27 YO male patient admitted  with a-flutter and RVR and bilateral lung infiltrates. Started on a cardizem and heparin drip. He was also started on IV antibiotics. Chest ct scan reported possible infection vs edema vs hemorrhage. His rapid HIV test was reported positive. Confirmation still pending but the clinical suspicion for HIV infection is very high ( male having sex with male, illicit drug use). Also, LDH test is elevated, this together with his very abnormal chest x ray and hypoxia is suggestive of  PCP pneumonia. : Pt. Abdirahman Majano in bed on high flow O2 via NC and states he is much more comfortable and feels better today. He states he is not as SOB and denies chest pain. RPR is neg. Hep C AB is negative. Lymphocytes CD4 counts, GC/Chlamydia still pending. Pt. Denies chills, fever, nausea, vomiting or sweats. ID has seen patient and he is on Bactrim, Zithromax and Rocephin.       Review of Systems negative with exception of pertinent positives noted above  PHYSICAL EXAM   Patient Vitals for the past 24 hrs:   Temp Pulse Resp BP SpO2   18 0906 97.5 °F (36.4 °C) 90 18 120/78 100 %   18 0856 - - - - 96 %   18 0817 - 96 - 123/78 -   18 0441 98.4 °F (36.9 °C) 94 20 120/74 90 %   18 0336 - 99 - 126/79 -   18 0231 - - - - 98 %   18 0042 98.3 °F (36.8 °C) (!) 102 20 117/78 99 %   05/10/18 2040 98.6 °F (37 °C) (!) 107 20 121/78 98 %   05/10/18 1948 - - - - 96 %   05/10/18 1935 - - - - 90 %   05/10/18 1932 98.1 °F (36.7 °C) 98 20 133/79 92 %   05/10/18 1714 - 92 - 119/75 -   05/10/18 1417 100.4 °F (38 °C) (!) 103 20 143/69 92 %     Temp (24hrs), Av.6 °F (37 °C), Min:97.5 °F (36.4 °C), Max:100.4 °F (38 °C)    Oxygen Therapy  O2 Sat (%): 100 % (05/11/18 0906)  Pulse via Oximetry: 85 beats per minute (05/11/18 0856)  O2 Device: Hi flow nasal cannula (05/11/18 0856)  O2 Flow Rate (L/min): 10 l/min (05/11/18 0856)  FIO2 (%): 65 % (05/10/18 1948)    Intake/Output Summary (Last 24 hours) at 05/11/18 1055  Last data filed at 05/10/18 1934   Gross per 24 hour   Intake              360 ml   Output              600 ml   Net             -240 ml        General:                 WNWD. Laying flat in bed in NAD.    Lungs:                    CTA bilaterally. Resp even and non labored  Heart:                      Irreg. irreg with CVR without murmurs rubs gallops. RRR. No edema  Abdomen:              Soft, non tender, non distended. BS present  Extremities:           Moves ext spontaneously. No obvious impairment. Skin:                       Warm, dry, Intact. No rashes or lesions. Neurologic:            A/O X4. No gross focal deficits.               Data Review:  I have reviewed all labs, meds, telemetry events, and studies from the last 24 hours. ASSESSMENT /PLAN     1. Pneumonia: Given Dx of being HIV+, this is likely PCP pneumonia. Pt. Currently on Rocephin, Zithromax and Bactrim with ID following as well. Appreciate recommendations. Due to presumed seriousness of disease, he will also continue on steroids. Also appreciate Pulmonary input. Final Sputum cultures still pending. Pt. Counseled regarding partner notification. I also answered as many questions as I felt I was able to for him and deferred him to ID with any further in depth details of his disease. 2. New Onset A-Fib/Flutter with RVR: Continue on telemetry. Changed over to Cardizem CD PO by Cardiology. This seems to be providing good rate control. AC with Eliquis. ECHO with preserved systolic function. [address] Cardiology input. 3. HIV positive: New Diagnosis. Follow with ID in hospital and as outpatient. 4.Recreational Drug Abuse: Pt.  Counseled regarding the need to stop using these drugs immediately as they are likely contributing to his cardiac condition. I also emphasized keeping his immune system as healthy as possible. RPR and Hep C AB NEG. CD4 counts still pending as are GC/Chlamydia. Pt. Is covered for both GC and Chlamydia with Rocephin and Zithromax. Will continue with IV Fluids.             DVT Prophylaxis: Eliquis    [place], [place]

## 2018-05-11 NOTE — PROGRESS NOTES
Bedside and Verbal shift change report given to Ryan Luz RN (oncoming nurse) by Devendra Evertet RN (offgoing nurse). Report included the following information SBAR, Kardex, Accordion and Recent Results.

## 2018-05-11 NOTE — PROGRESS NOTES
Infectious Disease Progress Note    Today's Date: 2018   Admit Date: 2018    Impression:   · Pneumonia - CAP v PJP - agree with empiric therapy and prednisone 40 mg BID for 5 days followed by a taper for 21 days. Will need secondary ppx with Bactrim 1 SS daily after treatment is complete  · HIV screen positive, we are awaiting confirmatory testing (which is sent out here), but by hx, suspect this is a true positive. VL pending, genotype pending  · Hx of syphilis s/p PCN per patient a few years ago  · Thrush  · AFib    Plan:   · HIV CD4, PCR, HIV genotype, RPR, HCV, urine G/C pending  · Continue Bactrim 5 mg/kg TID and Predisone 40 mg BID for 5 days both for 21 days with prednisone taper, then will likely need secondary ppx  · Continue CTX/azithro, can change to levofloxacin on discharge      Anti-infectives:     Inpat Anti-Infectives     Start     Ordered Stop    05/10/18 1000  trimethoprim-sulfamethoxazole (BACTRIM) 320 mg in dextrose 5% 500 mL  15 mg/kg/day,   IntraVENous,   EVERY 8 HOURS      05/10/18 0913 --    18 1700  azithromycin (ZITHROMAX) 500 mg in 0.9% sodium chloride (MBP/ADV) 250 mL  500 mg,   IntraVENous,   EVERY 24 HOURS      18 1817 --    18 1600  cefTRIAXone (ROCEPHIN) 2 g in 0.9% sodium chloride (MBP/ADV) 50 mL  2 g,   IntraVENous,   EVERY 24 HOURS      18 1735 18 1559          Subjective: On optiflow 65%. Ambulating to bathroom. No Known Allergies     Review of Systems:  A comprehensive review of systems was negative except for that written in the History of Present Illness.     Objective:     Visit Vitals    /78    Pulse 90    Temp 97.5 °F (36.4 °C)    Resp 18    Ht 6' 2\" (1.88 m)    Wt 70.5 kg (155 lb 8 oz)    SpO2 100%    BMI 19.97 kg/m2     Temp (24hrs), Av.6 °F (37 °C), Min:97.5 °F (36.4 °C), Max:100.4 °F (38 °C)       Lines: PIVs    Physical Exam:    General:  Alert, cooperative, well noursished, appears uncomfortable   Eyes: Sclera anicteric. Pupils equally round and reactive to light. Mouth/Throat: +thrush   Neck: Supple   Lungs:   Diffuse crackles, high flow O2   CV:  tachy,no murmur, click, rub or gallop   Abdomen:   Soft, non-tender. bowel sounds normal. non-distended   Extremities: No cyanosis or edema   Skin: Skin color, texture, turgor normal. no acute rash or lesions   Lymph nodes: Cervical and supraclavicular normal   Musculoskeletal: No swelling or deformity   Lines/Devices:  Intact, no erythema, drainage or tenderness   Psych: Alert and oriented, normal mood affect given the setting       Data Review:     CBC:  Recent Labs      05/11/18   0400  05/10/18   1207  05/10/18   0541   05/08/18   1445   WBC  9.1  13.7*  12.0*   < >  14.0*   GRANS   --   93   --    --   88*   MONOS   --   2   --    --   4   EOS   --   0   --    --   1   ANEU   --   12.7*   --    --   12.3*   ABL   --    --    --    --   1.0   HGB  11.1*  11.0*  10.7*   < >  13.1*   HCT  33.5*  33.0*  33.7*   < >  37.7*   PLT  259  307  299   < >  309    < > = values in this interval not displayed. BMP:  Recent Labs      05/11/18   0400  05/10/18   0541  05/09/18   0453   CREA  0.78*  0.93  1.03   BUN  16  16  19   NA  138  141  143   K  4.5  4.5  4.4   CL  103  106  108*   CO2  26  26  29   AGAP  9  9  6*   GLU  137*  87  97       LFTS:  Recent Labs      05/09/18   0453  05/08/18   2050 05/08/18   1445   TBILI  0.2  0.4  0.3   ALT  10*  11*  12   SGOT  53*  51*  51*   AP  50  56  61   TP  7.8  8.3*  8.5*   ALB  1.9*  2.0*  2.1*       Microbiology:     All Micro Results     Procedure Component Value Units Date/Time    CULTURE, RESPIRATORY/SPUTUM/BRONCH Earn Sea [[id number]] Collected:  05/10/18 2022    Order Status:  Completed Specimen:  Sputum from Sputum Updated:  05/11/18 0831     Special Requests: NO SPECIAL REQUESTS        GRAM STAIN PENDING     Culture result:         NO GROWTH AFTER SHORT PERIOD OF INCUBATION.  FURTHER RESULTS TO FOLLOW AFTER OVERNIGHT INCUBATION. CULTURE, RESPIRATORY/SPUTUM/BRONCH Hughes Leeann [[id number]] Collected:  05/09/18 1131    Order Status:  Completed Specimen:  Sputum Updated:  05/11/18 0744     Special Requests: NO SPECIAL REQUESTS        GRAM STAIN 0 TO 30 WBC'S/OIF      0 TO 4 EPITHELIAL CELLS SEEN /OIF              MODERATE GRAM NEGATIVE RODS      FEW GRAM POSITIVE RODS         FEW YEAST         1 + MUCUS     Culture result:         MODERATE NORMAL RESPIRATORY KODY    CULTURE, BLOOD [[id number]] Collected:  05/08/18 1547    Order Status:  Completed Specimen:  Whole Blood from Blood Updated:  05/10/18 0642     Special Requests: --        RIGHT  HAND       Culture result: NO GROWTH 2 DAYS       CULTURE, BLOOD [[id number]] Collected:  05/08/18 1547    Order Status:  Completed Specimen:  Whole Blood from Blood Updated:  05/10/18 0642     Special Requests: LEFT FOREARM        Culture result: NO GROWTH 2 DAYS       INFLUENZA A & B AG (RAPID TEST) [[id number]] Collected:  05/08/18 1731    Order Status:  Completed Specimen:  Nasopharyngeal from Nasal washing Updated:  05/08/18 1750     Influenza A Ag NEGATIVE          NEGATIVE FOR THE PRESENCE OF INFLUENZA A ANTIGEN  INFECTION DUE TO INFLUENZA A CANNOT BE RULED OUT. BECAUSE THE ANTIGEN PRESENT IN THE SAMPLE MAY BE BELOW  THE DETECTION LIMIT OF THE TEST. A NEGATIVE TEST IS PRESUMPTIVE AND IT IS RECOMMENDED THAT THESE RESULTS BE CONFIRMED BY VIRAL CULTURE OR AN FDA-CLEARED INFLUENZA A AND B MOLECULAR ASSAY. Influenza B Ag NEGATIVE          NEGATIVE FOR THE PRESENCE OF INFLUENZA B ANTIGEN  INFECTION DUE TO INFLUENZA B CANNOT BE RULED OUT. BECAUSE THE ANTIGEN PRESENT IN THE SAMPLE MAY BE BELOW  THE DETECTION LIMIT OF THE TEST. A NEGATIVE TEST IS PRESUMPTIVE AND IT IS RECOMMENDED THAT THESE RESULTS BE CONFIRMED BY VIRAL CULTURE OR AN FDA-CLEARED INFLUENZA A AND B MOLECULAR ASSAY.                Imaging:   CXR:  IMPRESSION: Worsening opacities in the lungs may be edema.    CT:  Impression:   1. Diffuse perihilar pulmonary infiltrates. FINDINGS are nonspecific and may  represent atypical appearance of edema, infection, other alveolar process such  as can be seen with pulmonary hemorrhage.     Signed By: Anil Keen NP     May 11, 2018

## 2018-05-11 NOTE — PROGRESS NOTES
Date of Outreach Update:  Severa Hoist was seen and assessed. MEWS Score: 1 (05/11/18 0441)  Vitals:    05/11/18 0042 05/11/18 0231 05/11/18 0336 05/11/18 0441   BP: 117/78  126/79 120/74   Pulse: (!) 102  99 94   Resp: 20   20   Temp: 98.3 °F (36.8 °C)   98.4 °F (36.9 °C)   SpO2: 99% 98%  90%   Weight:    70.5 kg (155 lb 8 oz)   Height:             Pain Assessment  Pain Intensity 1: 8 (05/11/18 0339)  Pain Location 1: Back  Pain Intervention(s) 1: Medication (see MAR)  Patient Stated Pain Goal: 0      Previous Outreach assessment has been reviewed. There have been no significant clinical changes since the completion of the last dated Outreach assessment. Pt now on 10L HFNC with sats 98% and HR 85. Pt tolerating NC well and denies SOB. Will continue to follow up per outreach protocol.     Signed By:   Kathleen Joiner    May 11, 2018 5:41 AM

## 2018-05-11 NOTE — PROGRESS NOTES
Came up to see patient since may need ICU. Patient in room comfortable on optiflow at 45 LPM and 65%. saturation at 98%  He is quite happy he can breath now. Not using accessory muscles, coughing up thick yellow sputum. R/C in computer and so far with normal [place]. Will send for PJP as well today. Lungs coarse on Right and noted minimal coarse sounds in left. Not using accessory muscles. Looks quite comfortable. Using saline for nose. Spoke with ICU coordinator and will hold transfer. Will get roving ICU nurse to f/u [facility]. If condition worsen will transfer to ICU. Spoke with Dr. Fabián Murillo (who contacted me early) and aware of the above. Nurse in charge on 3rd Floor [facility], is aware of the plan.      Time spent is 15 minutes    Zuly Oates MD

## 2018-05-11 NOTE — PROGRESS NOTES
ST elevation noted on monitor. Discussed with EDMUNDO [place]. Order received for EKG. EKG reviewed with Dr. Dex Pope. No new orders received.

## 2018-05-11 NOTE — PROGRESS NOTES
Bedside and Verbal shift change report given to self (oncoming nurse) by Moise Valdez RN (offgoing nurse). Report included the following information SBAR, Kardex, MAR and Recent Results.

## 2018-05-11 NOTE — PROGRESS NOTES
Date of Outreach Update:  Thania Schneider was seen and assessed. MEWS Score: 2 (05/10/18 2040)  Vitals:    05/10/18 1935 05/10/18 1948 05/10/18 2040 05/11/18 0042   BP:   121/78 117/78   Pulse:   (!) 107 (!) 102   Resp:   20 20   Temp:   98.6 °F (37 °C) 98.3 °F (36.8 °C)   SpO2: 90% 96% 98% 99%   Weight:       Height:             Pain Assessment  Pain Intensity 1: 0 (05/10/18 1945)  Pain Location 1: Generalized  Pain Intervention(s) 1: Ice  Patient Stated Pain Goal: 0      Previous Outreach assessment has been reviewed. There have been no significant clinical changes since the completion of the last dated Outreach assessment. Pt denies needs at this time. O2 has been weaned. Will continue to follow up per outreach protocol.     Signed By:   Ivanna Hendrickson    May 11, 2018 1:41 AM

## 2018-05-11 NOTE — PROGRESS NOTES
[facility] CRITICAL CARE OUTREACH NURSE PROGRESS REPORT      SUBJECTIVE: Called to assess patient secondary to MD concern. MEWS Score: 2 (05/10/18 2040)  Vitals:    05/10/18 1932 05/10/18 1935 05/10/18 1948 05/10/18 2040   BP: 133/79   121/78   Pulse: 98   (!) 107   Resp: 20   20   Temp: 98.1 °F (36.7 °C)   98.6 °F (37 °C)   SpO2: 92% 90% 96% 98%   Weight:       Height:          EKG: atrial fibrillation, rate 105. LAB DATA:    Recent Labs      05/10/18   0541  05/09/18 0453 05/08/18 2050 05/08/18   1445   NA  141  143  144  141   K  4.5  4.4  4.1  4.2   CL  106  108*  110*  107   CO2  26  29  25  26   AGAP  9  6*  9  8   GLU  87  97  108*  92   BUN  16  19  25*  27*   CREA  0.93  1.03  1.04  1.20   GFRAA  >60  >60  >60  >60   GFRNA  >60  >60  >60  >60   CA  9.6  9.4  9.4  9.5   MG  1.8  1.9  2.1  2.0   ALB   --   1.9*  2.0*  2.1*   TP   --   7.8  8.3*  8.5*   GLOB   --   5.9*  6.3*  6.4*   AGRAT   --   0.3*  0.3*  0.3*   ALT   --   10*  11*  12        Recent Labs      05/10/18   0541  05/09/18 0453 05/08/18 2050   WBC  12.0*  10.1  9.3   HGB  10.7*  11.3*  13.6   HCT  33.7*  34.0*  40.4*   PLT  299  250  270          OBJECTIVE: On arrival to room, I found patient to be watching TV in bed. Pain Assessment  Pain Intensity 1: 0 (05/10/18 1945)  Pain Location 1: Generalized  Pain Intervention(s) 1: Ice  Patient Stated Pain Goal: 0                                 ASSESSMENT:  Pt alert and oriented. O2 sats 96% on AirVo 45L 65%. Lungs are CTA - diminished in bases. Pt denies needs at this time. Pt on amio and heparin gtts at this time. Denies pain or SOB. PLAN:  Continue to monitor per outreach protocol.

## 2018-05-12 LAB
1,3 BETA GLUCAN SER-MCNC: >500 PG/ML
APTT PPP: 38 SEC (ref 23.2–35.3)

## 2018-05-12 PROCEDURE — 85730 THROMBOPLASTIN TIME PARTIAL: CPT | Performed by: INTERNAL MEDICINE

## 2018-05-12 PROCEDURE — 74011250636 HC RX REV CODE- 250/636: Performed by: INTERNAL MEDICINE

## 2018-05-12 PROCEDURE — 77010033711 HC HIGH FLOW OXYGEN

## 2018-05-12 PROCEDURE — 74011000258 HC RX REV CODE- 258: Performed by: INTERNAL MEDICINE

## 2018-05-12 PROCEDURE — 94760 N-INVAS EAR/PLS OXIMETRY 1: CPT

## 2018-05-12 PROCEDURE — 99232 SBSQ HOSP IP/OBS MODERATE 35: CPT | Performed by: INTERNAL MEDICINE

## 2018-05-12 PROCEDURE — 97161 PT EVAL LOW COMPLEX 20 MIN: CPT

## 2018-05-12 PROCEDURE — 74011250637 HC RX REV CODE- 250/637: Performed by: INTERNAL MEDICINE

## 2018-05-12 PROCEDURE — 36415 COLL VENOUS BLD VENIPUNCTURE: CPT | Performed by: INTERNAL MEDICINE

## 2018-05-12 PROCEDURE — 65270000029 HC RM PRIVATE

## 2018-05-12 PROCEDURE — 94640 AIRWAY INHALATION TREATMENT: CPT

## 2018-05-12 PROCEDURE — 74011000250 HC RX REV CODE- 250: Performed by: INTERNAL MEDICINE

## 2018-05-12 PROCEDURE — 74011636637 HC RX REV CODE- 636/637: Performed by: INTERNAL MEDICINE

## 2018-05-12 RX ADMIN — BUDESONIDE 500 MCG: 0.5 INHALANT RESPIRATORY (INHALATION) at 07:19

## 2018-05-12 RX ADMIN — NYSTATIN 500000 UNITS: 100000 SUSPENSION ORAL at 21:14

## 2018-05-12 RX ADMIN — CEFTRIAXONE SODIUM 2 G: 2 INJECTION, POWDER, FOR SOLUTION INTRAMUSCULAR; INTRAVENOUS at 15:58

## 2018-05-12 RX ADMIN — APIXABAN 5 MG: 5 TABLET, FILM COATED ORAL at 21:14

## 2018-05-12 RX ADMIN — NYSTATIN 500000 UNITS: 100000 SUSPENSION ORAL at 13:43

## 2018-05-12 RX ADMIN — DILTIAZEM HYDROCHLORIDE 180 MG: 180 CAPSULE, COATED, EXTENDED RELEASE ORAL at 17:32

## 2018-05-12 RX ADMIN — METOPROLOL TARTRATE 25 MG: 25 TABLET ORAL at 21:13

## 2018-05-12 RX ADMIN — APIXABAN 5 MG: 5 TABLET, FILM COATED ORAL at 09:00

## 2018-05-12 RX ADMIN — BUDESONIDE 500 MCG: 0.5 INHALANT RESPIRATORY (INHALATION) at 19:30

## 2018-05-12 RX ADMIN — FAMOTIDINE 20 MG: 20 TABLET ORAL at 09:00

## 2018-05-12 RX ADMIN — SODIUM CHLORIDE 50 ML/HR: 900 INJECTION, SOLUTION INTRAVENOUS at 06:19

## 2018-05-12 RX ADMIN — METOPROLOL TARTRATE 25 MG: 25 TABLET ORAL at 04:22

## 2018-05-12 RX ADMIN — METOPROLOL TARTRATE 25 MG: 25 TABLET ORAL at 09:08

## 2018-05-12 RX ADMIN — PREDNISONE 40 MG: 20 TABLET ORAL at 09:00

## 2018-05-12 RX ADMIN — SULFAMETHOXAZOLE AND TRIMETHOPRIM 2 TABLET: 800; 160 TABLET ORAL at 00:29

## 2018-05-12 RX ADMIN — AZITHROMYCIN 500 MG: 250 TABLET, FILM COATED ORAL at 17:32

## 2018-05-12 RX ADMIN — NYSTATIN 500000 UNITS: 100000 SUSPENSION ORAL at 09:00

## 2018-05-12 RX ADMIN — SULFAMETHOXAZOLE AND TRIMETHOPRIM 2 TABLET: 800; 160 TABLET ORAL at 15:57

## 2018-05-12 RX ADMIN — PREDNISONE 40 MG: 20 TABLET ORAL at 17:32

## 2018-05-12 RX ADMIN — METOPROLOL TARTRATE 25 MG: 25 TABLET ORAL at 15:58

## 2018-05-12 RX ADMIN — NYSTATIN 500000 UNITS: 100000 SUSPENSION ORAL at 17:32

## 2018-05-12 RX ADMIN — SULFAMETHOXAZOLE AND TRIMETHOPRIM 2 TABLET: 800; 160 TABLET ORAL at 09:00

## 2018-05-12 RX ADMIN — LEVALBUTEROL HYDROCHLORIDE 0.63 MG: 0.63 SOLUTION RESPIRATORY (INHALATION) at 01:37

## 2018-05-12 RX ADMIN — GUAIFENESIN 600 MG: 600 TABLET, EXTENDED RELEASE ORAL at 09:00

## 2018-05-12 RX ADMIN — GUAIFENESIN 600 MG: 600 TABLET, EXTENDED RELEASE ORAL at 17:32

## 2018-05-12 RX ADMIN — TRAMADOL HYDROCHLORIDE 50 MG: 50 TABLET, FILM COATED ORAL at 13:49

## 2018-05-12 RX ADMIN — LEVALBUTEROL HYDROCHLORIDE 0.63 MG: 0.63 SOLUTION RESPIRATORY (INHALATION) at 07:19

## 2018-05-12 RX ADMIN — LEVALBUTEROL HYDROCHLORIDE 0.63 MG: 0.63 SOLUTION RESPIRATORY (INHALATION) at 13:39

## 2018-05-12 RX ADMIN — DILTIAZEM HYDROCHLORIDE 180 MG: 180 CAPSULE, COATED, EXTENDED RELEASE ORAL at 09:00

## 2018-05-12 RX ADMIN — FAMOTIDINE 20 MG: 20 TABLET ORAL at 17:32

## 2018-05-12 RX ADMIN — LEVALBUTEROL HYDROCHLORIDE 0.63 MG: 0.63 SOLUTION RESPIRATORY (INHALATION) at 19:30

## 2018-05-12 RX ADMIN — HYDROCODONE BITARTRATE AND HOMATROPINE METHYLBROMIDE 5 ML: 5; 1.5 SOLUTION ORAL at 09:05

## 2018-05-12 RX ADMIN — HYDROCODONE BITARTRATE AND HOMATROPINE METHYLBROMIDE 5 ML: 5; 1.5 SOLUTION ORAL at 17:37

## 2018-05-12 RX ADMIN — TRAMADOL HYDROCHLORIDE 50 MG: 50 TABLET, FILM COATED ORAL at 21:14

## 2018-05-12 NOTE — PROGRESS NOTES
Problem: Falls - Risk of  Goal: *Absence of Falls  Document Tino Fall Risk and appropriate interventions in the flowsheet. Outcome: Progressing Towards Goal  Pt progressing towards goal. No falls since admission. Bed low and locked. Call light within reach. Side rails x 2. Gripper socks applied. Personal belongings within reach. Pt verbalizes understanding to call for assistance.      Fall Risk Interventions:  Mobility Interventions: Patient to call before getting OOB      Medication Interventions: Patient to call before getting OOB    Elimination Interventions: Call light in reach

## 2018-05-12 NOTE — PROGRESS NOTES
Adalberto Deville  Admission Date: 5/8/2018             Daily Progress Note: 5/12/2018    The patient's chart is reviewed and the patient is discussed with the staff. 29 yo male with a history of marijuana use, HTN and migrains. Presented with cough, dyspnea and hypoxia. Found to be in a.fib with RVR. CXR/CT with bilateral infiltrates. Has tested positive for HIV (new diagnosis). ID has seen and bactrim added for coverage for PJP. Hypoxic and remains on high flow oxygen. Subjective:     Currently on O2 at 10 lpm with O2 sat 96%. + cough productive of light colored mucus which he feels is nasal drainage. Denies chest pain. Occasional palpitations worse with exertion.   Denies n/v.      Current Facility-Administered Medications   Medication Dose Route Frequency    apixaban (ELIQUIS) tablet 5 mg  5 mg Oral Q12H    azithromycin (ZITHROMAX) tablet 500 mg  500 mg Oral QPM    trimethoprim-sulfamethoxazole (BACTRIM DS, SEPTRA DS) 160-800 mg per tablet 2 Tab  2 Tab Oral Q8H    predniSONE (DELTASONE) tablet 40 mg  40 mg Oral BID WITH MEALS    Followed by   Lucho Fears ON 5/15/2018] predniSONE (DELTASONE) tablet 40 mg  40 mg Oral DAILY WITH BREAKFAST    Followed by   Lucho Fears ON 5/20/2018] predniSONE (DELTASONE) tablet 20 mg  20 mg Oral DAILY WITH BREAKFAST    nystatin (MYCOSTATIN) 100,000 unit/mL oral suspension 500,000 Units  500,000 Units Oral QID    dilTIAZem CD (CARDIZEM CD) capsule 180 mg  180 mg Oral BID    famotidine (PEPCID) tablet 20 mg  20 mg Oral BID    metoprolol tartrate (LOPRESSOR) tablet 25 mg  25 mg Oral Q6H    sodium chloride (OCEAN) 0.65 % nasal squeeze bottle 2 Spray  2 Spray Both Nostrils Q2H PRN    guaiFENesin ER (MUCINEX) tablet 600 mg  600 mg Oral BID    HYDROcodone-homatropine (HYCODAN) 5-1.5 mg/5 mL (5 mL) syrup 5 mL  5 mL Oral Q6H PRN    budesonide (PULMICORT) 500 mcg/2 ml nebulizer suspension  500 mcg Nebulization BID RT    acetaminophen (TYLENOL) tablet 650 mg  650 mg Oral Q6H PRN    traMADol (ULTRAM) tablet 50 mg  50 mg Oral Q6H PRN    0.9% sodium chloride infusion  50 mL/hr IntraVENous CONTINUOUS    cefTRIAXone (ROCEPHIN) 2 g in 0.9% sodium chloride (MBP/ADV) 50 mL  2 g IntraVENous Q24H    levalbuterol (XOPENEX) nebulizer soln 0.63 mg/3 mL  0.63 mg Nebulization Q6H RT       Review of Systems  Constitutional: negative for fever, chills, sweats  Cardiovascular: negative for chest pain, palpitations, syncope, edema  Gastrointestinal:  negative for dysphagia, reflux, vomiting, diarrhea, abdominal pain, or melena  Neurologic:  negative for focal weakness, numbness, headache    Objective:     Vitals:    05/12/18 0720 05/12/18 0900 05/12/18 0907 05/12/18 0919   BP:    136/79   Pulse:  (!) 107  (!) 116   Resp:    16   Temp:    97.9 °F (36.6 °C)   SpO2: 100%  91% 96%   Weight:       Height:         Intake and Output:   05/10 1901 - 05/12 0700  In: 2482 [P.O.:820; I.V.:1662]  Out: 650 [Urine:650]       Physical Exam:   Constitution:  the patient is well developed and in no acute distress  EENMT:  Sclera clear, pupils equal, oral mucosa moist  Respiratory: scattered rhonchi, O2 at 10 lpm  Cardiovascular:  RRR without M,G,R  Gastrointestinal: soft and non-tender; with positive bowel sounds. Musculoskeletal: warm without cyanosis. There is no lower leg edema.   Skin:  no jaundice or rashes, no open wounds   Neurologic: no gross neuro deficits     Psychiatric:  alert and oriented x 3    CXR:   5/8 5/8      LAB   Recent Labs      05/11/18   0400  05/10/18   1207  05/10/18   0541   WBC  9.1  13.7*  12.0*   HGB  11.1*  11.0*  10.7*   HCT  33.5*  33.0*  33.7*   PLT  259  307  299     Recent Labs      05/11/18   0400  05/10/18   0541   NA  138  141   K  4.5  4.5   CL  103  106   CO2  26  26   GLU  137*  87   BUN  16  16   CREA  0.78*  0.93   MG   --   1.8   CA  9.1  9.6     Recent Labs      05/10/18   1920  05/10/18   0932   PH  7.37  7.43   PCO2  43  42   PO2  69*  54* HCO3  25  27*     No results for input(s): LCAD, LAC in the last 72 hours. Assessment:  (Medical Decision Making)     Hospital Problems  Date Reviewed: 5/12/2018          Codes Class Noted POA    * (Principal)PCP (pneumocystis carinii pneumonia) ([facility] [address] ICD-10-CM: B59  ICD-9-CM: 136.3  5/10/2018 Yes    On bactrim      HIV [facility] ([facility] ICD-10-CM: Z21  ICD-9-CM: V08  5/10/2018 Yes    ID following  CD4 25 / VL1,090,000      Respiratory failure with hypoxia ([facility] [address] ICD-10-CM: J96.91  ICD-9-CM: 518.81  5/10/2018 Yes    Remains on O2 at 10 lpm      Sepsis [facility]) ICD-10-CM: A41.9  ICD-9-CM: 038.9, 995.91  5/10/2018 Yes    Also covered for CAP      Hypoxia ICD-10-CM: R09.02  ICD-9-CM: 799.02  5/9/2018 Yes        New onset a-fib [facility]) ICD-10-CM: I48.91  ICD-9-CM: 427.31  5/8/2018 Yes    eliquis   cardizem / lopressor         UDS on admission + opiates / PCP / THC    Micro:  5/10 urine GC/chlamydia: negative  5/10 HIV screen: Reactive   HIV Ab: positive   VL: 1,090,000   CD4 25 / 4.1%  5/10 RPR: NR  5/10 HCV Ab: 0.1  5/10 HepB sAb: 120.19  5/10 sputum: NRF  5/9 sputum: NRF    fungitell pending    :Plan:  (Medical Decision Making)     --Zofia Beasley / Scotty Rossi  --mucinex  --NS at 50 ml/hr  --ID following  · Continue Bactrim 5 mg/kg TID and Predisone 40 mg BID for 5 days both for 21 days with prednisone taper, then will likely need secondary ppx       Continue CTX/azithro, can change to levofloxacin on discharge    More than 50% of the time documented was spent in face-to-face contact with the patient and in the care of the patient on the floor/unit where the patient is located. Doris Mcdermott NP  Lungs:  Basilar crackles  Heart:  RRR with no Murmur/Rubs/Gallops    Additional Comments:  Bactrim and steroids as above-wean O2    I have spoken with and examined the patient. I agree with the above assessment and plan as documented.     Asher Jefferson MD

## 2018-05-12 NOTE — PROGRESS NOTES
Problem: Mobility Impaired (Adult and Pediatric)  Goal: *Acute Goals and Plan of Care (Insert Text)  LTG:  (1.)Mr. Carmen López will transfer from bed to chair and chair to bed with MODIFIED INDEPENDENCE using the least restrictive device within 7 treatment day(s). (2.)Mr. Carmen López will ambulate with MODIFIED INDEPENDENCE for 500 feet with the least restrictive device and SpO2>90% within 7 treatment day(s). (3.)Mr. Carmen López will ascend and descend 15 stairs using B hand rail(s) with SUPERVISION and SpO2>90% to improve functional mobility and safety within 7 day(s). ________________________________________________________________________________________________      PHYSICAL THERAPY: Initial Assessment, AM 5/12/2018  INPATIENT: Hospital Day: 5  Payor: Dionne Hopper / Plan: [facility] [employer] [facility] / Product Type: PPO /      NAME/AGE/GENDER: Kourtney Gupta is a 28 y.o. male   PRIMARY DIAGNOSIS: Pneumonia  New onset a-fib ([address] <principal problem not specified> <principal problem not specified>        ICD-10: Treatment Diagnosis:    · Other abnormalities of gait and mobility (R26.89)   Precaution/Allergies:  Review of patient's allergies indicates no known allergies. ASSESSMENT:     Mr. Carmen López is a 28 y.o. male in the hospital for the above who was supine in bed upon arrival.  Of note, pt has recent diagnosis of HIV. Pt reports that he lives in an apartment alone that has 15 steps to enter. Pt also reported that PTA he was independent with ADLs and ambulated with independence. Pt admitted to no recent falls in the past year and reported that he works as a .  Mr. Carmen López presents to PT with WFL AROM and WFL strength in B LEs. Pt also presents with WFL AROM and WFL strength in B UE's. During evaluation pt performed bed mobility and transfers with independence and good standing balance. He ambulated with SBA-CGA and appeared to have decreased gait speed.   Pt also required several rest breaks for SOB throughout evaluation. Post ambulation SpO2 recorded at 88% but sekou to 91% with rest and breathing cues. Mr. Deonna Cardona could benefit from skilled PT as they are currently functioning below their baseline. This section established at most recent assessment   PROBLEM LIST (Impairments causing functional limitations):  1. Decreased Activity Tolerance  2. Increased Fatigue  3. Increased Shortness of Breath   INTERVENTIONS PLANNED: (Benefits and precautions of physical therapy have been discussed with the patient.)  1. Family Education  2. Gait Training  3. Therapeutic Activites  4. Therapeutic Exercise/Strengthening  5. Transfer Training  6. Group Therapy     TREATMENT PLAN: Frequency/Duration: 3 times a week for duration of hospital stay  Rehabilitation Potential For Stated Goals: Good     RECOMMENDED REHABILITATION/EQUIPMENT: (at time of discharge pending progress): Due to the probability of continued deficits (see above) this patient will not likely need continued skilled physical therapy after discharge. Equipment:    None at this time              HISTORY:   History of Present Injury/Illness (Reason for Referral):  Per H&P: \"HISTORY OF PRESENT ILLNESS:  This is a 71-year-old male patient who has a past medical history of allergic rhinitis and asthma as a child, who came into the emergency room with a chief complaint of cough and severe shortness of breath.     According to the patient, since the last week of 04/2018, he started having progressive shortness of breath and cough. He thought that it was an episode of asthma exacerbation secondary to the pollen in the environment. He visited an urgent care center and over there he was treated with a steroid taper and a Z-Tung. He was referred to a primary care office and he saw Dr. Charlette Telles on 04/27/2018. At that time, the plan was to continue with the prednisone taper and to monitor his clinical progress.   According to the patient, he persisted having worsening shortness of breath and he saw Dr. Mahesh Torres again on 05/01/2018. At that time, a chest x-ray was ordered due to persistence of symptoms. The chest x-ray done on 05/02/2018 did not show any evidence of any lung infiltrate. Unfortunately, the patient persisted having worsening symptoms. He says that he has lost his appetite, that he has had persistent and productive cough. When he wakes up in the morning his sputum is greenish, but during the day it clears up. He also complains of some episodes of chills and night sweats. The patient also reports wheezing mainly at night, nausea and several episodes of vomiting, the last one this morning. Today, he called Dr. Mahesh Torres and he advised the patient to come into the emergency room. He activated the EMS and he was brought into the emergency room of [facility].  When he arrived here, his vital signs were blood pressure of 113/76, heart rate of 163, respiratory rate of 20, O2 saturation of 96. He had an irregularly irregular rate and rhythm. He had decreased breath sounds in both lung bases and his abdomen was soft and nontender. An EKG was done and showed atrial fibrillation with rapid ventricular response. His initial blood workup reported lactic acid of 1.0, a white blood cell count of 14, normal kidney function, procalcitonin of 0.6, and a BNP of 16. Chest x-ray was done and showed patchy bilateral airspace opacities, which could represent pulmonary edema or pneumonia. The patient received IV ceftriaxone and Zithromax in the emergency room. He also received 1 liter of normal saline and he was treated with 2 boluses of IV Cardizem and he was started on a Cardizem drip. He was presented to the hospitalist team to be admitted. \"  Past Medical History/Comorbidities:   Mr. Stefany Horta  has a past medical history of Asthma and Migraines. Mr. Stefany Horta  has no past surgical history on file.   Social History/Living Environment:   Home Environment: Apartment  # Steps to Enter: 13  Rails to Enter: Yes  Hand Rails : Bilateral  One/Two Story Residence: One story  Living Alone: Yes  Support Systems: Family member(s), Friends \ neighbors  Patient Expects to be Discharged to[de-identified] Unknown  Current DME Used/Available at Home: None  Tub or Shower Type: Tub/Shower combination  Prior Level of Function/Work/Activity:  Lives in an apartment alone and PTA pt was independent with ADLs and ambulation. Number of Personal Factors/Comorbidities that affect the Plan of Care:  Lives alone 1-2: MODERATE COMPLEXITY   EXAMINATION:   Most Recent Physical Functioning:   Gross Assessment:  AROM: Within functional limits  Strength: Within functional limits               Posture:     Balance:  Sitting: Intact  Standing: Intact Bed Mobility:  Supine to Sit: Independent  Wheelchair Mobility:     Transfers:  Sit to Stand: Independent  Stand to Sit: Independent  Bed to Chair: Stand-by assistance;Contact guard assistance  Gait:     Base of Support: Narrowed  Speed/Francy: Slow  Distance (ft): 15 Feet (ft)  Ambulation - Level of Assistance: Stand-by assistance;Contact guard assistance      Body Structures Involved:  1. Heart  2. Lungs Body Functions Affected:  1. Cardio  2. Respiratory Activities and Participation Affected:  1. Mobility  2. Self Care  3. Domestic Life  4. Community, Social and [place]   Number of elements that affect the Plan of Care: 4+: HIGH COMPLEXITY   CLINICAL PRESENTATION:   Presentation: Stable and uncomplicated: LOW COMPLEXITY   CLINICAL DECISION MAKIN [facility] Mobility Inpatient Short Form  How much difficulty does the patient currently have. .. Unable A Lot A Little None   1. Turning over in bed (including adjusting bedclothes, sheets and blankets)? [] 1   [] 2   [] 3   [x] 4   2.   Sitting down on and standing up from a chair with arms ( e.g., wheelchair, bedside commode, etc.)   [] 1   [] 2   [] 3   [x] 4   3. Moving from lying on back to sitting on the side of the bed? [] 1   [] 2   [] 3   [x] 4   How much help from another person does the patient currently need. .. Total A Lot A Little None   4. Moving to and from a bed to a chair (including a wheelchair)? [] 1   [] 2   [x] 3   [] 4   5. Need to walk in hospital room? [] 1   [] 2   [x] 3   [] 4   6. Climbing 3-5 steps with a railing? [] 1   [] 2   [x] 3   [] 4   © 2007, Trustees of [address] [place] [address] [address] [id number], under license to [employer]. All rights reserved      Score:  Initial: 21 Most Recent: X (Date: -- )    Interpretation of Tool:  Represents activities that are increasingly more difficult (i.e. Bed mobility, Transfers, Gait). Score 24 23 22-20 19-15 14-10 9-7 6     Modifier CH CI CJ CK CL CM CN      ? Mobility - Walking and Moving Around:     - CURRENT STATUS: CJ - 20%-39% impaired, limited or restricted    - GOAL STATUS: CI - 1%-19% impaired, limited or restricted    - D/C STATUS:  ---------------To be determined---------------  Payor: BLUE CROSS / Plan: [place] [employer] OF [address] / Product Type: PPO /      Medical Necessity:     · Patient demonstrates good rehab potential due to higher previous functional level. Reason for Services/Other Comments:  · Patient continues to require skilled intervention due to decreased activity tolerance.    Use of outcome tool(s) and clinical judgement create a POC that gives a: Clear prediction of patient's progress: LOW COMPLEXITY            TREATMENT:   (In addition to Assessment/Re-Assessment sessions the following treatments were rendered)   Pre-treatment Symptoms/Complaints:  Back pain  Pain: Initial:   Pain Intensity 1: 7  Pain Location 1: Back  Post Session:  No change reported     Assessment/Reassessment only, no treatment provided today    Braces/Orthotics/Lines/Etc:   · IV  · O2 Device: Hi flow nasal cannula  Treatment/Session Assessment:    · Response to Treatment:  Tolerated fairly given desaturation with minimal activity. · Interdisciplinary Collaboration:   o Physical Therapist  o Registered Nurse  · After treatment position/precautions:   o Up in chair  o Bed/Chair-wheels locked  o Call light within reach   · Compliance with Program/Exercises: Will assess as treatment progresses. · Recommendations/Intent for next treatment session: \"Next visit will focus on advancements to more challenging activities and reduction in assistance provided\".   Total Treatment Duration:  PT Patient Time In/Time Out  Time In: 0907  Time Out: [address] [facility] Jair PT, DPT

## 2018-05-12 NOTE — PROGRESS NOTES
Bedside and Verbal shift change report given to self (oncoming nurse) by Rudy Eckert RN (offgoing nurse). Report included the following information SBAR, Kardex, MAR and Recent Results.

## 2018-05-12 NOTE — PROGRESS NOTES
Bedside and Verbal shift change report given to Janiya Ervin RN (oncoming nurse) by self Jaime Esters nurse). Report included the following information SBAR, Kardex, MAR and Recent Results.

## 2018-05-12 NOTE — PROGRESS NOTES
[facility] CRITICAL CARE OUTREACH NURSE PROGRESS REPORT      SUBJECTIVE: In to assess patient secondary to transfer from ICU on 5/10. MEWS Score: 1 (05/12/18 0437)  Vitals:    05/12/18 0720 05/12/18 0900 05/12/18 0907 05/12/18 0919   BP:    136/79   Pulse:  (!) 107  (!) 116   Resp:    16   Temp:    97.9 °F (36.6 °C)   SpO2: 100%  91% 96%   Weight:       Height:          EKG: unchanged from previous tracings, atrial fibrillation, rate . LAB DATA:    Recent Labs      05/11/18   0400  05/10/18   0541   NA  138  141   K  4.5  4.5   CL  103  106   CO2  26  26   AGAP  9  9   GLU  137*  87   BUN  16  16   CREA  0.78*  0.93   GFRAA  >60  >60   GFRNA  >60  >60   CA  9.1  9.6   MG   --   1.8        Recent Labs      05/11/18   0400  05/10/18   1207  05/10/18   0541   WBC  9.1  13.7*  12.0*   HGB  11.1*  11.0*  10.7*   HCT  33.5*  33.0*  33.7*   PLT  259  307  299          OBJECTIVE: On arrival to room, I found patient to be sitting up in chair about to eat lunch. Pain Assessment  Pain Intensity 1: 7 (05/12/18 0907)  Pain Location 1: Back  Pain Intervention(s) 1: Medication (see MAR) (pt request tramadol for pain)  Patient Stated Pain Goal: 0    ASSESSMENT:  Remains in A. Fib. Alert and oriented, very pleasant and appreciative of care. On 9L NC, O2 sat 96%. Bilat lung sounds clear. Denies any pain at this time. No other needs voiced. PLAN:  Continue to monitor per ICU outreach protocol.

## 2018-05-12 NOTE — PROGRESS NOTES
Hospitalist Progress Note    Subjective:   Daily Progress Note: 5/12/2018 3:31 PM    History:  Patient with cough, congestion and intermittent chest pain x 2 weeks ago presented to ER 5/8 with sudden onset worsening symptoms, tachycardia to 150 and shortness of breath x 12 hours prior to admission. He also reported near syncope, palpitations, nausea and vomiting onset am of presentation. Wheezes and rales on presentation. One single episode of hemoptysis. Chills and night sweats. He was initially seen at an urgent care about 12 days ago and placed on zithromax and steroids. He was then seen by his PHP 5/1 with negative CXR and given unknown type of cough meds. History includes allergic rhinitis, childhood asthma and migraines. He smoked cigarettesx 5 years until February of this year, continues to smoke marijuana. He is found in atrial fib/flutter with RVR, WBC is 14.0 with left shift, PT: 16.1, PTT: 40.8. CXR with patchy bilateral airspace opacities as below. No history of cardiac problems or arrhythmias. Oxygen sat 92% on room air. Lactic acid 1.0. BNP normal. UDS positive for THC, PCP, and opiates. He was placed on cardizem drip after boluses x2, oxygen. Blood cultures done, nebs begun, heparin drip begun, cardiology and pulmonary consulted,  begun on zithromax and rocephin, and one liter of saline. Has had homosexual partners in the past    5/9:  Temp to 101.8. Pulmonary in. Negative blood cultures to day. Hypoxia requiring oxygen at 4 liters continues. Sputum culture sent. Significant ground glass opacities on chest CT. Suspicion for HIV. Begun on IV TMP-SMX, steroids. Cardiology in, continue cardizem and lopressor. Not a good candidate for anticoags. Cardiology in. Day 2 of rocephin and zithro, negative BC x 2 to date. Continued hypoxia requiring oxygen. May need bronch washings. 5/10:  Rapid HIV positive with confirmation pending and high clinical suspicion. LDH elevated.  TERRY Prado concern for PCP pneumonia. Begun in IV TMP-SMX, steroid taper (40 mg bid x 5, 40 mg daily x 5, 20 mg daily x 11). ID in, patient denies IV drug use, admits to being treated for syphilis with PCN a few years ago. Also found thrush. Multiple additional tests ordered. Continue rocephin, zithromax and bactrim. Last screened for HIV in 2015 with negative findings. Sputum culture growing moderate gram negative rods, few gram positive rods, few yeast and +1 mucus. Blood culture 2/2 negative to date. Negative flu A and B. Nutrition in. Heart rate stable, changed to po diltiazem per Cards. Placed on non rebreather mask. Episode of hypoxia to 69% with plans to move to ICU out of concern for developing ARDs. Placed on optiflow 45% with marked improvement and saturation rise to 98%. Transfer held. 5/11:  Changed to 10 liters HFNC with good tolerance. Rate controlled. Switching heparin to eliquis 5 mg bid. RPR and Hep C antibody negative. Pending GC and chlamydia, treated empirically with rocephin and zithro. Continued IVF. Pulmonary in, to wean oxygen as tolerated, currently on 9 liters. Pending fungitell, weaning IVF. 5/12:  PT in. Scheduled xopenex, pulmicort continues. Decreased IVF to 50 ml/hr. ID, Pulmonary, Cardiology following. Feeling better this afternoon. Dr Hedy Cha in also. less coughing.       Current Facility-Administered Medications   Medication Dose Route Frequency    apixaban (ELIQUIS) tablet 5 mg  5 mg Oral Q12H    azithromycin (ZITHROMAX) tablet 500 mg  500 mg Oral QPM    trimethoprim-sulfamethoxazole (BACTRIM DS, SEPTRA DS) 160-800 mg per tablet 2 Tab  2 Tab Oral Q8H    predniSONE (DELTASONE) tablet 40 mg  40 mg Oral BID WITH MEALS    Followed by   Euell Cones ON 5/15/2018] predniSONE (DELTASONE) tablet 40 mg  40 mg Oral DAILY WITH BREAKFAST    Followed by   Euell Cones ON 5/20/2018] predniSONE (DELTASONE) tablet 20 mg  20 mg Oral DAILY WITH BREAKFAST    nystatin (MYCOSTATIN) 100,000 unit/mL oral suspension 500,000 Units  500,000 Units Oral QID    dilTIAZem CD (CARDIZEM CD) capsule 180 mg  180 mg Oral BID    famotidine (PEPCID) tablet 20 mg  20 mg Oral BID    metoprolol tartrate (LOPRESSOR) tablet 25 mg  25 mg Oral Q6H    sodium chloride (OCEAN) 0.65 % nasal squeeze bottle 2 Spray  2 Spray Both Nostrils Q2H PRN    guaiFENesin ER (MUCINEX) tablet 600 mg  600 mg Oral BID    HYDROcodone-homatropine (HYCODAN) 5-1.5 mg/5 mL (5 mL) syrup 5 mL  5 mL Oral Q6H PRN    budesonide (PULMICORT) 500 mcg/2 ml nebulizer suspension  500 mcg Nebulization BID RT    acetaminophen (TYLENOL) tablet 650 mg  650 mg Oral Q6H PRN    traMADol (ULTRAM) tablet 50 mg  50 mg Oral Q6H PRN    0.9% sodium chloride infusion  50 mL/hr IntraVENous CONTINUOUS    cefTRIAXone (ROCEPHIN) 2 g in 0.9% sodium chloride (MBP/ADV) 50 mL  2 g IntraVENous Q24H    levalbuterol (XOPENEX) nebulizer soln 0.63 mg/3 mL  0.63 mg Nebulization Q6H RT        Review of Systems  A comprehensive review of systems was negative except for that written in the HPI. Objective:     Visit Vitals    /67 (BP 1 Location: Left arm, BP Patient Position: At rest)    Pulse (!) 113    Temp 97.7 °F (36.5 °C)    Resp 18    Ht 6' 2\" (1.88 m)    Wt 71.2 kg (156 lb 14.4 oz)    SpO2 91%    BMI 20.14 kg/m2    O2 Flow Rate (L/min): 8 l/min (weaned to 6L) O2 Device: Hi flow nasal cannula    Temp (24hrs), Av.5 °F (36.4 °C), Min:97.2 °F (36.2 °C), Max:97.9 °F (36.6 °C)     07 -  1900  In: -   Out: 500 [Urine:500]  05/10 1901 -  0700  In: 2482 [P.O.:820; I.V.:1662]  Out: 650 [Urine:650]    General appearance: sitting up in bed, reports feeling better. Minimal cough today. Oriented and alert, cooperative, thin. Appetite improving.    Head: Normocephalic, without obvious abnormality, atraumatic  Throat: Lips, mucosa, and tongue normal. Teeth and gums normal  Neck: supple, symmetrical.  trachea midline, no JVD  Lungs: Very faint, scattered rhonchi. No wheezing, otherwise clear to auscultation bilaterally  Heart: Irregular rate and rhythm, Rate control. S1, S2 normal, no murmur, click, rub or gallop  Abdomen: soft, non-tender. Bowel sounds normal. No masses,  no organomegaly  Extremities: extremities normal, atraumatic, no cyanosis or edema  Skin: Skin color, texture, turgor normal. No rashes or lesions  Neurologic: Grossly normal    Additional comments:  NOTES, ORDERS, TEST RESULTS, VITALS REVIEWED. Data Review  Recent Results (from the past 24 hour(s))   PTT    Collection Time: 05/12/18  4:13 AM   Result Value Ref Range    aPTT 38.0 (H) 23.2 - 35.3 SEC     ECHOCARDIOGRAM:  Echo: Left ventricle: Systolic function was at the lower limits of normal.  Ejection fraction was estimated in the range of 50 % to 55 %. There were no regional wall motion abnormalities. -  Atrial septum: Agitated saline contrast injection (bubble study) was performed. There was no right-to-left shunt, with provocative maneuvers to increase right atrial pressure. -  Mitral valve: There was mild regurgitation. -  Tricuspid valve: There was mild regurgitation. -  Pericardium: A small pericardial effusion was identified posterior to the heart. There was no evidence of hemodynamic compromise     Abs CD4 [place] 25 (L) 359 - 1519 /uL Final     % CD 4 Pos Lymph 4.1 (L) 30.8 - 58.5 % Final     5/2/18:  CXR:  There is no developing focal infiltrate. There are no effusions. The heart size is normal.  The bony thorax is intact. IMPRESSION: No acute findings in the chest    5/8/18:  CXR: The cardiac and mediastinal silhouette are normal in size and configuration. Moderate patchy bilateral airspace opacities are present. There are no pleural effusions. Impression: Patchy bilateral airspace opacities could represent pulmonary edema or pneumonia.     5/8:  CT CHEST :Heart size normal. No pericardial effusion or pleural effusion.  Limited evaluation of the noncontrast upper abdomen shows no acute abnormality. No acute or aggressive osseous lesion. No pathologic adenopathy or pneumothorax. Diffuse perihilar groundglass pulmonary infiltrates. Presence of the infiltrates limits full evaluation for small lesion. Impression:  Diffuse perihilar pulmonary infiltrates. FINDINGS are nonspecific and may represent atypical appearance of edema, infection, other alveolar process such as can be seen with pulmonary hemorrhage. 5/9:  CXR:  Fluffy opacities blurring the vascular borders and abdominal perihilar distribution worsened from the reference study. Cardiac silhouette is felt to be upper normal in size. IMPRESSION: Worsening opacities in the lungs may be edema. Diffuse haziness over both lungs similar to the prior exam without  definite change. Relative sparing the extreme apices and extreme lung bases. Costophrenic angles are sharp. Heart size normal.   IMPRESSION:  Diffuse edema-like pattern throughout both lungs some yesterday's exam.       5/10:  CXR:  Diffuse haziness over both lungs similar to the prior exam without definite change. Relative sparing the extreme apices and extreme lung bases. Costophrenic angles are sharp. Heart size normal.   IMPRESSION:  Diffuse edema-like pattern throughout both lungs some yesterday's exam.     5/11:  CXR:  Cardiac silhouette and pulmonary vascularity are obscured by hazy opacity in both lungs with air bronchograms. IMPRESSION: Findings suggest pulmonary edema, overall no significant interval change.     RPR:  NR  HCV:  Ab: 120.19  GC/Chlamydia:  Negative     Assessment/Plan:   Cough  Respiratory failure with Hypoxia  Leukocytosis  Tachycardia  Sepsis  Fever  New onset atrial fib with RVR  Recreational drug use  HIV+ new diagnosis  PCP (pneumocystis carinii pneumonia)  Thrush    PLAN:  Per ID: continue prednisone taper, change to levaquin on discharge, nystatin ordered (see ID note 5/10)  Patient counseled at length per ID regarding new diagnosis HIV  AM labs  Continue eliquis, zithromax, rocephin, bactrim DS  Scheduled xopenex      Care Plan discussed with: Patient, RN, MD    Signed By: Ebonie Mathias NP     May 12, 2018

## 2018-05-13 LAB
ALBUMIN SERPL-MCNC: 1.7 G/DL (ref 3.5–5)
ALBUMIN/GLOB SERPL: 0.3 {RATIO} (ref 1.2–3.5)
ALP SERPL-CCNC: 46 U/L (ref 50–136)
ALT SERPL-CCNC: 13 U/L (ref 12–65)
ANION GAP SERPL CALC-SCNC: 7 MMOL/L (ref 7–16)
AST SERPL-CCNC: 54 U/L (ref 15–37)
BACTERIA SPEC CULT: NORMAL
BASOPHILS # BLD: 0 K/UL (ref 0–0.2)
BASOPHILS NFR BLD: 0 % (ref 0–2)
BILIRUB SERPL-MCNC: 0.3 MG/DL (ref 0.2–1.1)
BUN SERPL-MCNC: 18 MG/DL (ref 6–23)
CALCIUM SERPL-MCNC: 9.4 MG/DL (ref 8.3–10.4)
CHLORIDE SERPL-SCNC: 105 MMOL/L (ref 98–107)
CO2 SERPL-SCNC: 27 MMOL/L (ref 21–32)
CREAT SERPL-MCNC: 0.81 MG/DL (ref 0.8–1.5)
DIFFERENTIAL METHOD BLD: ABNORMAL
EOSINOPHIL # BLD: 0 K/UL (ref 0–0.8)
EOSINOPHIL NFR BLD: 0 % (ref 0.5–7.8)
ERYTHROCYTE [DISTWIDTH] IN BLOOD BY AUTOMATED COUNT: 12.8 % (ref 11.9–14.6)
GLOBULIN SER CALC-MCNC: 5.1 G/DL (ref 2.3–3.5)
GLUCOSE SERPL-MCNC: 90 MG/DL (ref 65–100)
GRAM STN SPEC: NORMAL
HCT VFR BLD AUTO: 29.8 % (ref 41.1–50.3)
HGB BLD-MCNC: 9.9 G/DL (ref 13.6–17.2)
IMM GRANULOCYTES # BLD: 0.1 K/UL (ref 0–0.5)
IMM GRANULOCYTES NFR BLD AUTO: 1 % (ref 0–5)
LYMPHOCYTES # BLD: 0.4 K/UL (ref 0.5–4.6)
LYMPHOCYTES NFR BLD: 3 % (ref 13–44)
MAGNESIUM SERPL-MCNC: 2.2 MG/DL (ref 1.8–2.4)
MCH RBC QN AUTO: 30.7 PG (ref 26.1–32.9)
MCHC RBC AUTO-ENTMCNC: 33.2 G/DL (ref 31.4–35)
MCV RBC AUTO: 92.5 FL (ref 79.6–97.8)
MONOCYTES # BLD: 0.4 K/UL (ref 0.1–1.3)
MONOCYTES NFR BLD: 3 % (ref 4–12)
NEUTS SEG # BLD: 12.2 K/UL (ref 1.7–8.2)
NEUTS SEG NFR BLD: 93 % (ref 43–78)
PLATELET # BLD AUTO: 378 K/UL (ref 150–450)
PMV BLD AUTO: 10.6 FL (ref 10.8–14.1)
POTASSIUM SERPL-SCNC: 4.8 MMOL/L (ref 3.5–5.1)
PROT SERPL-MCNC: 6.8 G/DL (ref 6.3–8.2)
RBC # BLD AUTO: 3.22 M/UL (ref 4.23–5.67)
SERVICE CMNT-IMP: NORMAL
SODIUM SERPL-SCNC: 139 MMOL/L (ref 136–145)
WBC # BLD AUTO: 13 K/UL (ref 4.3–11.1)

## 2018-05-13 PROCEDURE — 74011250637 HC RX REV CODE- 250/637: Performed by: NURSE PRACTITIONER

## 2018-05-13 PROCEDURE — 36415 COLL VENOUS BLD VENIPUNCTURE: CPT | Performed by: NURSE PRACTITIONER

## 2018-05-13 PROCEDURE — 77010033711 HC HIGH FLOW OXYGEN

## 2018-05-13 PROCEDURE — 65660000000 HC RM CCU STEPDOWN

## 2018-05-13 PROCEDURE — 74011000250 HC RX REV CODE- 250: Performed by: INTERNAL MEDICINE

## 2018-05-13 PROCEDURE — 85025 COMPLETE CBC W/AUTO DIFF WBC: CPT | Performed by: NURSE PRACTITIONER

## 2018-05-13 PROCEDURE — 94640 AIRWAY INHALATION TREATMENT: CPT

## 2018-05-13 PROCEDURE — 74011250636 HC RX REV CODE- 250/636: Performed by: INTERNAL MEDICINE

## 2018-05-13 PROCEDURE — 74011250637 HC RX REV CODE- 250/637: Performed by: INTERNAL MEDICINE

## 2018-05-13 PROCEDURE — 83735 ASSAY OF MAGNESIUM: CPT | Performed by: NURSE PRACTITIONER

## 2018-05-13 PROCEDURE — 74011636637 HC RX REV CODE- 636/637: Performed by: INTERNAL MEDICINE

## 2018-05-13 PROCEDURE — 99232 SBSQ HOSP IP/OBS MODERATE 35: CPT | Performed by: INTERNAL MEDICINE

## 2018-05-13 PROCEDURE — 74011000258 HC RX REV CODE- 258: Performed by: INTERNAL MEDICINE

## 2018-05-13 PROCEDURE — 94760 N-INVAS EAR/PLS OXIMETRY 1: CPT

## 2018-05-13 PROCEDURE — 80053 COMPREHEN METABOLIC PANEL: CPT | Performed by: NURSE PRACTITIONER

## 2018-05-13 RX ORDER — OXYMETAZOLINE HCL 0.05 %
2 SPRAY, NON-AEROSOL (ML) NASAL 2 TIMES DAILY
Status: COMPLETED | OUTPATIENT
Start: 2018-05-13 | End: 2018-05-15

## 2018-05-13 RX ORDER — LORATADINE 10 MG/1
10 TABLET ORAL DAILY
Status: DISCONTINUED | OUTPATIENT
Start: 2018-05-13 | End: 2018-05-15 | Stop reason: HOSPADM

## 2018-05-13 RX ORDER — GUAIFENESIN 600 MG/1
1200 TABLET, EXTENDED RELEASE ORAL 2 TIMES DAILY
Status: DISCONTINUED | OUTPATIENT
Start: 2018-05-13 | End: 2018-05-14

## 2018-05-13 RX ORDER — METOPROLOL TARTRATE 50 MG/1
50 TABLET ORAL EVERY 12 HOURS
Status: DISCONTINUED | OUTPATIENT
Start: 2018-05-14 | End: 2018-05-14

## 2018-05-13 RX ADMIN — AZITHROMYCIN 500 MG: 250 TABLET, FILM COATED ORAL at 17:19

## 2018-05-13 RX ADMIN — OXYMETAZOLINE HYDROCHLORIDE 2 SPRAY: 5 SPRAY NASAL at 17:19

## 2018-05-13 RX ADMIN — SODIUM CHLORIDE 50 ML/HR: 900 INJECTION, SOLUTION INTRAVENOUS at 02:19

## 2018-05-13 RX ADMIN — LORATADINE 10 MG: 10 TABLET ORAL at 14:51

## 2018-05-13 RX ADMIN — LEVALBUTEROL HYDROCHLORIDE 0.63 MG: 0.63 SOLUTION RESPIRATORY (INHALATION) at 14:19

## 2018-05-13 RX ADMIN — HYDROCODONE BITARTRATE AND HOMATROPINE METHYLBROMIDE 5 ML: 5; 1.5 SOLUTION ORAL at 21:42

## 2018-05-13 RX ADMIN — SULFAMETHOXAZOLE AND TRIMETHOPRIM 2 TABLET: 800; 160 TABLET ORAL at 08:11

## 2018-05-13 RX ADMIN — METOPROLOL TARTRATE 25 MG: 25 TABLET ORAL at 14:51

## 2018-05-13 RX ADMIN — FAMOTIDINE 20 MG: 20 TABLET ORAL at 08:11

## 2018-05-13 RX ADMIN — NYSTATIN 500000 UNITS: 100000 SUSPENSION ORAL at 08:10

## 2018-05-13 RX ADMIN — NYSTATIN 500000 UNITS: 100000 SUSPENSION ORAL at 21:48

## 2018-05-13 RX ADMIN — SULFAMETHOXAZOLE AND TRIMETHOPRIM 2 TABLET: 800; 160 TABLET ORAL at 16:23

## 2018-05-13 RX ADMIN — APIXABAN 5 MG: 5 TABLET, FILM COATED ORAL at 08:10

## 2018-05-13 RX ADMIN — DILTIAZEM HYDROCHLORIDE 180 MG: 180 CAPSULE, COATED, EXTENDED RELEASE ORAL at 17:19

## 2018-05-13 RX ADMIN — BUDESONIDE 500 MCG: 0.5 INHALANT RESPIRATORY (INHALATION) at 19:28

## 2018-05-13 RX ADMIN — LEVALBUTEROL HYDROCHLORIDE 0.63 MG: 0.63 SOLUTION RESPIRATORY (INHALATION) at 07:22

## 2018-05-13 RX ADMIN — NYSTATIN 500000 UNITS: 100000 SUSPENSION ORAL at 17:18

## 2018-05-13 RX ADMIN — HYDROCODONE BITARTRATE AND HOMATROPINE METHYLBROMIDE 5 ML: 5; 1.5 SOLUTION ORAL at 13:10

## 2018-05-13 RX ADMIN — SULFAMETHOXAZOLE AND TRIMETHOPRIM 2 TABLET: 800; 160 TABLET ORAL at 00:04

## 2018-05-13 RX ADMIN — GUAIFENESIN 600 MG: 600 TABLET, EXTENDED RELEASE ORAL at 08:10

## 2018-05-13 RX ADMIN — CEFTRIAXONE SODIUM 2 G: 2 INJECTION, POWDER, FOR SOLUTION INTRAMUSCULAR; INTRAVENOUS at 16:23

## 2018-05-13 RX ADMIN — PREDNISONE 40 MG: 20 TABLET ORAL at 16:23

## 2018-05-13 RX ADMIN — NYSTATIN 500000 UNITS: 100000 SUSPENSION ORAL at 12:16

## 2018-05-13 RX ADMIN — METOPROLOL TARTRATE 25 MG: 25 TABLET ORAL at 21:42

## 2018-05-13 RX ADMIN — METOPROLOL TARTRATE 25 MG: 25 TABLET ORAL at 09:51

## 2018-05-13 RX ADMIN — LEVALBUTEROL HYDROCHLORIDE 0.63 MG: 0.63 SOLUTION RESPIRATORY (INHALATION) at 01:46

## 2018-05-13 RX ADMIN — APIXABAN 5 MG: 5 TABLET, FILM COATED ORAL at 21:42

## 2018-05-13 RX ADMIN — METOPROLOL TARTRATE 25 MG: 25 TABLET ORAL at 04:31

## 2018-05-13 RX ADMIN — HYDROCODONE BITARTRATE AND HOMATROPINE METHYLBROMIDE 5 ML: 5; 1.5 SOLUTION ORAL at 00:04

## 2018-05-13 RX ADMIN — FAMOTIDINE 20 MG: 20 TABLET ORAL at 17:19

## 2018-05-13 RX ADMIN — DILTIAZEM HYDROCHLORIDE 180 MG: 180 CAPSULE, COATED, EXTENDED RELEASE ORAL at 08:11

## 2018-05-13 RX ADMIN — BUDESONIDE 500 MCG: 0.5 INHALANT RESPIRATORY (INHALATION) at 07:21

## 2018-05-13 RX ADMIN — TRAMADOL HYDROCHLORIDE 50 MG: 50 TABLET, FILM COATED ORAL at 21:42

## 2018-05-13 RX ADMIN — LEVALBUTEROL HYDROCHLORIDE 0.63 MG: 0.63 SOLUTION RESPIRATORY (INHALATION) at 19:28

## 2018-05-13 RX ADMIN — GUAIFENESIN 1200 MG: 600 TABLET, EXTENDED RELEASE ORAL at 17:18

## 2018-05-13 RX ADMIN — PREDNISONE 40 MG: 20 TABLET ORAL at 08:11

## 2018-05-13 NOTE — PROGRESS NOTES
Patient c/o of nose being stuffy. O2 sat 88%, high flow NC increased to 9 L. Patient states he normally takes Claritin daily and has not been getting any while he's been here. Rigoberto Martin NP notified and will place new order.

## 2018-05-13 NOTE — PROGRESS NOTES
Caleb Lunsford  Admission Date: 5/8/2018             Daily Progress Note: 5/13/2018    The patient's chart is reviewed and the patient is discussed with the staff. Subjective:     29 yo male with a history of marijuana use, HTN and migrains. Presented with cough, dyspnea and hypoxia. Found to be in a.fib with RVR. CXR/CT with bilateral infiltrates.  Has tested positive for HIV (new diagnosis).  ID has seen and bactrim added for coverage for PJP.  Hypoxic and remains on high flow oxygen.-8L    Current Facility-Administered Medications   Medication Dose Route Frequency    apixaban (ELIQUIS) tablet 5 mg  5 mg Oral Q12H    azithromycin (ZITHROMAX) tablet 500 mg  500 mg Oral QPM    trimethoprim-sulfamethoxazole (BACTRIM DS, SEPTRA DS) 160-800 mg per tablet 2 Tab  2 Tab Oral Q8H    predniSONE (DELTASONE) tablet 40 mg  40 mg Oral BID WITH MEALS    Followed by   Sariah Mcdonough ON 5/15/2018] predniSONE (DELTASONE) tablet 40 mg  40 mg Oral DAILY WITH BREAKFAST    Followed by   Sariah Mcdonough ON 5/20/2018] predniSONE (DELTASONE) tablet 20 mg  20 mg Oral DAILY WITH BREAKFAST    nystatin (MYCOSTATIN) 100,000 unit/mL oral suspension 500,000 Units  500,000 Units Oral QID    dilTIAZem CD (CARDIZEM CD) capsule 180 mg  180 mg Oral BID    famotidine (PEPCID) tablet 20 mg  20 mg Oral BID    metoprolol tartrate (LOPRESSOR) tablet 25 mg  25 mg Oral Q6H    sodium chloride (OCEAN) 0.65 % nasal squeeze bottle 2 Spray  2 Spray Both Nostrils Q2H PRN    guaiFENesin ER (MUCINEX) tablet 600 mg  600 mg Oral BID    HYDROcodone-homatropine (HYCODAN) 5-1.5 mg/5 mL (5 mL) syrup 5 mL  5 mL Oral Q6H PRN    budesonide (PULMICORT) 500 mcg/2 ml nebulizer suspension  500 mcg Nebulization BID RT    acetaminophen (TYLENOL) tablet 650 mg  650 mg Oral Q6H PRN    traMADol (ULTRAM) tablet 50 mg  50 mg Oral Q6H PRN    0.9% sodium chloride infusion  50 mL/hr IntraVENous CONTINUOUS    cefTRIAXone (ROCEPHIN) 2 g in 0.9% sodium chloride (MBP/ADV) 50 mL  2 g IntraVENous Q24H    levalbuterol (XOPENEX) nebulizer soln 0.63 mg/3 mL  0.63 mg Nebulization Q6H RT       Review of Systems    Constitutional: negative for fever, chills, sweats  Cardiovascular: negative for chest pain, palpitations, syncope, edema  Gastrointestinal:  negative for dysphagia, reflux, vomiting, diarrhea, abdominal pain, or melena  Neurologic:  negative for focal weakness, numbness, headache    Objective:     Vitals:    05/13/18 0810 05/13/18 0926 05/13/18 1037 05/13/18 1046   BP: 126/78 124/72     Pulse: 100 97     Resp:  16     Temp:  97.9 °F (36.6 °C)     SpO2:  90% (!) 85% 92%   Weight:       Height:         Intake and Output:   05/11 1901 - 05/13 0700  In: 300 [P.O.:300]  Out: 1250 [Urine:1250]  05/13 0701 - 05/13 1900  In: -   Out: 400 [Urine:400]    Physical Exam:   Constitution:  the patient is well developed and in no acute distress  EENMT:  Sclera clear, pupils equal, oral mucosa moist  Respiratory: basilar crackles  Cardiovascular:  RRR without M,G,R  Gastrointestinal: soft and non-tender; with positive bowel sounds. Musculoskeletal: warm without cyanosis. There is no lower leg edema. Skin:  no jaundice or rashes, no wounds   Neurologic: no gross neuro deficits     Psychiatric:  alert and oriented x 3    CXR:       LAB  No results for input(s): GLUCPOC in the last 72 hours. No lab exists for component: GLPOC   Recent Labs      05/13/18   0348  05/11/18   0400   WBC  13.0*  9.1   HGB  9.9*  11.1*   HCT  29.8*  33.5*   PLT  378  259     Recent Labs      05/13/18   0348  05/11/18   0400   NA  139  138   K  4.8  4.5   CL  105  103   CO2  27  26   GLU  90  137*   BUN  18  16   CREA  0.81  0.78*   MG  2.2   --    CA  9.4  9.1   ALB  1.7*   --    TBILI  0.3   --    ALT  13   --    SGOT  54*   --      Recent Labs      05/10/18   1920   PH  7.37   PCO2  43   PO2  69*   HCO3  25     No results for input(s): LCAD, LAC in the last 72 hours.       Assessment:  (Medical Decision Making)     Hospital Problems  Date Reviewed: 5/12/2018          Codes Class Noted POA    * (Principal)PCP (pneumocystis carinii pneumonia) ([facility] ICD-10-CM: B59  ICD-9-CM: 136.3  5/10/2018 Yes        HIV positive ([facility] ICD-10-CM: Z21  ICD-9-CM: V08  5/10/2018 Yes        Respiratory failure with hypoxia ([facility] ICD-10-CM: J96.91  ICD-9-CM: 518.81  5/10/2018 Yes        Sepsis ([facility] ICD-10-CM: A41.9  ICD-9-CM: 038.9, 995.91  5/10/2018 Yes        Hypoxia ICD-10-CM: R09.02  ICD-9-CM: 799.02  5/9/2018 Yes        New onset a-fib [facility]) ICD-10-CM: I48.91  ICD-9-CM: 427.31  5/8/2018 Yes              Plan:  (Medical Decision Making)   1     Wean O2  2    bactrim  · --Continue Bactrim 5 mg/kg TID and Predisone 40 mg BID for 5 days both for 21 days with prednisone taper, then will likely need secondary ppx                            Continue CTX/azithro, can change to levofloxacin on discharge  3    Afrin spray-says nose is stopped up  More than 50% of the time documented was spent in face-to-face contact with the patient and in the care of the patient on the floor/unit where the patient is located.     Beau Wren MD

## 2018-05-13 NOTE — PROGRESS NOTES
Verbal bedside report received from Yanira Majano, [address] [place] [address]. Assumed care of patient.

## 2018-05-13 NOTE — PROGRESS NOTES
Verbal bedside report given to coco Monae RN. Patient's situation, background, assessment and recommendations provided. Opportunity for questions provided. Oncoming RN assumed care of patient.

## 2018-05-13 NOTE — PROGRESS NOTES
Problem: Falls - Risk of  Goal: *Absence of Falls  Document Tino Fall Risk and appropriate interventions in the flowsheet. Outcome: Progressing Towards Goal  Pt progressing towards goal. No falls since admission. Bed low and locked. Call light within reach. Side rails x 2. Gripper socks applied. Personal belongings within reach. Pt verbalizes understanding to call for assistance.      Fall Risk Interventions:  Mobility Interventions: Bed/chair exit alarm, Patient to call before getting OOB      Medication Interventions: Bed/chair exit alarm, Patient to call before getting OOB    Elimination Interventions: Call light in reach

## 2018-05-13 NOTE — PROGRESS NOTES
Bedside and Verbal shift change report given to [facility], [facility], RN (oncoming nurse) by self (offgoing nurse). Report included the following information SBAR, Kardex, MAR and Recent Results.

## 2018-05-13 NOTE — PROGRESS NOTES
Hospitalist Progress Note    Subjective:   Daily Progress Note: 5/13/2018 1:48 PM    History:  Patient with cough, congestion and intermittent chest pain x 2 weeks ago presented to ER 5/8 with sudden onset worsening symptoms, tachycardia to 150 and shortness of breath x 12 hours prior to admission. He also reported near syncope, palpitations, nausea and vomiting onset am of presentation. Wheezes and rales on presentation. One single episode of hemoptysis. Chills and night sweats. He was initially seen at an urgent care about 12 days ago and placed on zithromax and steroids. He was then seen by his PHP 5/1 with negative CXR and given unknown type of cough meds. History includes allergic rhinitis, childhood asthma and migraines. He smoked cigarettesx 5 years until February of this year, continues to smoke marijuana. He is found in atrial fib/flutter with RVR, WBC is 14.0 with left shift, PT: 16.1, PTT: 40.8. CXR with patchy bilateral airspace opacities as below. No history of cardiac problems or arrhythmias. Oxygen sat 92% on room air. Lactic acid 1.0. BNP normal. UDS positive for THC, PCP, and opiates. He was placed on cardizem drip after boluses x2, oxygen. Blood cultures done, nebs begun, heparin drip begun, cardiology and pulmonary consulted,  begun on zithromax and rocephin, and one liter of saline. Has had homosexual partners in the past     5/9:  Temp to 101.8. Pulmonary in. Negative blood cultures to day. Hypoxia requiring oxygen at 4 liters continues. Sputum culture sent. Significant ground glass opacities on chest CT. Suspicion for HIV. Begun on IV TMP-SMX, steroids. Cardiology in, continue cardizem and lopressor. Not a good candidate for anticoags. Cardiology in. Day 2 of rocephin and zithro, negative BC x 2 to date. Continued hypoxia requiring oxygen. May need bronch washings.      5/10:  Rapid HIV positive with confirmation pending and high clinical suspicion. LDH elevated.  [facility] concern for PCP pneumonia. Begun in IV TMP-SMX, steroid taper (40 mg bid x 5, 40 mg daily x 5, 20 mg daily x 11). ID in, patient denies IV drug use, admits to being treated for syphilis with PCN a few years ago. Also found thrush. Multiple additional tests ordered. Continue rocephin, zithromax and bactrim. Last screened for HIV in 2015 with negative findings. Sputum culture growing moderate gram negative rods, few gram positive rods, few yeast and +1 mucus. Blood culture 2/2 negative to date. Negative flu A and B. Nutrition in. Heart rate stable, changed to po diltiazem per Cards. Placed on non rebreather mask. Episode of hypoxia to 69% with plans to move to ICU out of concern for developing ARDs. Placed on optiflow 45% with marked improvement and saturation rise to 98%. Transfer held briefly.       5/11:  Changed to 10 liters HFNC with good tolerance. Rate controlled. Switching heparin to eliquis 5 mg bid. RPR and Hep C antibody negative. Pending GC and chlamydia, treated empirically with rocephin and zithro. Continued IVF. Pulmonary in, to wean oxygen as tolerated, currently on 9 liters. Pending fungitell, weaning IVF.       5/12:  PT in. Scheduled xopenex, pulmicort continues. Decreased IVF to 50 ml/hr. ID, Pulmonary, Cardiology following. Feeling better this afternoon. Dr Isrrael Carrillo in also. Less coughing. 5/13:  Having a rough day. Nasal congestion. SOB and hypoxia with minimal exertion and extended conversation. Continued oxygen to 8 liters with Pulmonary beginning to wean. Continues to expectorate thick tan sputum. Reports having difficulty taking a deep breath. Denies pain. Upbeat about HIV diagnosis, states he has many friends who are positive and he will take care of himself on discharge. Heart rate continues to average mid 90's. WBC up to 13.0 today, 9.1 yesterday. Hgb: 9.9 down from 11.1. RT continuing scheduled aerosols.   mucinex increased to 1200 mg q 12 hours, added claritin 10 mg daily and afrin per Pulmonary. Patient reports seasonal allergies and hayfever at baseline. Current Facility-Administered Medications   Medication Dose Route Frequency    oxymetazoline (AFRIN) 0.05 % nasal spray 2 Spray  2 Spray Both Nostrils BID    apixaban (ELIQUIS) tablet 5 mg  5 mg Oral Q12H    azithromycin (ZITHROMAX) tablet 500 mg  500 mg Oral QPM    trimethoprim-sulfamethoxazole (BACTRIM DS, SEPTRA DS) 160-800 mg per tablet 2 Tab  2 Tab Oral Q8H    predniSONE (DELTASONE) tablet 40 mg  40 mg Oral BID WITH MEALS    Followed by   Honey Linear ON 5/15/2018] predniSONE (DELTASONE) tablet 40 mg  40 mg Oral DAILY WITH BREAKFAST    Followed by   Honey Linear ON 5/20/2018] predniSONE (DELTASONE) tablet 20 mg  20 mg Oral DAILY WITH BREAKFAST    nystatin (MYCOSTATIN) 100,000 unit/mL oral suspension 500,000 Units  500,000 Units Oral QID    dilTIAZem CD (CARDIZEM CD) capsule 180 mg  180 mg Oral BID    famotidine (PEPCID) tablet 20 mg  20 mg Oral BID    metoprolol tartrate (LOPRESSOR) tablet 25 mg  25 mg Oral Q6H    sodium chloride (OCEAN) 0.65 % nasal squeeze bottle 2 Spray  2 Spray Both Nostrils Q2H PRN    guaiFENesin ER (MUCINEX) tablet 600 mg  600 mg Oral BID    HYDROcodone-homatropine (HYCODAN) 5-1.5 mg/5 mL (5 mL) syrup 5 mL  5 mL Oral Q6H PRN    budesonide (PULMICORT) 500 mcg/2 ml nebulizer suspension  500 mcg Nebulization BID RT    acetaminophen (TYLENOL) tablet 650 mg  650 mg Oral Q6H PRN    traMADol (ULTRAM) tablet 50 mg  50 mg Oral Q6H PRN    0.9% sodium chloride infusion  50 mL/hr IntraVENous CONTINUOUS    cefTRIAXone (ROCEPHIN) 2 g in 0.9% sodium chloride (MBP/ADV) 50 mL  2 g IntraVENous Q24H    levalbuterol (XOPENEX) nebulizer soln 0.63 mg/3 mL  0.63 mg Nebulization Q6H RT      Review of Systems  A comprehensive review of systems was negative except for that written in the HPI.     Objective:     Visit Vitals    /72 (BP 1 Location: Left arm, BP Patient Position: At rest)  Pulse 97    Temp 97.9 °F (36.6 °C)    Resp 16    Ht 6' 2\" (1.88 m)    Wt 70.4 kg (155 lb 4.8 oz)    SpO2 92%    BMI 19.94 kg/m2    O2 Flow Rate (L/min): 8 l/min O2 Device: Nasal cannula    Temp (24hrs), Av.8 °F (36.6 °C), Min:97.4 °F (36.3 °C), Max:98.2 °F (36.8 °C)    701 - 1900  In: -   Out: 900 [Urine:900]  1901 -  0700  In: 300 [P.O.:300]  Out: 1250 [Urine:1250]    General appearance: Sitting up in bed, doesn't feel well in general today. Nasal congestion, unable to take a deep breath, generalized weakness and fatigue. Continued cough with thick tan expectorant. No hemoptysis. Oriented and alert, cooperative, thin. In good spirits despite not feeling well. Head: Normocephalic, without obvious abnormality, atraumatic  Throat: Lips, mucosa, and tongue slightly dry. Teeth and gums normal  Neck: supple, symmetrical.  trachea midline, no JVD  Lungs: Very faint, scattered rhonchi. Rare wheeze, otherwise clear to auscultation bilaterally, harsh cough as noted above. Heart: Irregular rate and rhythm, Rate averaging in the mid 90's. S1, S2 normal, no murmur, click, rub or gallop  Abdomen: soft, non-tender. Bowel sounds normal. No masses,  no organomegaly  Extremities: extremities normal, atraumatic, no cyanosis or edema  Skin: Skin color, texture, turgor normal. No rashes or lesions  Neurologic: Grossly normal     Additional comments:  NOTES, ORDERS, TEST RESULTS, VITALS REVIEWED.      Data Review  Recent Results (from the past 24 hour(s))   CBC WITH AUTOMATED DIFF    Collection Time: 18  3:48 AM   Result Value Ref Range    WBC 13.0 (H) 4.3 - 11.1 K/uL    RBC 3.22 (L) 4.23 - 5.67 M/uL    HGB 9.9 (L) 13.6 - 17.2 g/dL    HCT 29.8 (L) 41.1 - 50.3 %    MCV 92.5 79.6 - 97.8 FL    MCH 30.7 26.1 - 32.9 PG    MCHC 33.2 31.4 - 35.0 g/dL    RDW 12.8 11.9 - 14.6 %    PLATELET 211 079 - 964 K/uL    MPV 10.6 (L) 10.8 - 14.1 FL    DF AUTOMATED      NEUTROPHILS 93 (H) 43 - 78 % LYMPHOCYTES 3 (L) 13 - 44 %    MONOCYTES 3 (L) 4.0 - 12.0 %    EOSINOPHILS 0 (L) 0.5 - 7.8 %    BASOPHILS 0 0.0 - 2.0 %    IMMATURE GRANULOCYTES 1 0.0 - 5.0 %    ABS. NEUTROPHILS 12.2 (H) 1.7 - 8.2 K/UL    ABS. LYMPHOCYTES 0.4 (L) 0.5 - 4.6 K/UL    ABS. MONOCYTES 0.4 0.1 - 1.3 K/UL    ABS. EOSINOPHILS 0.0 0.0 - 0.8 K/UL    ABS. BASOPHILS 0.0 0.0 - 0.2 K/UL    ABS. IMM. GRANS. 0.1 0.0 - 0.5 K/UL   METABOLIC PANEL, COMPREHENSIVE    Collection Time: 05/13/18  3:48 AM   Result Value Ref Range    Sodium 139 136 - 145 mmol/L    Potassium 4.8 3.5 - 5.1 mmol/L    Chloride 105 98 - 107 mmol/L    CO2 27 21 - 32 mmol/L    Anion gap 7 7 - 16 mmol/L    Glucose 90 65 - 100 mg/dL    BUN 18 6 - 23 MG/DL    Creatinine 0.81 0.8 - 1.5 MG/DL    GFR est AA >60 >60 ml/min/1.73m2    GFR est non-AA >60 >60 ml/min/1.73m2    Calcium 9.4 8.3 - 10.4 MG/DL    Bilirubin, total 0.3 0.2 - 1.1 MG/DL    ALT (SGPT) 13 12 - 65 U/L    AST (SGOT) 54 (H) 15 - 37 U/L    Alk. phosphatase 46 (L) 50 - 136 U/L    Protein, total 6.8 6.3 - 8.2 g/dL    Albumin 1.7 (L) 3.5 - 5.0 g/dL    Globulin 5.1 (H) 2.3 - 3.5 g/dL    A-G Ratio 0.3 (L) 1.2 - 3.5     MAGNESIUM    Collection Time: 05/13/18  3:48 AM   Result Value Ref Range    Magnesium 2.2 1.8 - 2.4 mg/dL     ECHOCARDIOGRAM:  Echo: Left ventricle: Systolic function was at the lower limits of normal.  Ejection fraction was estimated in the range of 50 % to 55 %. There were no regional wall motion abnormalities. -  Atrial septum: Agitated saline contrast injection (bubble study) was performed. There was no right-to-left shunt, with provocative maneuvers to increase right atrial pressure. -  Mitral valve: There was mild regurgitation. -  Tricuspid valve: There was mild regurgitation. -  Pericardium: A small pericardial effusion was identified posterior to the heart.  There was no evidence of hemodynamic compromise       Abs CD4 Lucien 25 (L) 359 - 1519 /uL Final      % CD 4 Pos Lymph 4.1 (L) 30.8 - 58.5 % Final      5/2/18:  CXR:  There is no developing focal infiltrate.  There are no effusions.  The heart size is normal.  The bony thorax is intact.    IMPRESSION: No acute findings in the chest     5/8/18:  CXR: The cardiac and mediastinal silhouette are normal in size and configuration. Moderate patchy bilateral airspace opacities are present. There are no pleural effusions. Impression: Patchy bilateral airspace opacities could represent pulmonary edema or pneumonia.     5/8:  CT CHEST :Heart size normal. No pericardial effusion or pleural effusion. Limited evaluation of the noncontrast upper abdomen shows no acute abnormality. No acute or aggressive osseous lesion. No pathologic adenopathy or pneumothorax. Diffuse perihilar groundglass pulmonary infiltrates. Presence of the infiltrates limits full evaluation for small lesion. Impression:  Diffuse perihilar pulmonary infiltrates. FINDINGS are nonspecific and may represent atypical appearance of edema, infection, other alveolar process such as can be seen with pulmonary hemorrhage.     5/9:  CXR:  Fluffy opacities blurring the vascular borders and abdominal perihilar distribution worsened from the reference study. Cardiac silhouette is felt to be upper normal in size. IMPRESSION: Worsening opacities in the lungs may be edema. Diffuse haziness over both lungs similar to the prior exam without  definite change. Relative sparing the extreme apices and extreme lung bases. Costophrenic angles are sharp. Heart size normal.   IMPRESSION:  Diffuse edema-like pattern throughout both lungs some yesterday's exam.       5/10:  CXR:  Diffuse haziness over both lungs similar to the prior exam without definite change. Relative sparing the extreme apices and extreme lung bases. Costophrenic angles are sharp.  Heart size normal.   IMPRESSION:  Diffuse edema-like pattern throughout both lungs some yesterday's exam.      5/11:  CXR:  Cardiac silhouette and pulmonary vascularity are obscured by hazy opacity in both lungs with air bronchograms. IMPRESSION: Findings suggest pulmonary edema, overall no significant interval change.     5/10 urine GC/chlamydia: negative  5/10 HIV screen: Reactive                        HIV Ab: positive                        VL: 1,090,000                        CD4 25 / 4.1%  5/10 RPR: NR  5/10 HCV Ab: 0.1  5/10 HepB sAb: 120.19  5/10 sputum: NRF  5/9 sputum: NRF    Assessment/Plan:   Cough  Respiratory failure with Hypoxia  Leukocytosis  Tachycardia  Sepsis  Fever  New onset atrial fib with RVR  Recreational drug use  HIV+ new diagnosis  PCP (pneumocystis carinii pneumonia)  Thrush  Allergic rhinitis    PLAN:   Per ID: continue prednisone taper, change to levaquin on discharge, nystatin ordered (see ID note 5/10)  Patient counseled at length per ID regarding new diagnosis HIV  AM labs  Continue eliquis, zithromax, rocephin, bactrim DS  Scheduled xopenex    Care Plan discussed with:  MD, RN, patient     Signed By: Shanda Wynn NP     May 13, 2018

## 2018-05-13 NOTE — PROGRESS NOTES
Critical Care Outreach Nurse Progress Report:    Subjective: In to assess pt secondary to MD concern. MEWS Score: 3 (05/12/18 1500)    Vitals:    05/12/18 1341 05/12/18 1500 05/12/18 1736 05/12/18 1930   BP:  135/67 120/87    Pulse:  (!) 113 (!) 101    Resp:  18 18    Temp:  97.7 °F (36.5 °C) 98 °F (36.7 °C)    SpO2: 97% 91% 92% 92%   Weight:       Height:            Objective: Pt found resting in bed watching TV. Pain Intensity 1: 0 (05/12/18 2004)  Pain Location 1: Back  Pain Intervention(s) 1: Medication (see MAR)  Patient Stated Pain Goal: 0    Assessment: Pt is alert and oriented x 4. Pt coughing up thick tan sputum. Pt states he gets SOB with any activity. O2 on 6 liters Hi claudio NC with sat of 92%. Hr on bedside monitor is 109, A-fib. Lungs clear with coughing. Pt instructed to call if he feels like he cannot get enough air or is short of breath while laying down not doing any activity. Plan: Program is completed. Will follow if needed.

## 2018-05-13 NOTE — PROGRESS NOTES
Bedside and Verbal shift change report given to self (oncoming nurse) by Halie Bateman RN (offgoing nurse). Report included the following information SBAR, Kardex, MAR and Recent Results.

## 2018-05-14 LAB
ALBUMIN SERPL-MCNC: 1.8 G/DL (ref 3.5–5)
ALBUMIN/GLOB SERPL: 0.3 {RATIO} (ref 1.2–3.5)
ALP SERPL-CCNC: 50 U/L (ref 50–136)
ALT SERPL-CCNC: 16 U/L (ref 12–65)
ANION GAP SERPL CALC-SCNC: 8 MMOL/L (ref 7–16)
AST SERPL-CCNC: 47 U/L (ref 15–37)
BASOPHILS # BLD: 0 K/UL (ref 0–0.2)
BASOPHILS NFR BLD: 0 % (ref 0–2)
BILIRUB SERPL-MCNC: 0.2 MG/DL (ref 0.2–1.1)
BUN SERPL-MCNC: 20 MG/DL (ref 6–23)
CALCIUM SERPL-MCNC: 9.6 MG/DL (ref 8.3–10.4)
CHLORIDE SERPL-SCNC: 103 MMOL/L (ref 98–107)
CO2 SERPL-SCNC: 27 MMOL/L (ref 21–32)
CREAT SERPL-MCNC: 0.83 MG/DL (ref 0.8–1.5)
DIFFERENTIAL METHOD BLD: ABNORMAL
EOSINOPHIL # BLD: 0 K/UL (ref 0–0.8)
EOSINOPHIL NFR BLD: 0 % (ref 0.5–7.8)
ERYTHROCYTE [DISTWIDTH] IN BLOOD BY AUTOMATED COUNT: 12.6 % (ref 11.9–14.6)
GLOBULIN SER CALC-MCNC: 5.4 G/DL (ref 2.3–3.5)
GLUCOSE SERPL-MCNC: 94 MG/DL (ref 65–100)
HCT VFR BLD AUTO: 31.4 % (ref 41.1–50.3)
HGB BLD-MCNC: 10.7 G/DL (ref 13.6–17.2)
IMM GRANULOCYTES # BLD: 0.1 K/UL (ref 0–0.5)
IMM GRANULOCYTES NFR BLD AUTO: 1 % (ref 0–5)
LYMPHOCYTES # BLD: 0.5 K/UL (ref 0.5–4.6)
LYMPHOCYTES NFR BLD: 5 % (ref 13–44)
MAGNESIUM SERPL-MCNC: 2.3 MG/DL (ref 1.8–2.4)
MCH RBC QN AUTO: 31 PG (ref 26.1–32.9)
MCHC RBC AUTO-ENTMCNC: 34.1 G/DL (ref 31.4–35)
MCV RBC AUTO: 91 FL (ref 79.6–97.8)
MONOCYTES # BLD: 0.2 K/UL (ref 0.1–1.3)
MONOCYTES NFR BLD: 2 % (ref 4–12)
NEUTS SEG # BLD: 9.1 K/UL (ref 1.7–8.2)
NEUTS SEG NFR BLD: 92 % (ref 43–78)
PLATELET # BLD AUTO: 463 K/UL (ref 150–450)
PMV BLD AUTO: 10.5 FL (ref 10.8–14.1)
POTASSIUM SERPL-SCNC: 4.8 MMOL/L (ref 3.5–5.1)
PROT SERPL-MCNC: 7.2 G/DL (ref 6.3–8.2)
RBC # BLD AUTO: 3.45 M/UL (ref 4.23–5.67)
SODIUM SERPL-SCNC: 138 MMOL/L (ref 136–145)
WBC # BLD AUTO: 9.9 K/UL (ref 4.3–11.1)

## 2018-05-14 PROCEDURE — 99232 SBSQ HOSP IP/OBS MODERATE 35: CPT | Performed by: INTERNAL MEDICINE

## 2018-05-14 PROCEDURE — 74011000250 HC RX REV CODE- 250: Performed by: INTERNAL MEDICINE

## 2018-05-14 PROCEDURE — 80053 COMPREHEN METABOLIC PANEL: CPT | Performed by: NURSE PRACTITIONER

## 2018-05-14 PROCEDURE — 83735 ASSAY OF MAGNESIUM: CPT | Performed by: NURSE PRACTITIONER

## 2018-05-14 PROCEDURE — 94640 AIRWAY INHALATION TREATMENT: CPT

## 2018-05-14 PROCEDURE — 65660000000 HC RM CCU STEPDOWN

## 2018-05-14 PROCEDURE — 85025 COMPLETE CBC W/AUTO DIFF WBC: CPT | Performed by: NURSE PRACTITIONER

## 2018-05-14 PROCEDURE — 74011250637 HC RX REV CODE- 250/637: Performed by: NURSE PRACTITIONER

## 2018-05-14 PROCEDURE — 74011250637 HC RX REV CODE- 250/637: Performed by: INTERNAL MEDICINE

## 2018-05-14 PROCEDURE — 77010033678 HC OXYGEN DAILY

## 2018-05-14 PROCEDURE — 74011636637 HC RX REV CODE- 636/637: Performed by: INTERNAL MEDICINE

## 2018-05-14 PROCEDURE — 36415 COLL VENOUS BLD VENIPUNCTURE: CPT | Performed by: NURSE PRACTITIONER

## 2018-05-14 PROCEDURE — 87281 PNEUMOCYSTIS CARINII AG IF: CPT | Performed by: INTERNAL MEDICINE

## 2018-05-14 PROCEDURE — 94760 N-INVAS EAR/PLS OXIMETRY 1: CPT

## 2018-05-14 PROCEDURE — 94761 N-INVAS EAR/PLS OXIMETRY MLT: CPT

## 2018-05-14 PROCEDURE — 97530 THERAPEUTIC ACTIVITIES: CPT

## 2018-05-14 RX ORDER — ALBUTEROL SULFATE 90 UG/1
2 AEROSOL, METERED RESPIRATORY (INHALATION)
Status: DISCONTINUED | OUTPATIENT
Start: 2018-05-14 | End: 2018-05-15 | Stop reason: HOSPADM

## 2018-05-14 RX ADMIN — ALBUTEROL SULFATE 2 PUFF: 90 AEROSOL, METERED RESPIRATORY (INHALATION) at 14:11

## 2018-05-14 RX ADMIN — METOPROLOL TARTRATE 50 MG: 50 TABLET ORAL at 08:08

## 2018-05-14 RX ADMIN — APIXABAN 5 MG: 5 TABLET, FILM COATED ORAL at 21:04

## 2018-05-14 RX ADMIN — SULFAMETHOXAZOLE AND TRIMETHOPRIM 2 TABLET: 800; 160 TABLET ORAL at 08:08

## 2018-05-14 RX ADMIN — GUAIFENESIN 1200 MG: 600 TABLET, EXTENDED RELEASE ORAL at 08:07

## 2018-05-14 RX ADMIN — METOPROLOL TARTRATE 75 MG: 50 TABLET ORAL at 21:04

## 2018-05-14 RX ADMIN — TRAMADOL HYDROCHLORIDE 50 MG: 50 TABLET, FILM COATED ORAL at 16:16

## 2018-05-14 RX ADMIN — SULFAMETHOXAZOLE AND TRIMETHOPRIM 2 TABLET: 800; 160 TABLET ORAL at 23:53

## 2018-05-14 RX ADMIN — FAMOTIDINE 20 MG: 20 TABLET ORAL at 08:09

## 2018-05-14 RX ADMIN — SULFAMETHOXAZOLE AND TRIMETHOPRIM 2 TABLET: 800; 160 TABLET ORAL at 16:02

## 2018-05-14 RX ADMIN — DILTIAZEM HYDROCHLORIDE 180 MG: 180 CAPSULE, COATED, EXTENDED RELEASE ORAL at 08:08

## 2018-05-14 RX ADMIN — OXYMETAZOLINE HYDROCHLORIDE 2 SPRAY: 5 SPRAY NASAL at 17:31

## 2018-05-14 RX ADMIN — SULFAMETHOXAZOLE AND TRIMETHOPRIM 2 TABLET: 800; 160 TABLET ORAL at 00:21

## 2018-05-14 RX ADMIN — NYSTATIN 500000 UNITS: 100000 SUSPENSION ORAL at 13:41

## 2018-05-14 RX ADMIN — ALBUTEROL SULFATE 2 PUFF: 90 AEROSOL, METERED RESPIRATORY (INHALATION) at 19:50

## 2018-05-14 RX ADMIN — LORATADINE 10 MG: 10 TABLET ORAL at 08:08

## 2018-05-14 RX ADMIN — OXYMETAZOLINE HYDROCHLORIDE 2 SPRAY: 5 SPRAY NASAL at 08:10

## 2018-05-14 RX ADMIN — PREDNISONE 40 MG: 20 TABLET ORAL at 08:08

## 2018-05-14 RX ADMIN — DILTIAZEM HYDROCHLORIDE 180 MG: 180 CAPSULE, COATED, EXTENDED RELEASE ORAL at 17:28

## 2018-05-14 RX ADMIN — NYSTATIN 500000 UNITS: 100000 SUSPENSION ORAL at 08:09

## 2018-05-14 RX ADMIN — FAMOTIDINE 20 MG: 20 TABLET ORAL at 17:28

## 2018-05-14 RX ADMIN — PREDNISONE 40 MG: 20 TABLET ORAL at 17:28

## 2018-05-14 RX ADMIN — APIXABAN 5 MG: 5 TABLET, FILM COATED ORAL at 08:08

## 2018-05-14 RX ADMIN — NYSTATIN 500000 UNITS: 100000 SUSPENSION ORAL at 17:27

## 2018-05-14 RX ADMIN — LEVALBUTEROL HYDROCHLORIDE 0.63 MG: 0.63 SOLUTION RESPIRATORY (INHALATION) at 07:35

## 2018-05-14 RX ADMIN — NYSTATIN 500000 UNITS: 100000 SUSPENSION ORAL at 21:09

## 2018-05-14 RX ADMIN — HYDROCODONE BITARTRATE AND HOMATROPINE METHYLBROMIDE 5 ML: 5; 1.5 SOLUTION ORAL at 13:50

## 2018-05-14 RX ADMIN — LEVALBUTEROL HYDROCHLORIDE 0.63 MG: 0.63 SOLUTION RESPIRATORY (INHALATION) at 01:20

## 2018-05-14 RX ADMIN — BUDESONIDE 500 MCG: 0.5 INHALANT RESPIRATORY (INHALATION) at 07:35

## 2018-05-14 RX ADMIN — HYDROCODONE BITARTRATE AND HOMATROPINE METHYLBROMIDE 5 ML: 5; 1.5 SOLUTION ORAL at 21:05

## 2018-05-14 NOTE — PROGRESS NOTES
Follow up with patient for d/c plans. He states planning to d/c home and explained probable needs O2 and CM will assist with ordering O2 in network with his insurance. Referral sent to [address] [place] [address] for O2 at 3 liters NC with confirmation received. Current d/c plan is home with home health RN/PT and O2 equipment.

## 2018-05-14 NOTE — PROGRESS NOTES
End of shift note: pt sat up in chair for good part of day today. No complaints of SOB. Pain has been managed with PO meds. O2 able to be weaned to 3L in which the patient will go home on. Pt is pleasant and upbeat. Pt with no complaints and otherwise resting comfortably at present. No apparent needs or distress noted.

## 2018-05-14 NOTE — PROGRESS NOTES
Bedside and Verbal shift change report given to [facility] (oncoming nurse) by self (offgoing nurse). Report included the following information SBAR, Kardex, MAR and Recent Results.

## 2018-05-14 NOTE — PROGRESS NOTES
Verbal bedside report received from Joseline Lopez, [address] [place] [address]. Patient's situation, background, assessment and recommendations were provided. Kardex, Mar, and recent results also reviewed. Opportunity for questions provided. Assumed care of patient.

## 2018-05-14 NOTE — PROGRESS NOTES
Problem: Mobility Impaired (Adult and Pediatric)  Goal: *Acute Goals and Plan of Care (Insert Text)  LTG:  (1.)Mr. Carmen López will transfer from bed to chair and chair to bed with MODIFIED INDEPENDENCE using the least restrictive device within 7 treatment day(s). (2.)Mr. Carmen López will ambulate with MODIFIED INDEPENDENCE for 500 feet with the least restrictive device and SpO2>90% within 7 treatment day(s). (3.)Mr. Carmen López will ascend and descend 15 stairs using B hand rail(s) with SUPERVISION and SpO2>90% to improve functional mobility and safety within 7 day(s). ________________________________________________________________________________________________      PHYSICAL THERAPY: Daily Note, Treatment Day: 1st, AM 5/14/2018  INPATIENT: Hospital Day: 7  Payor: Dionne Hopper / Plan: [facility] / Product Type: PPO /      NAME/AGE/GENDER: Kourtney Gupta is a 28 y.o. male   PRIMARY DIAGNOSIS: Pneumonia  New onset a-fib ([facility] PCP (pneumocystis carinii pneumonia) ([facility] PCP (pneumocystis carinii pneumonia) ([facility]        ICD-10: Treatment Diagnosis:    · Other abnormalities of gait and mobility (R26.89)   Precaution/Allergies:  Review of patient's allergies indicates no known allergies. ASSESSMENT:     Mr. Carmen López was supine in bed upon physical therapist entry to room this session. He was able to sit up at the edge of the bed independently, and then required S to CGA to ambulate with no AD for bout of 15 feet within the room. His O2 saturation decreased to 87% (on 4 L O2), so he required sitting rest break of several minutes with cues for pursed lip breathing technique to increase his O2 sats to above 90% again. He then was able to ambulate again with S-CGA with no AD for bout of 60 feet out into the hallway before returning to recliner at bedside. His O2 saturation level decreased to 82%, but he was again able to raise it up past 90% with pursed lip breathing technique.  He was left sitting in recliner with all needs met at end of session. Of note, pt has recent diagnosis of HIV. Mr. Karen Au could continue to benefit from skilled PT as he is currently functioning below his baseline. This section established at most recent assessment   PROBLEM LIST (Impairments causing functional limitations):  1. Decreased Activity Tolerance  2. Increased Fatigue  3. Increased Shortness of Breath   INTERVENTIONS PLANNED: (Benefits and precautions of physical therapy have been discussed with the patient.)  1. Family Education  2. Gait Training  3. Therapeutic Activites  4. Therapeutic Exercise/Strengthening  5. Transfer Training  6. Group Therapy     TREATMENT PLAN: Frequency/Duration: 3 times a week for duration of hospital stay  Rehabilitation Potential For Stated Goals: Good     RECOMMENDED REHABILITATION/EQUIPMENT: (at time of discharge pending progress): Due to the probability of continued deficits (see above) this patient will not likely need continued skilled physical therapy after discharge. Equipment:    None at this time              HISTORY:   History of Present Injury/Illness (Reason for Referral):  Per H&P: \"HISTORY OF PRESENT ILLNESS:  This is a 39-year-old male patient who has a past medical history of allergic rhinitis and asthma as a child, who came into the emergency room with a chief complaint of cough and severe shortness of breath.     According to the patient, since the last week of 04/2018, he started having progressive shortness of breath and cough. He thought that it was an episode of asthma exacerbation secondary to the pollen in the environment. He visited an urgent care center and over there he was treated with a steroid taper and a Z-Tung. He was referred to a primary care office and he saw Dr. Figueroa Living on 04/27/2018. At that time, the plan was to continue with the prednisone taper and to monitor his clinical progress.   According to the patient, he persisted having worsening shortness of breath and he saw Dr. Kaylee Otto again on 05/01/2018. At that time, a chest x-ray was ordered due to persistence of symptoms. The chest x-ray done on 05/02/2018 did not show any evidence of any lung infiltrate. Unfortunately, the patient persisted having worsening symptoms. He says that he has lost his appetite, that he has had persistent and productive cough. When he wakes up in the morning his sputum is greenish, but during the day it clears up. He also complains of some episodes of chills and night sweats. The patient also reports wheezing mainly at night, nausea and several episodes of vomiting, the last one this morning. Today, he called Dr. Kaylee Otto and he advised the patient to come into the emergency room. He activated the EMS and he was brought into the emergency room of [facility].  When he arrived here, his vital signs were blood pressure of 113/76, heart rate of 163, respiratory rate of 20, O2 saturation of 96. He had an irregularly irregular rate and rhythm. He had decreased breath sounds in both lung bases and his abdomen was soft and nontender. An EKG was done and showed atrial fibrillation with rapid ventricular response. His initial blood workup reported lactic acid of 1.0, a white blood cell count of 14, normal kidney function, procalcitonin of 0.6, and a BNP of 16. Chest x-ray was done and showed patchy bilateral airspace opacities, which could represent pulmonary edema or pneumonia. The patient received IV ceftriaxone and Zithromax in the emergency room. He also received 1 liter of normal saline and he was treated with 2 boluses of IV Cardizem and he was started on a Cardizem drip. He was presented to the hospitalist team to be admitted. \"  Past Medical History/Comorbidities:   Mr. Jun Kim  has a past medical history of Asthma and Migraines. Mr. Jun Kim  has no past surgical history on file.   Social History/Living Environment:   Home Environment: Apartment  # Steps to Enter: 15  Rails to Enter: Yes  Hand Rails : Bilateral  One/Two Story Residence: One story  Living Alone: Yes  Support Systems: Family member(s), Friends \ neighbors  Patient Expects to be Discharged to[de-identified] Unknown  Current DME Used/Available at Home: None  Tub or Shower Type: Tub/Shower combination  Prior Level of Function/Work/Activity:  Lives in an apartment alone and PTA pt was independent with ADLs and ambulation. Number of Personal Factors/Comorbidities that affect the Plan of Care:  Lives alone 1-2: MODERATE COMPLEXITY   EXAMINATION:   Most Recent Physical Functioning:   Gross Assessment:                  Posture:     Balance:  Sitting: Intact  Standing: Intact Bed Mobility:  Supine to Sit: Independent  Wheelchair Mobility:     Transfers:  Sit to Stand: Independent  Stand to Sit: Independent  Bed to Chair: Stand-by assistance  Gait:     Base of Support: Narrowed  Speed/Francy: Slow  Distance (ft): 60 Feet (ft) (1 bout of 15 feet within room; 1 bout of 60 feet in hallway)  Ambulation - Level of Assistance: Stand-by assistance;Contact guard assistance  Interventions: Verbal cues (cues for pursed lip breathing)      Body Structures Involved:  1. Heart  2. Lungs Body Functions Affected:  1. Cardio  2. Respiratory Activities and Participation Affected:  1. Mobility  2. Self Care  3. Domestic Life  4. Community, Social and [place]   Number of elements that affect the Plan of Care: 4+: HIGH COMPLEXITY   CLINICAL PRESENTATION:   Presentation: Stable and uncomplicated: LOW COMPLEXITY   CLINICAL DECISION MAKIN [facility] Mobility Inpatient Short Form  How much difficulty does the patient currently have. .. Unable A Lot A Little None   1. Turning over in bed (including adjusting bedclothes, sheets and blankets)? [] 1   [] 2   [] 3   [x] 4   2.   Sitting down on and standing up from a chair with arms ( e.g., wheelchair, bedside commode, etc.)   [] 1   [] 2   [] 3   [x] 4   3. Moving from lying on back to sitting on the side of the bed? [] 1   [] 2   [] 3   [x] 4   How much help from another person does the patient currently need. .. Total A Lot A Little None   4. Moving to and from a bed to a chair (including a wheelchair)? [] 1   [] 2   [x] 3   [] 4   5. Need to walk in hospital room? [] 1   [] 2   [x] 3   [] 4   6. Climbing 3-5 steps with a railing? [] 1   [] 2   [x] 3   [] 4   © 2007, Trustees of [address] [place] [address] [address] [id number], under license to [employer]. All rights reserved      Score:  Initial: 21 Most Recent: X (Date: -- )    Interpretation of Tool:  Represents activities that are increasingly more difficult (i.e. Bed mobility, Transfers, Gait). Score 24 23 22-20 19-15 14-10 9-7 6     Modifier CH CI CJ CK CL CM CN      ? Mobility - Walking and Moving Around:     - CURRENT STATUS: CJ - 20%-39% impaired, limited or restricted    - GOAL STATUS: CI - 1%-19% impaired, limited or restricted    - D/C STATUS:  ---------------To be determined---------------  Payor: BLUE CROSS / Plan: [place] BLUE CROSS OF [address] / Product Type: PPO /      Medical Necessity:     · Patient demonstrates good rehab potential due to higher previous functional level. Reason for Services/Other Comments:  · Patient continues to require skilled intervention due to decreased activity tolerance. Use of outcome tool(s) and clinical judgement create a POC that gives a: Clear prediction of patient's progress: LOW COMPLEXITY            TREATMENT:   (In addition to Assessment/Re-Assessment sessions the following treatments were rendered)   Pre-treatment Symptoms/Complaints:  Pt states no pain at beginning of session  Pain: Initial:   Pain Intensity 1: 0  Post Session:  No change reported     Therapeutic Activity: (    15 minutes):   Therapeutic activities including Bed transfers, Chair transfers and Ambulation on level ground to improve mobility, strength and dynamic movement of leg - bilateral to improve functional mobility. Required minimal Verbal cues (cues for pursed lip breathing) to promote static and dynamic balance in standing. Braces/Orthotics/Lines/Etc:   · O2 Device: Hi flow nasal cannula  Treatment/Session Assessment:    · Response to Treatment:  Pt continues to desaturate quickly with ambulation trials, but was able to increase ambulation distance to 60 feet this session. · Interdisciplinary Collaboration:   o Physical Therapist  o Registered Nurse  · After treatment position/precautions:   o Up in chair  o Bed/Chair-wheels locked  o Call light within reach   · Compliance with Program/Exercises: Will assess as treatment progresses. · Recommendations/Intent for next treatment session: \"Next visit will focus on advancements to more challenging activities and reduction in assistance provided\".   Total Treatment Duration:  PT Patient Time In/Time Out  Time In: 0853  Time Out: [address], DPT

## 2018-05-14 NOTE — PROGRESS NOTES
Bedside and Verbal shift change report given to self (oncoming nurse) by Ivan Peñaloza RN (offgoing nurse). Report included the following information SBAR, Kardex, MAR and Recent Results.

## 2018-05-14 NOTE — PROGRESS NOTES
Case discussed with NP. Full note to follow. AIDS with PCP pneumonia. Still requiring high concentrations of O2. Receiving TMP-SMX + prednisone. He is also on Ceftriaxone + zithromax. Afib controlled. ON anticoagulation with  eliquis. Followed by pulmonary and ID.

## 2018-05-14 NOTE — PROGRESS NOTES
Called to room by patient. Patient complaining of abd. Pain x 1 hour at  6/10.  Tramadol (see MAR) given per patient request. Will continue to monitor

## 2018-05-14 NOTE — PROGRESS NOTES
Infectious Disease Progress Note    Today's Date: 2018   Admit Date: 2018    Impression:   · Pneumonia -presumed PJP, responding clinically  · HIV/AIDS- CD4 25, VL > 1,000,000  · Hx of syphilis RPR neg  · Thrush  · AFib- rate 127 this am (EF nl, TSH nl)    Plan:     · Complete treatment dose Bactrim for 21 d through , then reduce to prophylactic dose  · Continue to wean prednisone  · Arrange Outpatient followup/initiation of ART (genotype pending)  · HCV ab neg, HBsab positive  · Afib management per Cardiology/hospitalists  · Discontinue CTX/Azithro          Anti-infectives:     Inpat Anti-Infectives     Start     Ordered Stop    05/10/18 1000  trimethoprim-sulfamethoxazole (BACTRIM) 320 mg in dextrose 5% 500 mL  15 mg/kg/day,   IntraVENous,   EVERY 8 HOURS      05/10/18 0913 --    18 1700  azithromycin (ZITHROMAX) 500 mg in 0.9% sodium chloride (MBP/ADV) 250 mL  500 mg,   IntraVENous,   EVERY 24 HOURS      18 1817 --    18 1600  cefTRIAXone (ROCEPHIN) 2 g in 0.9% sodium chloride (MBP/ADV) 50 mL  2 g,   IntraVENous,   EVERY 24 HOURS      18 1735 18 1559          Subjective: On optiflow 65%. Ambulating to bathroom. No Known Allergies     Review of Systems:  A comprehensive review of systems was negative except for that written in the History of Present Illness. Objective:     Visit Vitals    /66 (BP 1 Location: Left arm, BP Patient Position: At rest)    Pulse 89    Temp 97.3 °F (36.3 °C)    Resp 16    Ht 6' 2\" (1.88 m)    Wt 68.6 kg (151 lb 3.2 oz)    SpO2 96%    BMI 19.41 kg/m2     Temp (24hrs), Av.7 °F (36.5 °C), Min:96.6 °F (35.9 °C), Max:98.3 °F (36.8 °C)       Lines: PIVs    Physical Exam:    General:  Alert, cooperative, well noursished, no distress   Eyes:  Sclera anicteric. Pupils equally round and reactive to light. Mouth/Throat: +thrush   Neck: Supple   Lungs:   CTA   CV:  Irregular, rapid   Abdomen:   Soft, non-tender.  bowel sounds normal. non-distended   Extremities: No cyanosis or edema   Skin: Skin color, texture, turgor normal. no acute rash or lesions   Musculoskeletal: No swelling or deformity   Lines/Devices:  Intact, no erythema, drainage or tenderness   Psych: Alert and oriented, normal mood affect given the setting       Data Review:     CBC:  Recent Labs      05/14/18 0406 05/13/18 0348   WBC  9.9  13.0*   GRANS  92*  93*   MONOS  2*  3*   EOS  0*  0*   ANEU  9.1*  12.2*   ABL  0.5  0.4*   HGB  10.7*  9.9*   HCT  31.4*  29.8*   PLT  463*  378       BMP:  Recent Labs      05/14/18 0406 05/13/18 0348   CREA  0.83  0.81   BUN  20  18   NA  138  139   K  4.8  4.8   CL  103  105   CO2  27  27   AGAP  8  7   GLU  94  90       LFTS:  Recent Labs      05/14/18 0406 05/13/18 0348   TBILI  0.2  0.3   ALT  16  13   SGOT  47*  54*   AP  50  46*   TP  7.2  6.8   ALB  1.8*  1.7*       Microbiology:     All Micro Results     Procedure Component Value Units Date/Time    CULTURE, RESPIRATORY/SPUTUM/BRONCH Brown Songster STAIN [[id number]] Collected:  05/10/18 2022    Order Status:  Completed Specimen:  Sputum from Sputum Updated:  05/13/18 0713     Special Requests: NO SPECIAL REQUESTS        GRAM STAIN  5 TO 50 WBC'S/OIF      0 TO 5  EPITHELIAL CELLS SEEN /OIF              MODERATE GRAM NEGATIVE RODS      FEW GRAM NEGATIVE COCCI                 MODERATE GRAM POSITIVE RODS      RARE YEAST         1 + MUCUS     Culture result:         MODERATE NORMAL RESPIRATORY KODY    CULTURE, BLOOD [[id number]] Collected:  05/08/18 1547    Order Status:  Completed Specimen:  Whole Blood from Blood Updated:  05/13/18 0616     Special Requests: LEFT FOREARM        Culture result: NO GROWTH 5 DAYS       CULTURE, BLOOD [[id number]] Collected:  05/08/18 1547    Order Status:  Completed Specimen:  Whole Blood from Blood Updated:  05/13/18 0616     Special Requests: --        RIGHT  HAND       Culture result: NO GROWTH 5 DAYS       CULTURE, RESPIRATORY/SPUTUM/BRONCH Sang Leone [[id number]] Collected:  05/09/18 1131    Order Status:  Completed Specimen:  Sputum Updated:  05/11/18 0744     Special Requests: NO SPECIAL REQUESTS        GRAM STAIN 0 TO 30 WBC'S/OIF      0 TO 4 EPITHELIAL CELLS SEEN /OIF              MODERATE GRAM NEGATIVE RODS      FEW GRAM POSITIVE RODS         FEW YEAST         1 + MUCUS     Culture result:         MODERATE NORMAL RESPIRATORY KODY    INFLUENZA A & B AG (RAPID TEST) [[id number]] Collected:  05/08/18 1731    Order Status:  Completed Specimen:  Nasopharyngeal from Nasal washing Updated:  05/08/18 1750     Influenza A Ag NEGATIVE          NEGATIVE FOR THE PRESENCE OF INFLUENZA A ANTIGEN  INFECTION DUE TO INFLUENZA A CANNOT BE RULED OUT. BECAUSE THE ANTIGEN PRESENT IN THE SAMPLE MAY BE BELOW  THE DETECTION LIMIT OF THE TEST. A NEGATIVE TEST IS PRESUMPTIVE AND IT IS RECOMMENDED THAT THESE RESULTS BE CONFIRMED BY VIRAL CULTURE OR AN FDA-CLEARED INFLUENZA A AND B MOLECULAR ASSAY. Influenza B Ag NEGATIVE          NEGATIVE FOR THE PRESENCE OF INFLUENZA B ANTIGEN  INFECTION DUE TO INFLUENZA B CANNOT BE RULED OUT. BECAUSE THE ANTIGEN PRESENT IN THE SAMPLE MAY BE BELOW  THE DETECTION LIMIT OF THE TEST. A NEGATIVE TEST IS PRESUMPTIVE AND IT IS RECOMMENDED THAT THESE RESULTS BE CONFIRMED BY VIRAL CULTURE OR AN FDA-CLEARED INFLUENZA A AND B MOLECULAR ASSAY. Imaging:   CXR:  IMPRESSION: Worsening opacities in the lungs may be edema. CT:  Impression:   1. Diffuse perihilar pulmonary infiltrates. FINDINGS are nonspecific and may  represent atypical appearance of edema, infection, other alveolar process such  as can be seen with pulmonary hemorrhage.     Signed By: Ciaran Sandoval MD     May 14, 2018

## 2018-05-14 NOTE — PROGRESS NOTES
Patient reports increased cough. Hycodan (see MAR) given per patient request. Will continue to monitor.

## 2018-05-14 NOTE — PROGRESS NOTES
Oxygen Qualifier       Room air: SpO2 with O2 and liter flow   Resting SpO2  87%  93% on 3L   Ambulating SpO2   90% on 3L       Completed by:    Monse Garay, RT

## 2018-05-14 NOTE — PROGRESS NOTES
[facility]  Admission Date: 5/8/2018             Daily Progress Note: 5/14/2018    The patient's chart is reviewed and the patient is discussed with the staff. 29 yo male presented with cough, dyspnea and hypoxia. Found to be in a.fib with RVR.  CXR/CT with bilateral infiltrates.  Has tested positive for HIV (new diagnosis). ID has seen and bactrim added for coverage for PJP (presumed).      Subjective: Worried about mold at work - asking about how to handle. Aunt at bedside. Feels better overall.      Current Facility-Administered Medications   Medication Dose Route Frequency    oxymetazoline (AFRIN) 0.05 % nasal spray 2 Spray  2 Spray Both Nostrils BID    loratadine (CLARITIN) tablet 10 mg  10 mg Oral DAILY    guaiFENesin ER (MUCINEX) tablet 1,200 mg  1,200 mg Oral BID    metoprolol tartrate (LOPRESSOR) tablet 50 mg  50 mg Oral Q12H    apixaban (ELIQUIS) tablet 5 mg  5 mg Oral Q12H    trimethoprim-sulfamethoxazole (BACTRIM DS, SEPTRA DS) 160-800 mg per tablet 2 Tab  2 Tab Oral Q8H    predniSONE (DELTASONE) tablet 40 mg  40 mg Oral BID WITH MEALS    Followed by   Chelsea Keep ON 5/15/2018] predniSONE (DELTASONE) tablet 40 mg  40 mg Oral DAILY WITH BREAKFAST    Followed by   Chelsea Keep ON 5/20/2018] predniSONE (DELTASONE) tablet 20 mg  20 mg Oral DAILY WITH BREAKFAST    nystatin (MYCOSTATIN) 100,000 unit/mL oral suspension 500,000 Units  500,000 Units Oral QID    dilTIAZem CD (CARDIZEM CD) capsule 180 mg  180 mg Oral BID    famotidine (PEPCID) tablet 20 mg  20 mg Oral BID    sodium chloride (OCEAN) 0.65 % nasal squeeze bottle 2 Spray  2 Spray Both Nostrils Q2H PRN    HYDROcodone-homatropine (HYCODAN) 5-1.5 mg/5 mL (5 mL) syrup 5 mL  5 mL Oral Q6H PRN    budesonide (PULMICORT) 500 mcg/2 ml nebulizer suspension  500 mcg Nebulization BID RT    acetaminophen (TYLENOL) tablet 650 mg  650 mg Oral Q6H PRN    traMADol (ULTRAM) tablet 50 mg  50 mg Oral Q6H PRN    levalbuterol [facility]) nebulizer soln 0.63 mg/3 mL  0.63 mg Nebulization Q6H RT         Objective:     Vitals:    05/14/18 0735 05/14/18 0807 05/14/18 0917 05/14/18 0919   BP:  116/70 139/79    Pulse:  (!) 115 (!) 110    Resp:   20    Temp:   98 °F (36.7 °C)    SpO2: 91%  92% 93%   Weight:       Height:         Intake and Output:   05/12 1901 - 05/14 0700  In: 180 [P.O.:180]  Out: 2800 [Urine:2800]  05/14 0701 - 05/14 1900  In: 360 [P.O.:360]  Out: 750 [Urine:750]    Physical Exam:   Constitutional:  the patient is well developed and in no acute distress  HEENT:  Sclera clear, pupils equal, oral mucosa moist  Lungs: a few basilar crackles. Wearing 4 liter cannula  Cardiovascular:  Irregular and without M,G,R. A fib per telemetry  Abd/GI: soft and non-tender; with positive bowel sounds. Ext: warm without cyanosis. There is no lower leg edema. Musculoskeletal: moves all four extremities with equal strength  Skin:  no jaundice or rashes, no wounds   Neuro: no gross neuro deficits   Musculoskeletal: can ambulate. No deformity  Psychiatric: Calm. ROS: feels stronger, appetite better, less short of breath  Lines: peripheral IV    CHEST XRAY:       LAB  Recent Labs      05/14/18   0406  05/13/18   0348   WBC  9.9  13.0*   HGB  10.7*  9.9*   HCT  31.4*  29.8*   PLT  463*  378     Recent Labs      05/14/18   0406  05/13/18   0348   NA  138  139   K  4.8  4.8   CL  103  105   CO2  27  27   GLU  94  90   BUN  20  18   CREA  0.83  0.81   MG  2.3  2.2   ALB  1.8*  1.7*   SGOT  47*  54*         Assessment:  (Medical Decision Making)     Hospital Problems  Date Reviewed: 5/12/2018          Codes Class Noted POA    * (Principal)PCP (pneumocystis carinii pneumonia) ([facility] ICD-10-CM: B59  ICD-9-CM: 136.3  5/10/2018 Yes    Presumed.  On bactrim    HIV positive ([facility] ICD-10-CM: Z21  ICD-9-CM: V08  5/10/2018 Yes    New diagnosis    Respiratory failure with hypoxia ([facility] ICD-10-CM: J96.91  ICD-9-CM: 518.81  5/10/2018 Yes    Weaning oxygen support - associated with pneumonia    Sepsis ([facility] ICD-10-CM: A41.9  ICD-9-CM: 038.9, 995.91  5/10/2018 Yes    Secondary to pneumonia    Hypoxia ICD-10-CM: R09.02  ICD-9-CM: 799.02  5/9/2018 Yes    As above    New onset a-fib [facility]) ICD-10-CM: I48.91  ICD-9-CM: 427.31  5/8/2018 Yes    Some intermittent tachycardia. On eliquis          Plan:  (Medical Decision Making)   1. Bactrim/steroids per ID  2. Have weaned oxygen down to 4 liters. Have RT assess oxygen needs in preparation for discharge - ? Home soon  3. Increase lopressor some for better rate control. Also on cardizem  4. Continue bronchodilator (patient thinks it helps). Change to proair inhaler    Asim Navarro, NP     I have spoken with and examined the patient. I agree with the above assessment and plan as documented. Patient reports symptomatic improvement on therapy for PJP but continues to require 4lpm.    Gen: pleasant, on 4lpm  Lungs:crackles anteriorly and posteriorly, no active wheezes  Heart:  RRR with no Murmur/Rubs/Gallops  Ext: no edema    --continue day #5/21 bactrim and steroid course  --Wean O2 and arrange for home  --PJP DFA ordered to be sent from sputum to confirm clinical diagnosis. --will f/u in our office with CXR in one month. Arranging nebs at home. Huey Murcia MD    More than 50% of time documented was spent face-to-face contact with the patient and in the care of the patient on the floor/unit where the patient is located.

## 2018-05-14 NOTE — PROGRESS NOTES
Problem: Falls - Risk of  Goal: *Absence of Falls  Document Tino Fall Risk and appropriate interventions in the flowsheet. Outcome: Progressing Towards Goal  Fall Risk Interventions:  Mobility Interventions: Communicate number of staff needed for ambulation/transfer, Patient to call before getting OOB         Medication Interventions: Patient to call before getting OOB    Elimination Interventions: Call light in reach             Problem: Breathing Pattern - Ineffective  Goal: *Absence of hypoxia  Outcome: Progressing Towards Goal  Remains on 3L NC with sats lower 90's.   Unable to wean today    Plan is to be discharge with 3L NC

## 2018-05-14 NOTE — PROGRESS NOTES
Hospitalist Progress Note    Subjective:   Daily Progress Note: 5/14/2018 12:21 PM    History:  Patient with cough, congestion and intermittent chest pain x 2 weeks ago presented to ER 5/8 with sudden onset worsening symptoms, tachycardia to 150 and shortness of breath x 12 hours prior to admission. [facility] also reported near syncope, palpitations, nausea and vomiting onset am of presentation.  Wheezes and rales on presentation.  One single episode of hemoptysis.  Chills and night sweats. He was initially seen at an urgent care about 12 days ago and placed on zithromax and steroids. [facility] was then seen by his PHP 5/1 with negative CXR and given unknown type of cough meds.  History includes allergic rhinitis, childhood asthma and migraines. He smoked cigarettesx 5 years until February of this year, continues to smoke marijuana. He is found in atrial fib/flutter with RVR, WBC is 14.0 with left shift, PT: 16.1, PTT: 40.8.  CXR with patchy bilateral airspace opacities as below.  No history of cardiac problems or arrhythmias.  Oxygen sat 92% on room air.  Lactic acid 1.0. BNP normal. UDS positive for THC, PCP, and opiates. He was placed on cardizem drip after boluses x2, oxygen.  Blood cultures done, nebs begun, heparin drip begun, cardiology and pulmonary consulted, Ginny Tolliver on zithromax and rocephin, and one liter of saline.  Has had homosexual partners in the past      5/9:  Temp to 101.8.  Pulmonary in.  Negative blood cultures to day.  Hypoxia requiring oxygen at 4 liters continues.  Sputum culture sent. Significant ground glass opacities on chest CT.  Suspicion for HIV.  Begun on IV TMP-SMX, steroids.  Cardiology in, continue cardizem and lopressor. Not a good candidate for anticoags.  Cardiology in.  Day 2 of rocephin and zithro, negative BC x 2 to date. Continued hypoxia requiring oxygen. May need bronch washings.       5/10:  Rapid HIV positive with confirmation pending and high clinical suspicion. LDH elevated.  TERRY Prado concern for PCP pneumonia.  Begun in IV TMP-SMX, steroid taper (40 mg bid x 5, 40 mg daily x 5, 20 mg daily x 11).  ID in, patient denies IV drug use, admits to being treated for syphilis with PCN a few years ago. Marsarah beth Leong found thrush.  Multiple additional tests ordered.  Continue rocephin, zithromax and bactrim.  Last screened for HIV in 2015 with negative findings. Sputum culture growing moderate gram negative rods, few gram positive rods, few yeast and +1 mucus.  Blood culture 2/2 negative to date. Negative flu A and B.  Nutrition in.  Heart rate stable, changed to po diltiazem per Cards.  Placed on non rebreather mask.  Episode of hypoxia to 69% with plans to move to ICU out of concern for developing ARDs.  Placed on optiflow 45% with marked improvement and saturation rise to 98%.  Transfer held briefly.        5/11:  Changed to 10 liters HFNC with good tolerance.  Rate controlled.  Switching heparin to eliquis 5 mg bid.  RPR and Hep C antibody negative.  Pending GC and chlamydia, treated empirically with rocephin and zithro. Continued IVF.  Pulmonary in, to wean oxygen as tolerated, currently on 9 liters.  Pending fungitell, weaning IVF.        5/12:  PT in. Scheduled xopenex, pulmicort continues.  Decreased IVF to 50 ml/hr.  ID, Pulmonary, Cardiology following.  Feeling better this afternoon.  Dr Redmond  in also.  Less coughing.  Fungitell >500. HCV:  Negative     5/13:  Having a rough day. Nasal congestion. SOB and hypoxia with minimal exertion and extended conversation. Continued oxygen to 8 liters with Pulmonary beginning to wean. Continues to expectorate thick tan sputum. Reports having difficulty taking a deep breath. Denies pain. Upbeat about HIV diagnosis, states he has many friends who are positive and he will take care of himself on discharge. Heart rate continues to average mid 90's. WBC up to 13.0 today, 9.1 yesterday. Hgb: 9.9 down from 11.1. RT continuing scheduled aerosols.   mucinex increased to 1200 mg q 12 hours, added claritin 10 mg daily and afrin per Pulmonary. Patient reports seasonal allergies and hayfever at baseline.       5/14:  Up in chair, states he has more energy today. Much better today with less SOB and air hunger. Aunt at bedside. Labs normalizing. Rate creeping back up into the 120's. Oxygen sat decreased to 87% on 4 liters oxygen after ambulating 15 ft with PT. Dropped to 82% on 4 liters after ambulating 60 feet. May need home oxygen temporarily, patient is aware. Pulmonary ordering appropriate tests.       Current Facility-Administered Medications   Medication Dose Route Frequency    oxymetazoline (AFRIN) 0.05 % nasal spray 2 Spray  2 Spray Both Nostrils BID    loratadine (CLARITIN) tablet 10 mg  10 mg Oral DAILY    guaiFENesin ER (MUCINEX) tablet 1,200 mg  1,200 mg Oral BID    metoprolol tartrate (LOPRESSOR) tablet 50 mg  50 mg Oral Q12H    apixaban (ELIQUIS) tablet 5 mg  5 mg Oral Q12H    trimethoprim-sulfamethoxazole (BACTRIM DS, SEPTRA DS) 160-800 mg per tablet 2 Tab  2 Tab Oral Q8H    predniSONE (DELTASONE) tablet 40 mg  40 mg Oral BID WITH MEALS    Followed by   Honey Linear ON 5/15/2018] predniSONE (DELTASONE) tablet 40 mg  40 mg Oral DAILY WITH BREAKFAST    Followed by   Honey Linear ON 5/20/2018] predniSONE (DELTASONE) tablet 20 mg  20 mg Oral DAILY WITH BREAKFAST    nystatin (MYCOSTATIN) 100,000 unit/mL oral suspension 500,000 Units  500,000 Units Oral QID    dilTIAZem CD (CARDIZEM CD) capsule 180 mg  180 mg Oral BID    famotidine (PEPCID) tablet 20 mg  20 mg Oral BID    sodium chloride (OCEAN) 0.65 % nasal squeeze bottle 2 Spray  2 Spray Both Nostrils Q2H PRN    HYDROcodone-homatropine (HYCODAN) 5-1.5 mg/5 mL (5 mL) syrup 5 mL  5 mL Oral Q6H PRN    budesonide (PULMICORT) 500 mcg/2 ml nebulizer suspension  500 mcg Nebulization BID RT    acetaminophen (TYLENOL) tablet 650 mg  650 mg Oral Q6H PRN    traMADol (ULTRAM) tablet 50 mg  50 mg Oral Q6H PRN    levalbuterol (XOPENEX) nebulizer soln 0.63 mg/3 mL  0.63 mg Nebulization Q6H RT        Review of Systems  A comprehensive review of systems was negative except for that written in the HPI. Objective:     Visit Vitals    /79    Pulse (!) 110    Temp 98 °F (36.7 °C)    Resp 20    Ht 6' 2\" (1.88 m)    Wt 68.6 kg (151 lb 3.2 oz)    SpO2 93%  Comment: decreased to 83% after walking but increased again    BMI 19.41 kg/m2    O2 Flow Rate (L/min): 4 l/min O2 Device: Hi flow nasal cannula    Temp (24hrs), Av.7 °F (36.5 °C), Min:96.6 °F (35.9 °C), Max:98.3 °F (36.8 °C)     07 -  1900  In: 360 [P.O.:360]  Out: 750 [Urine:750]  1901 -  0700  In: 180 [P.O.:180]  Out: 2800 [Urine:2800]    General appearance: Sitting up in chair, feeling much better, color improved. Continued hypoxia with minimal exertion. Continued but improved nasal congestion, improved inspiration depth. Continued cough with thick tan expectorant. No hemoptysis. Oriented and alert, cooperative, thin. In good spirits with extremely positive attitude. Aunt at bedside. Head: Normocephalic, without obvious abnormality, atraumatic  Throat: Lips, mucosa, and tongue slightly dry. Teeth and gums normal  Neck: supple, symmetrical.  trachea midline, no JVD  Lungs: Very faint, scattered rhonchi. Rare wheeze, otherwise clear to auscultation bilaterally, harsh cough as noted above. Heart: Irregular rate and rhythm, Rate averaging in the mid 90's. S1, S2 normal, no murmur, click, rub or gallop  Abdomen: soft, non-tender. Bowel sounds normal. No masses,  no organomegaly  Extremities: extremities normal, atraumatic, no cyanosis or edema  Skin: Skin color, texture, turgor normal. No rashes or lesions  Neurologic: Grossly normal      Additional comments:  NOTES, ORDERS, TEST RESULTS, VITALS REVIEWED.      Data Review  Recent Results (from the past 24 hour(s))   CBC WITH AUTOMATED DIFF    Collection Time: 18  4:06 AM   Result Value Ref Range    WBC 9.9 4.3 - 11.1 K/uL    RBC 3.45 (L) 4.23 - 5.67 M/uL    HGB 10.7 (L) 13.6 - 17.2 g/dL    HCT 31.4 (L) 41.1 - 50.3 %    MCV 91.0 79.6 - 97.8 FL    MCH 31.0 26.1 - 32.9 PG    MCHC 34.1 31.4 - 35.0 g/dL    RDW 12.6 11.9 - 14.6 %    PLATELET 991 (H) 095 - 450 K/uL    MPV 10.5 (L) 10.8 - 14.1 FL    DF AUTOMATED      NEUTROPHILS 92 (H) 43 - 78 %    LYMPHOCYTES 5 (L) 13 - 44 %    MONOCYTES 2 (L) 4.0 - 12.0 %    EOSINOPHILS 0 (L) 0.5 - 7.8 %    BASOPHILS 0 0.0 - 2.0 %    IMMATURE GRANULOCYTES 1 0.0 - 5.0 %    ABS. NEUTROPHILS 9.1 (H) 1.7 - 8.2 K/UL    ABS. LYMPHOCYTES 0.5 0.5 - 4.6 K/UL    ABS. MONOCYTES 0.2 0.1 - 1.3 K/UL    ABS. EOSINOPHILS 0.0 0.0 - 0.8 K/UL    ABS. BASOPHILS 0.0 0.0 - 0.2 K/UL    ABS. IMM. GRANS. 0.1 0.0 - 0.5 K/UL   METABOLIC PANEL, COMPREHENSIVE    Collection Time: 05/14/18  4:06 AM   Result Value Ref Range    Sodium 138 136 - 145 mmol/L    Potassium 4.8 3.5 - 5.1 mmol/L    Chloride 103 98 - 107 mmol/L    CO2 27 21 - 32 mmol/L    Anion gap 8 7 - 16 mmol/L    Glucose 94 65 - 100 mg/dL    BUN 20 6 - 23 MG/DL    Creatinine 0.83 0.8 - 1.5 MG/DL    GFR est AA >60 >60 ml/min/1.73m2    GFR est non-AA >60 >60 ml/min/1.73m2    Calcium 9.6 8.3 - 10.4 MG/DL    Bilirubin, total 0.2 0.2 - 1.1 MG/DL    ALT (SGPT) 16 12 - 65 U/L    AST (SGOT) 47 (H) 15 - 37 U/L    Alk. phosphatase 50 50 - 136 U/L    Protein, total 7.2 6.3 - 8.2 g/dL    Albumin 1.8 (L) 3.5 - 5.0 g/dL    Globulin 5.4 (H) 2.3 - 3.5 g/dL    A-G Ratio 0.3 (L) 1.2 - 3.5     MAGNESIUM    Collection Time: 05/14/18  4:06 AM   Result Value Ref Range    Magnesium 2.3 1.8 - 2.4 mg/dL     ECHOCARDIOGRAM:  Echo: Left ventricle: Systolic function was at the lower limits of normal.  Ejection fraction was estimated in the range of 50 % to 55 %. There were no regional wall motion abnormalities.    -  Atrial septum: Agitated saline contrast injection (bubble study) was performed.  There was no right-to-left shunt, with provocative maneuvers to increase right atrial pressure. -  Mitral valve: There was mild regurgitation. -  Tricuspid valve: There was mild regurgitation. -  Pericardium: A small pericardial effusion was identified posterior to the heart. There was no evidence of hemodynamic compromise         Abs CD4 [place] 25 (L) 359 - 1519 /uL Final      % CD 4 Pos Lymph 4.1 (L) 30.8 - 58.5 % Final       5/2/18:  CXR: Tornillo Blade is no developing focal infiltrate.  There are no effusions.  The heart size is normal.  The bony thorax is intact.    IMPRESSION: No acute findings in the chest      5/8/18:  CXR: The cardiac and mediastinal silhouette are normal in size and configuration. Moderate patchy bilateral airspace opacities are present. There are no pleural effusions. Impression: Patchy bilateral airspace opacities could represent pulmonary edema or pneumonia.      5/8:  CT CHEST :Heart size normal. No pericardial effusion or pleural effusion. Limited evaluation of the noncontrast upper abdomen shows no acute abnormality. No acute or aggressive osseous lesion. No pathologic adenopathy or pneumothorax. Diffuse perihilar groundglass pulmonary infiltrates. Presence of the infiltrates limits full evaluation for small lesion. Impression:  Diffuse perihilar pulmonary infiltrates. FINDINGS are nonspecific and may represent atypical appearance of edema, infection, other alveolar process such as can be seen with pulmonary hemorrhage.      5/9:  CXR:  Fluffy opacities blurring the vascular borders and abdominal perihilar distribution worsened from the reference study. Cardiac silhouette is felt to be upper normal in size. IMPRESSION: Worsening opacities in the lungs may be edema.  Diffuse haziness over both lungs similar to the prior exam without  definite change. Relative sparing the extreme apices and extreme lung bases. Costophrenic angles are sharp.  Heart size normal.   IMPRESSION:  Diffuse edema-like pattern throughout both lungs some yesterday's exam.        5/10:  CXR:  Diffuse haziness over both lungs similar to the prior exam without definite change. Relative sparing the extreme apices and extreme lung bases. Costophrenic angles are sharp. Heart size normal.   IMPRESSION:  Diffuse edema-like pattern throughout both lungs some yesterday's exam.       5/11:  CXR:  Cardiac silhouette and pulmonary vascularity are obscured by hazy opacity in both lungs with air bronchograms. IMPRESSION: Findings suggest pulmonary edema, overall no significant interval change.     5/10:  LD: 618  5/10:  APTT:  111.9  5/10 urine GC/chlamydia: negative  5/10 HIV screen: Reactive                        HIV Ab: positive                        VL: 1,090,000                        CD4 25 / 4.1%  5/10 RPR: NR  5/10 HCV Ab: 0.1  5/10 HepB sAb: 120.19  5/10 sputum: NRF  5/9 sputum: NRF  5/12:  Fungitell:  >500  Influenza A and B:  Negative  5/13:  Sputum gram stain:  5-50 WBCs/OIF, moderate gram negative rods, few Gram negative Cocci, moderate gram positive rods, rare yeast, +1 mucus. Culture: Moderate normal respiratory [place]    Assessment/Plan:   Cough  Respiratory failure with Hypoxia  Leukocytosis:  resolved  Tachycardia  Sepsis  Fever  New onset atrial fib with RVR:  Rate now controlled  Recreational drug use  HIV+ new diagnosis  PCP (pneumocystis carinii pneumonia)  Thrush  Allergic rhinitis  History of syphilis:  RPR negative  Thrush  Anemia    PLAN:   Increase lopressor to 75 mg bid, continue cardizem   proair per Pulmonary   Continues TMP-SMX (EOT: thru 5/31)  Discontinue ceftriaxone, zithromax per ID  Per ID to begin prophylactic dose TMP-SMX after EOT acute phase per ID  Continue eliquis  Prednisone per ID  Continues to require oxygen 4 liters, pulmonary weaning and most likely need home oxygen, RT to assess  Appreciate all input from Cardiology, Pulmonary and ID  To follow up with Pulmonary in office with CXR in one month.   They are arranging nebs at at home    Care Plan discussed with:   MD, RN, Patient, Chandu Linton, patient aunt with his permission.     Signed By: Torito Sorenson NP     May 14, 2018

## 2018-05-15 ENCOUNTER — HOME HEALTH ADMISSION (OUTPATIENT)
Dept: HOME HEALTH SERVICES | Facility: HOME HEALTH | Age: 28
End: 2018-05-15
Payer: COMMERCIAL

## 2018-05-15 VITALS
OXYGEN SATURATION: 99 % | SYSTOLIC BLOOD PRESSURE: 112 MMHG | BODY MASS INDEX: 18.87 KG/M2 | TEMPERATURE: 97.6 F | DIASTOLIC BLOOD PRESSURE: 67 MMHG | HEART RATE: 105 BPM | WEIGHT: 147 LBS | RESPIRATION RATE: 20 BRPM | HEIGHT: 74 IN

## 2018-05-15 LAB
ANION GAP SERPL CALC-SCNC: 8 MMOL/L (ref 7–16)
BUN SERPL-MCNC: 21 MG/DL (ref 6–23)
CALCIUM SERPL-MCNC: 10.5 MG/DL (ref 8.3–10.4)
CHLORIDE SERPL-SCNC: 100 MMOL/L (ref 98–107)
CO2 SERPL-SCNC: 26 MMOL/L (ref 21–32)
CREAT SERPL-MCNC: 1.08 MG/DL (ref 0.8–1.5)
CRYPTOC AG SER QL LA: NEGATIVE
ERYTHROCYTE [DISTWIDTH] IN BLOOD BY AUTOMATED COUNT: 12.5 % (ref 11.9–14.6)
GLUCOSE SERPL-MCNC: 111 MG/DL (ref 65–100)
HCT VFR BLD AUTO: 37.2 % (ref 41.1–50.3)
HGB BLD-MCNC: 12.9 G/DL (ref 13.6–17.2)
MCH RBC QN AUTO: 31.6 PG (ref 26.1–32.9)
MCHC RBC AUTO-ENTMCNC: 34.7 G/DL (ref 31.4–35)
MCV RBC AUTO: 91.2 FL (ref 79.6–97.8)
P JIROVECII AG SPEC QL IF: NEGATIVE
PLATELET # BLD AUTO: 717 K/UL (ref 150–450)
PMV BLD AUTO: 10.3 FL (ref 10.8–14.1)
POTASSIUM SERPL-SCNC: 4.7 MMOL/L (ref 3.5–5.1)
RBC # BLD AUTO: 4.08 M/UL (ref 4.23–5.67)
SODIUM SERPL-SCNC: 134 MMOL/L (ref 136–145)
SPECIMEN SOURCE: NORMAL
WBC # BLD AUTO: 9.5 K/UL (ref 4.3–11.1)

## 2018-05-15 PROCEDURE — 36415 COLL VENOUS BLD VENIPUNCTURE: CPT | Performed by: NURSE PRACTITIONER

## 2018-05-15 PROCEDURE — 77030012341 HC CHMB SPCR OPTC MDI VYRM -A

## 2018-05-15 PROCEDURE — 86480 TB TEST CELL IMMUN MEASURE: CPT | Performed by: NURSE PRACTITIONER

## 2018-05-15 PROCEDURE — 86777 TOXOPLASMA ANTIBODY: CPT | Performed by: NURSE PRACTITIONER

## 2018-05-15 PROCEDURE — 94760 N-INVAS EAR/PLS OXIMETRY 1: CPT

## 2018-05-15 PROCEDURE — 74011636637 HC RX REV CODE- 636/637: Performed by: INTERNAL MEDICINE

## 2018-05-15 PROCEDURE — 94640 AIRWAY INHALATION TREATMENT: CPT

## 2018-05-15 PROCEDURE — 80048 BASIC METABOLIC PNL TOTAL CA: CPT | Performed by: NURSE PRACTITIONER

## 2018-05-15 PROCEDURE — 74011250637 HC RX REV CODE- 250/637: Performed by: NURSE PRACTITIONER

## 2018-05-15 PROCEDURE — 74011250637 HC RX REV CODE- 250/637: Performed by: INTERNAL MEDICINE

## 2018-05-15 PROCEDURE — 77010033678 HC OXYGEN DAILY

## 2018-05-15 PROCEDURE — 87327 CRYPTOCOCCUS NEOFORM AG IA: CPT | Performed by: NURSE PRACTITIONER

## 2018-05-15 PROCEDURE — 99232 SBSQ HOSP IP/OBS MODERATE 35: CPT | Performed by: INTERNAL MEDICINE

## 2018-05-15 PROCEDURE — 85027 COMPLETE CBC AUTOMATED: CPT | Performed by: NURSE PRACTITIONER

## 2018-05-15 RX ORDER — METOPROLOL TARTRATE 50 MG/1
50 TABLET ORAL EVERY 12 HOURS
Status: DISCONTINUED | OUTPATIENT
Start: 2018-05-15 | End: 2018-05-15

## 2018-05-15 RX ORDER — PREDNISONE 20 MG/1
20 TABLET ORAL
Qty: 14 TAB | Refills: 0 | Status: SHIPPED | OUTPATIENT
Start: 2018-05-20 | End: 2018-05-22 | Stop reason: ALTCHOICE

## 2018-05-15 RX ORDER — PREDNISONE 20 MG/1
40 TABLET ORAL
Qty: 10 TAB | Refills: 0 | Status: SHIPPED | OUTPATIENT
Start: 2018-05-16 | End: 2018-05-21

## 2018-05-15 RX ORDER — HYDROCODONE BITARTRATE AND HOMATROPINE METHYLBROMIDE 1.5; 5 MG/5ML; MG/5ML
5 SYRUP ORAL
Qty: 100 ML | Refills: 0 | Status: SHIPPED | OUTPATIENT
Start: 2018-05-15 | End: 2018-05-20

## 2018-05-15 RX ORDER — AZITHROMYCIN 600 MG/1
1200 TABLET, FILM COATED ORAL
Qty: 4 TAB | Refills: 0 | Status: SHIPPED | OUTPATIENT
Start: 2018-05-15 | End: 2018-06-01

## 2018-05-15 RX ORDER — METOPROLOL TARTRATE 75 MG/1
75 TABLET, FILM COATED ORAL EVERY 12 HOURS
Qty: 60 TAB | Refills: 0 | Status: SHIPPED | OUTPATIENT
Start: 2018-05-15 | End: 2018-06-01

## 2018-05-15 RX ORDER — DILTIAZEM HYDROCHLORIDE 180 MG/1
180 CAPSULE, COATED, EXTENDED RELEASE ORAL 2 TIMES DAILY
Qty: 60 CAP | Refills: 0 | Status: SHIPPED | OUTPATIENT
Start: 2018-05-15 | End: 2018-06-01 | Stop reason: SDUPTHER

## 2018-05-15 RX ORDER — AZITHROMYCIN 600 MG/1
1200 TABLET, FILM COATED ORAL
Status: DISCONTINUED | OUTPATIENT
Start: 2018-05-15 | End: 2018-05-15 | Stop reason: HOSPADM

## 2018-05-15 RX ORDER — ALBUTEROL SULFATE 90 UG/1
2 AEROSOL, METERED RESPIRATORY (INHALATION) 3 TIMES DAILY
Qty: 1 INHALER | Refills: 11 | Status: SHIPPED | OUTPATIENT
Start: 2018-05-15 | End: 2018-06-01

## 2018-05-15 RX ORDER — SULFAMETHOXAZOLE AND TRIMETHOPRIM 800; 160 MG/1; MG/1
2 TABLET ORAL EVERY 8 HOURS
Qty: 90 TAB | Refills: 0 | Status: SHIPPED | OUTPATIENT
Start: 2018-05-15 | End: 2018-05-30

## 2018-05-15 RX ADMIN — METOPROLOL TARTRATE 50 MG: 50 TABLET ORAL at 08:23

## 2018-05-15 RX ADMIN — NYSTATIN 500000 UNITS: 100000 SUSPENSION ORAL at 08:21

## 2018-05-15 RX ADMIN — HYDROCODONE BITARTRATE AND HOMATROPINE METHYLBROMIDE 5 ML: 5; 1.5 SOLUTION ORAL at 16:53

## 2018-05-15 RX ADMIN — SULFAMETHOXAZOLE AND TRIMETHOPRIM 2 TABLET: 800; 160 TABLET ORAL at 16:25

## 2018-05-15 RX ADMIN — SULFAMETHOXAZOLE AND TRIMETHOPRIM 2 TABLET: 800; 160 TABLET ORAL at 08:23

## 2018-05-15 RX ADMIN — ALBUTEROL SULFATE 2 PUFF: 90 AEROSOL, METERED RESPIRATORY (INHALATION) at 07:16

## 2018-05-15 RX ADMIN — ALBUTEROL SULFATE 2 PUFF: 90 AEROSOL, METERED RESPIRATORY (INHALATION) at 15:16

## 2018-05-15 RX ADMIN — OXYMETAZOLINE HYDROCHLORIDE 2 SPRAY: 5 SPRAY NASAL at 08:24

## 2018-05-15 RX ADMIN — AZITHROMYCIN MONOHYDRATE 1200 MG: 600 TABLET ORAL at 16:22

## 2018-05-15 RX ADMIN — PREDNISONE 40 MG: 20 TABLET ORAL at 08:24

## 2018-05-15 RX ADMIN — APIXABAN 5 MG: 5 TABLET, FILM COATED ORAL at 08:24

## 2018-05-15 RX ADMIN — DILTIAZEM HYDROCHLORIDE 180 MG: 180 CAPSULE, COATED, EXTENDED RELEASE ORAL at 08:24

## 2018-05-15 RX ADMIN — FAMOTIDINE 20 MG: 20 TABLET ORAL at 08:23

## 2018-05-15 RX ADMIN — LORATADINE 10 MG: 10 TABLET ORAL at 08:22

## 2018-05-15 NOTE — PROGRESS NOTES
Verbal bedside report received from [address] [place] [address]. Patient's situation, background, assessment and recommendations were provided. Kardex, Mar, and recent results also reviewed. Opportunity for questions provided. Assumed care of patient.

## 2018-05-15 NOTE — PROGRESS NOTES
Bedside and Verbal shift change report given to Jazzy Hameed RN (oncoming nurse) by self Creig Peacemaker nurse). Report included the following information SBAR, Kardex, MAR and Recent Results.

## 2018-05-15 NOTE — PROGRESS NOTES
Michelle Perry  Admission Date: 5/8/2018             Daily Progress Note: 5/15/2018    The patient's chart is reviewed and the patient is discussed with the staff. 29 yo male presented with cough, dyspnea and hypoxia. Found to be in a.fib with RVR.  CXR/CT with bilateral infiltrates.  Has tested positive for HIV (new diagnosis). ID has seen and bactrim added for coverage for PJP (presumed).         Subjective:     Feels overall better. Asking for shower chair. Has family support for discharge. Understands about needing oxygen.      Current Facility-Administered Medications   Medication Dose Route Frequency    azithromycin (ZITHROMAX) tablet 1,200 mg  1,200 mg Oral Q7D    metoprolol tartrate (LOPRESSOR) tablet 75 mg  75 mg Oral Q12H    albuterol (PROVENTIL HFA, VENTOLIN HFA, PROAIR HFA) inhaler 2 Puff  2 Puff Inhalation TID RT    loratadine (CLARITIN) tablet 10 mg  10 mg Oral DAILY    apixaban (ELIQUIS) tablet 5 mg  5 mg Oral Q12H    trimethoprim-sulfamethoxazole (BACTRIM DS, SEPTRA DS) 160-800 mg per tablet 2 Tab  2 Tab Oral Q8H    predniSONE (DELTASONE) tablet 40 mg  40 mg Oral DAILY WITH BREAKFAST    Followed by   Julio Cesar Diaz ON 5/20/2018] predniSONE (DELTASONE) tablet 20 mg  20 mg Oral DAILY WITH BREAKFAST    nystatin (MYCOSTATIN) 100,000 unit/mL oral suspension 500,000 Units  500,000 Units Oral QID    dilTIAZem CD (CARDIZEM CD) capsule 180 mg  180 mg Oral BID    famotidine (PEPCID) tablet 20 mg  20 mg Oral BID    sodium chloride (OCEAN) 0.65 % nasal squeeze bottle 2 Spray  2 Spray Both Nostrils Q2H PRN    HYDROcodone-homatropine (HYCODAN) 5-1.5 mg/5 mL (5 mL) syrup 5 mL  5 mL Oral Q6H PRN    acetaminophen (TYLENOL) tablet 650 mg  650 mg Oral Q6H PRN    traMADol (ULTRAM) tablet 50 mg  50 mg Oral Q6H PRN         Objective:     Vitals:    05/15/18 0031 05/15/18 0458 05/15/18 0716 05/15/18 0823   BP: 116/62 128/68  122/87   Pulse: 83 92  (!) 120   Resp: 17 16  20   Temp: 97.5 °F (36.4 °C) 97.4 °F (36.3 °C)  98 °F (36.7 °C)   SpO2: 98% 99% 99% 100%   Weight:  147 lb (66.7 kg)     Height:         Intake and Output:   05/13 1901 - 05/15 0700  In: 2520 [P.O.:2520]  Out: 4000 [Urine:4000]  05/15 0701 - 05/15 1900  In: 480 [P.O.:480]  Out: 650 [Urine:650]    Physical Exam:   Constitutional:  the patient is well developed and in no acute distress  HEENT:  Sclera clear, pupils equal, oral mucosa moist  Lungs: clear bilaterally. Wearing 3 liter cannula. Cardiovascular:  Irregular and without M,G,R. A fib per telemetry  Abd/GI: soft and non-tender; with positive bowel sounds. Ext: warm without cyanosis. There is no lower leg edema. Musculoskeletal: moves all four extremities with equal strength  Skin:  no jaundice or rashes, no wounds   Neuro: no gross neuro deficits   Musculoskeletal: can ambulate. No deformity  Psychiatric: Calm.      ROS:  Improved strength, less dyspnea  Lines: peripheral IV    CHEST XRAY: none today    LAB  Recent Labs      05/14/18   0406  05/13/18   0348   WBC  9.9  13.0*   HGB  10.7*  9.9*   HCT  31.4*  29.8*   PLT  463*  378     Recent Labs      05/14/18   0406  05/13/18   0348   NA  138  139   K  4.8  4.8   CL  103  105   CO2  27  27   GLU  94  90   BUN  20  18   CREA  0.83  0.81   MG  2.3  2.2   ALB  1.8*  1.7*   SGOT  47*  54*         Assessment:  (Medical Decision Making)     Hospital Problems  Date Reviewed: 5/12/2018          Codes Class Noted POA    * (Principal)PCP (pneumocystis carinii pneumonia) ([facility] ICD-10-CM: B59  ICD-9-CM: 136.3  5/10/2018 Yes    presumed    HIV positive ([facility] ICD-10-CM: Z21  ICD-9-CM: V08  5/10/2018 Yes    New diagnosis    Respiratory failure with hypoxia ([facility] ICD-10-CM: J96.91  ICD-9-CM: 518.81  5/10/2018 Yes    Weaned down to 3 liters but will need home oxygen    Sepsis ([facility] ICD-10-CM: A41.9  ICD-9-CM: 038.9, 995.91  5/10/2018 Yes    Secondary to pneumonia    Hypoxia ICD-10-CM: R09.02  ICD-9-CM: 799.02  5/9/2018 Yes    As above    New onset a-fib [facility]) ICD-10-CM: I48.91  ICD-9-CM: 427.31  5/8/2018 Yes    Still with some tachycardia          Plan:  (Medical Decision Making)   1. Home today - bactrim/prednisone per ID  2. Serum studies ordered by ID today to assess for opportunistic infections - AFB, cryptococcus, quantiferon TB, and toxoplasma studies. We obtained a PJP DFA yesterday. 3. Home with oxygen - 3 liters. Follow up with us in about a month to reassess oxygen needs  4. Follow up with cardiology in about 2 weeks for a fib. Lopressor increased for better rate control. On anticoagulation  5.  assisting with equipment    Germaine Oneal NP    More than 50% of time documented was spent face-to-face contact with the patient and in the care of the patient on the floor/unit where the patient is located. I have spoken with and examined the patient. I agree with the above assessment and plan as documented. Gen: pleasant, no distress  Lungs:  Crackles anteriorly and posteriorly  Heart: tachy, irreg  Ext: no edema    --will complete bactrim/prednisone course 5/31  --follow up in our office in one month for ambulating oximetry and asthma assessment.   --f/u for HIV arranged by ID    Luis Agosto MD

## 2018-05-15 NOTE — PROGRESS NOTES
Discharge instructions reviewed with patient. Prescriptions given for albuterol, eliquis, azithromycin, cardizem, lopressor, prednisone, trimethoprim-sulfamethoxazole, and hycodan  med info sheets provided for all new medications. Opportunity for questions provided. Patient voiced understanding of all discharge instructions. IV(s) and heart monitor removed by primary RN.

## 2018-05-15 NOTE — PROGRESS NOTES
[facility]  Face to Face Encounter    Patients Name: Neetu Reynolds    YOB: 1990    Ordering Physician: Dixie Kruse MD     Primary Diagnosis: Pneumonia  New onset a-fib [facility])    Date of Face to Face:   5/15/2018                                  Face to Face Encounter findings are related to primary reason for home care:   yes. 1. I certify that the patient needs intermittent care as follows: skilled nursing care:  skilled observation/assessment, patient education  physical therapy: strengthening    2. I certify that this patient is homebound, that is: Patient's condition makes leaving the home medically contraindicated; and 3) patient has a normal inability to leave the home and leaving the home requires considerable and taxing effort. Patient may leave the home for infrequent and short duration for medical reasons, and occasional absences for non-medical reasons. Homebound status is due to the following functional limitations:     3. I certify that this patient is under my care and that I, or a nurse practitioner or  [id number], or clinical nurse specialist, or certified nurse midwife, working with me, had a Face-to-Face Encounter that meets the physician Face-to-Face Encounter requirements. The following are the clinical findings from the [address] [place] [address] encounter that support the need for skilled services and is a summary of the encounter:     See Progress notes       Jewels Poole RN  5/15/2018      THE FOLLOWING TO BE COMPLETED BY THE COMMUNITY PHYSICIAN:    I concur with the findings described above from the [facility] encounter that this patient is homebound and in need of a skilled service.     Certifying Physician: _____________________________________      Printed Certifying Physician Name: _____________________________________    Date: _________________

## 2018-05-15 NOTE — PROGRESS NOTES
MEWS score noted to be 3. Charge nurse, Kiran Jones RN informed and assessed patient. High MEWS score noted due to high heart rate. AM Meds given to assist. Vitals signs to be completed every 2 hours. Will continue to monitor.

## 2018-05-15 NOTE — DISCHARGE INSTRUCTIONS
Home O2 3LNC  Pneumonia: Care Instructions  Your Care Instructions    Pneumonia is an infection of the lungs. Most cases are caused by infections from bacteria or viruses. Pneumonia may be mild or very severe. If it is caused by bacteria, you will be treated with antibiotics. It may take a few weeks to a few months to recover fully from pneumonia, depending on how sick you were and whether your overall health is good. Follow-up care is a key part of your treatment and safety. Be sure to make and go to all appointments, and call your doctor if you are having problems. It's also a good idea to know your test results and keep a list of the medicines you take. How can you care for yourself at home? · Take your antibiotics exactly as directed. Do not stop taking the medicine just because you are feeling better. You need to take the full course of antibiotics. · Take your medicines exactly as prescribed. Call your doctor if you think you are having a problem with your medicine. · Get plenty of rest and sleep. You may feel weak and tired for a while, but your energy level will improve with time. · To prevent dehydration, drink plenty of fluids, enough so that your urine is light yellow or clear like water. Choose water and other caffeine-free clear liquids until you feel better. If you have kidney, heart, or liver disease and have to limit fluids, talk with your doctor before you increase the amount of fluids you drink. · Take care of your cough so you can rest. A cough that brings up mucus from your lungs is common with pneumonia. It is one way your body gets rid of the infection. But if coughing keeps you from resting or causes severe fatigue and chest-wall pain, talk to your doctor. He or she may suggest that you take a medicine to reduce the cough. · Use a vaporizer or humidifier to add moisture to your bedroom. Follow the directions for cleaning the machine.   · Do not smoke or allow others to smoke around you. Smoke will make your cough last longer. If you need help quitting, talk to your doctor about stop-smoking programs and medicines. These can increase your chances of quitting for good. · Take an over-the-counter pain medicine, such as acetaminophen (Tylenol), ibuprofen (Advil, Motrin), or naproxen (Aleve). Read and follow all instructions on the label. · Do not take two or more pain medicines at the same time unless the doctor told you to. Many pain medicines have acetaminophen, which is Tylenol. Too much acetaminophen (Tylenol) can be harmful. · If you were given a spirometer to measure how well your lungs are working, use it as instructed. This can help your doctor tell how your recovery is going. · To prevent pneumonia in the future, talk to your doctor about getting a flu vaccine (once a year) and a pneumococcal vaccine (one time only for most people). When should you call for help? Call 911 anytime you think you may need emergency care. For example, call if:  ? · You have severe trouble breathing. ?Call your doctor now or seek immediate medical care if:  ? · You cough up dark brown or bloody mucus (sputum). ? · You have new or worse trouble breathing. ? · You are dizzy or lightheaded, or you feel like you may faint. ? Watch closely for changes in your health, and be sure to contact your doctor if:  ? · You have a new or higher fever. ? · You are coughing more deeply or more often. ? · You are not getting better after 2 days (48 hours). ? · You do not get better as expected. Where can you learn more? Go to http://hope-obinna.info/. Enter [id number] in the search box to learn more about \"Pneumonia: Care Instructions. \"  Current as of: May 12, 2017  Content Version: 11.4  © 0942-5133 [employer], [employer]. Care instructions adapted under license by [employer] (which disclaims liability or warranty for this information).  If you have questions about a medical condition or this instruction, always ask your healthcare professional. [place] [address] any warranty or liability for your use of this information.

## 2018-05-15 NOTE — DISCHARGE SUMMARY
Hospitalist Discharge Summary     Admit Date:  2018  2:39 PM   Name:  Severa Hoist   Age:  28 y.o.  :  1990   MRN:  [id number]   PCP:  Karla Matt MD  Treatment Team: Attending Provider: Sarina Rebollar MD; Consulting Provider: Mercedes Adams MD; Consulting Provider: Teodoro Dupont MD; Consulting Provider: Seth Joseph MD; Utilization Review: Kika Bradshaw RN; Care Manager: Manolo Raymundo RN    Problem List for this Hospitalization:  Hospital Problems as of 5/15/2018  Date Reviewed: 2018          Codes Class Noted - Resolved POA    * (Principal)PCP (pneumocystis carinii pneumonia) ([facility] ICD-10-CM: B59  ICD-9-CM: 136.3  5/10/2018 - Present Yes        HIV positive ([facility] ICD-10-CM: Z21  ICD-9-CM: V08  5/10/2018 - Present Yes        Respiratory failure with hypoxia ([facility] ICD-10-CM: J96.91  ICD-9-CM: 518.81  5/10/2018 - Present Yes        Sepsis ([facility] ICD-10-CM: A41.9  ICD-9-CM: 038.9, 995.91  5/10/2018 - Present Yes        Hypoxia ICD-10-CM: R09.02  ICD-9-CM: 799.02  2018 - Present Yes        New onset a-fib [facility]) ICD-10-CM: I48.91  ICD-9-CM: 427.31  2018 - Present Yes                Admission HPI from 2018: This is a 74-year-old male patient who has a past medical history of allergic rhinitis and asthma as a child, who came into the emergency room with a chief complaint of cough and severe shortness of breath. According to the patient, since the last week of 2018, he started having progressive shortness of breath and cough. He thought that it was an episode of asthma exacerbation secondary to the pollen in the environment. He visited an urgent care center and over there he was treated with a steroid taper and a Z-Tung. He was referred to a primary care office and he saw Dr. Connie Coffman on 2018. At that time, the plan was to continue with the prednisone taper and to monitor his clinical progress.   According to the patient, he persisted having worsening shortness of breath and he saw Dr. Chhaya Boo again on 05/01/2018. At that time, a chest x-ray was ordered due to persistence of symptoms. The chest x-ray done on 05/02/2018 did not show any evidence of any lung infiltrate. Unfortunately, the patient persisted having worsening symptoms. He says that he has lost his appetite, that he has had persistent and productive cough. When he wakes up in the morning his sputum is greenish, but during the day it clears up. He also complains of some episodes of chills and night sweats. The patient also reports wheezing mainly at night, nausea and several episodes of vomiting, the last one this morning. Today, he called Dr. Chhaya Boo and he advised the patient to come into the emergency room. He activated the EMS and he was brought into the emergency room of [facility].  When he arrived here, his vital signs were blood pressure of 113/76, heart rate of 163, respiratory rate of 20, O2 saturation of 96. He had an irregularly irregular rate and rhythm. He had decreased breath sounds in both lung bases and his abdomen was soft and nontender. An EKG was done and showed atrial fibrillation with rapid ventricular response. His initial blood workup reported lactic acid of 1.0, a white blood cell count of 14, normal kidney function, procalcitonin of 0.6, and a BNP of 16. Chest x-ray was done and showed patchy bilateral airspace opacities, which could represent pulmonary edema or pneumonia. The patient received IV ceftriaxone and Zithromax in the emergency room. He also received 1 liter of normal saline and he was treated with 2 boluses of IV Cardizem and he was started on a Cardizem drip. He was presented to the hospitalist team to be admitted. Hospital Course:    Patient Hypoxic and required O2 therapy. UDS positive for THC, PCP, and Opiates. Pulmonary consulted. 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 Patient requiring 3LNC at discharge. Received antibiotics and steroids. Patient new diagnosis HIV positive. ID consulted. Serum studies ordered. CD4 25, VL>1,000,000. To follow up as OP for initiation of  ART. Cardiology consulted for Afib. Patient treated with cardizem, lopressor, and eliquis. Echo EF 50-55%. Follow up instructions and discharge meds at bottom of this note. Plan was discussed with patient. All questions answered. Patient was stable at time of discharge. Diagnostic Imaging/Tests:   Xr Chest Sngl V    Result Date: 5/9/2018  Single portable upright chest x-ray May 9, 2018 Reference exam: May 8, 2018 INDICATION: Pneumonia FINDINGS: Fluffy opacities blurring the vascular borders and abdominal perihilar distribution worsened from the reference study. Cardiac silhouette is felt to be upper normal in size. IMPRESSION: Worsening opacities in the lungs may be edema. Ct Chest Wo Cont    Result Date: 5/8/2018  CT Chest without contrast Indication:  Pneumonia versus edema, shortness of breath and midsternal chest pain with cough Comparison:  Chest x-ray 5/8/2018 Protocol:  Contiguous 5 mm axial images were obtained from the neck base through the upper abdomen without intravenous contrast. Radiation dose reduction techniques were used for this study:  Our CT scanners use one or all of the following: Automated exposure control, adjustment of the mA and/or kVp according to patient's size, iterative reconstruction. Findings:  Heart size normal. No pericardial effusion or pleural effusion. Limited evaluation of the noncontrast upper abdomen shows no acute abnormality. No acute or aggressive osseous lesion. No pathologic adenopathy or pneumothorax. Diffuse perihilar groundglass pulmonary infiltrates. Presence of the infiltrates limits full evaluation for small lesion. Impression: 1. Diffuse perihilar pulmonary infiltrates.  FINDINGS are nonspecific and may represent atypical appearance of edema, infection, other alveolar process such as can be seen with pulmonary hemorrhage. Xr Chest Port    Result Date: 2018  Portable chest: History: Chronic chest pain and cough Comparison: 2018 Findings: A single view of the chest was obtained at 1457 hours. . The cardiac and mediastinal silhouette are normal in size and configuration. Moderate patchy bilateral airspace opacities are present. There are no pleural effusions. Impression: Patchy bilateral airspace opacities could represent pulmonary edema or pneumonia. Echocardiogram results:  Results for orders placed or performed during the hospital encounter of 18   2D ECHO COMPLETE ADULT (TTE) [address]  [address] [facility], [address] [facility]  [phone]    Transthoracic Echocardiogram  2D, M-mode, Doppler, and Color Doppler    Patient: Anirudh Gorman  MR #: [id number]  : 1990  Age: 28 years  Gender: Male  Study date: 09-May-2018  Account #: [de-identified]  Height: 74 in  Weight: 159.7 lb  BSA: 1.98 mï¾²  Status:Routine  Location: ER05  BP: 113/ 70    Allergies: NO KNOWN ALLERGIES    Sonographer:  Tess Ku RDCS  Group:  [facility]  Referring Physician:  Belinda Lopez MD  Reading Physician:  Yakov Haynes MD [facility]    INDICATIONS: New onset afib    PROCEDURE: This was a routine study. A transthoracic echocardiogram was  performed. The study included complete 2D imaging, M-mode, complete spectral  Doppler, and color Doppler. Intravenous contrast (agitated saline, 9 ml) was  administered to evaluate possible R-L intracardiac shunting. Image quality   was  adequate. LEFT VENTRICLE: Size was normal. Systolic function was at the lower limits of  normal. Ejection fraction was estimated in the range of 50 % to 55 %. There  were no regional wall motion abnormalities.  Wall thickness was normal. The  study was not technically sufficient to allow evaluation of LV diastolic  function due to the presence of atrial fibrillation. RIGHT VENTRICLE: The size was normal. Systolic function was normal. Estimated  peak pressure was in the range of 30-35 mmHg. LEFT ATRIUM: Size was normal.    ATRIAL SEPTUM: Agitated saline contrast injection (bubble study) was   performed. There was no right-to-left shunt, with provocative maneuvers to increase   right  atrial pressure. RIGHT ATRIUM: Size was normal.    SYSTEMIC VEINS: IVC: The inferior vena cava was normal in size and course. AORTIC VALVE: The valve was trileaflet. There was no evidence for stenosis. There was no insufficiency. MITRAL VALVE: Valve structure was normal. There was no evidence for stenosis. There was mild regurgitation. TRICUSPID VALVE: The valve structure was normal. There was no evidence for  stenosis. There was mild regurgitation. PULMONIC VALVE: Not well visualized. There was no evidence for stenosis. There  was trivial regurgitation. PERICARDIUM: A small pericardial effusion was identified posterior to the  heart. There was no evidence of hemodynamic compromise. AORTA: The root exhibited normal size. SUMMARY:    -  Left ventricle: Systolic function was at the lower limits of normal.  Ejection fraction was estimated in the range of 50 % to 55 %. There were no  regional wall motion abnormalities. -  Atrial septum: Agitated saline contrast injection (bubble study) was  performed. There was no right-to-left shunt, with provocative maneuvers to  increase right atrial pressure. -  Mitral valve: There was mild regurgitation.    -  Tricuspid valve: There was mild regurgitation.    -  Pericardium: A small pericardial effusion was identified posterior to the  heart. There was no evidence of hemodynamic compromise.     SYSTEM MEASUREMENT TABLES    2D mode  AoR Diam (2D): 3.2 cm  LA Dimension (2D): 2.4 cm  Left Atrium Systolic Volume Index; Method of Disks, Biplane; 2D mode;: 20.7  ml/m2  IVS/LVPW (2D): 1  IVSd (2D): 0.9 cm  LVIDd (2D): 4.6 cm  LVIDs (2D): 3.5 cm  LVOT Area (2D): 3.1 cm2  LVPWd (2D): 1 cm    Unspecified Scan Mode  LVOT Diam: 2 cm    Prepared and signed by    Belkis Ramirez MD [facility]  Signed [id number] 16:20:57           All Micro Results     Procedure Component Value Units Date/Time    PNEUMOCYSTIS JIROVECI - DFA [[id number]] Collected:  05/14/18 1608    Order Status:  Completed Specimen:  Miscellaneous sample Updated:  05/15/18 1538     Source SPUTUM        Pneumocystis Smear, DFA NEGATIVE          (NOTE)  Performed At: [address] [id number]  Barry Foster MD AW:[id number]         CRYPTOCOCCUS [facility] TITER [[id number]] Collected:  05/15/18 1143    Order Status:  Completed Specimen:  Serum from Serum Updated:  05/15/18 1406     Cryptococcus Ag NEGATIVE        QUANTIFERON TB GOLD [[id number]] Collected:  05/15/18 1143    Order Status:  Completed Specimen:  Whole Blood from Blood Updated:  05/15/18 1151    AFB CULTURE + SMEAR W/RFLX ID FROM CULTURE [[id number]]     Order Status:  Sent     CULTURE, RESPIRATORY/SPUTUM/BRONCH W Nirmal Villalta [[id number]] Collected:  05/10/18 2022    Order Status:  Completed Specimen:  Sputum from Sputum Updated:  05/13/18 0713     Special Requests: NO SPECIAL REQUESTS        GRAM STAIN  5 TO 50 WBC'S/OIF      0 TO 5  EPITHELIAL CELLS SEEN /OIF              MODERATE GRAM NEGATIVE RODS      FEW GRAM NEGATIVE COCCI                 MODERATE GRAM POSITIVE RODS      RARE YEAST         1 + MUCUS     Culture result:         MODERATE NORMAL RESPIRATORY KODY    CULTURE, BLOOD [[id number]] Collected:  05/08/18 1547    Order Status:  Completed Specimen:  Whole Blood from Blood Updated:  05/13/18 0616     Special Requests: LEFT FOREARM        Culture result: NO GROWTH 5 DAYS       CULTURE, BLOOD [[id number]] Collected:  05/08/18 1547    Order Status:  Completed Specimen:  Whole Blood from Blood Updated: 05/13/18 0616     Special Requests: --        RIGHT  HAND       Culture result: NO GROWTH 5 DAYS       CULTURE, RESPIRATORY/SPUTUM/BRONCH Erich [place] STAIN [[id number]] Collected:  05/09/18 1131    Order Status:  Completed Specimen:  Sputum Updated:  05/11/18 0744     Special Requests: NO SPECIAL REQUESTS        GRAM STAIN 0 TO 30 WBC'S/OIF      0 TO 4 EPITHELIAL CELLS SEEN /OIF              MODERATE GRAM NEGATIVE RODS      FEW GRAM POSITIVE RODS         FEW YEAST         1 + MUCUS     Culture result:         MODERATE NORMAL RESPIRATORY KODY    INFLUENZA A & B AG (RAPID TEST) [[id number]] Collected:  05/08/18 1731    Order Status:  Completed Specimen:  Nasopharyngeal from Nasal washing Updated:  05/08/18 1750     Influenza A Ag NEGATIVE          NEGATIVE FOR THE PRESENCE OF INFLUENZA A ANTIGEN  INFECTION DUE TO INFLUENZA A CANNOT BE RULED OUT. BECAUSE THE ANTIGEN PRESENT IN THE SAMPLE MAY BE BELOW  THE DETECTION LIMIT OF THE TEST. A NEGATIVE TEST IS PRESUMPTIVE AND IT IS RECOMMENDED THAT THESE RESULTS BE CONFIRMED BY VIRAL CULTURE OR AN FDA-CLEARED INFLUENZA A AND B MOLECULAR ASSAY. Influenza B Ag NEGATIVE          NEGATIVE FOR THE PRESENCE OF INFLUENZA B ANTIGEN  INFECTION DUE TO INFLUENZA B CANNOT BE RULED OUT. BECAUSE THE ANTIGEN PRESENT IN THE SAMPLE MAY BE BELOW  THE DETECTION LIMIT OF THE TEST. A NEGATIVE TEST IS PRESUMPTIVE AND IT IS RECOMMENDED THAT THESE RESULTS BE CONFIRMED BY VIRAL CULTURE OR AN FDA-CLEARED INFLUENZA A AND B MOLECULAR ASSAY.                Labs: Results:       BMP, Mg, Phos Recent Labs      05/15/18   1143  05/14/18   0406  05/13/18   0348   NA  134*  138  139   K  4.7  4.8  4.8   CL  100  103  105   CO2  26  27  27   AGAP  8  8  7   BUN  21  20  18   CREA  1.08  0.83  0.81   CA  10.5*  9.6  9.4   GLU  111*  94  90   MG   --   2.3  2.2      CBC Recent Labs      05/15/18   1143  05/14/18   0406  05/13/18   0348   WBC  9.5  9.9  13.0*   RBC  4.08*  3.45*  3.22*   HGB  12.9*  10.7* 9.9*   HCT  37.2*  31.4*  29.8*   PLT  717*  463*  378   GRANS   --   92*  93*   LYMPH   --   5*  3*   EOS   --   0*  0*   MONOS   --   2*  3*   BASOS   --   0  0   IG   --   1  1   ANEU   --   9.1*  12.2*   ABL   --   0.5  0.4*   NUNU   --   0.0  0.0   ABM   --   0.2  0.4   ABB   --   0.0  0.0   AIG   --   0.1  0.1      LFT Recent Labs      05/14/18   0406  05/13/18   0348   SGOT  47*  54*   ALT  16  13   AP  50  46*   TP  7.2  6.8   ALB  1.8*  1.7*   GLOB  5.4*  5.1*   AGRAT  0.3*  0.3*      Cardiac Testing Lab Results   Component Value Date/Time    BNP 7 05/09/2018 04:53 AM    BNP 16 05/08/2018 02:45 PM    CK 94 05/09/2018 04:53 AM    CK - MB <0.5 (L) 05/09/2018 04:53 AM    CK-MB Index Cannot be calculated 05/09/2018 04:53 AM    Troponin-I, Qt. <0.02 (L) 05/09/2018 04:53 AM      Coagulation Tests Lab Results   Component Value Date/Time    Prothrombin time 16.1 (H) 05/08/2018 02:45 PM    INR 1.3 05/08/2018 02:45 PM    aPTT 38.0 (H) 05/12/2018 04:13 AM    aPTT 116.6 (HH) 05/11/2018 04:00 AM    aPTT 111.9 () 05/10/2018 12:07 PM      A1c No results found for: HBA1C, HGBE8, ZCA9OXRG   Lipid Panel No results found for: CHOL, CHOLPOCT, CHOLX, CHLST, CHOLV, 236224, HDL, LDL, LDLC, DLDLP, 226950, VLDLC, VLDL, TGLX, TRIGL, TRIGP, TGLPOCT, CHHD, [facility]   Thyroid Panel Lab Results   Component Value Date/Time    TSH 0.639 05/08/2018 02:45 PM        Most Recent UA Lab Results   Component Value Date/Time    Color YELLOW 05/09/2018 11:38 AM    Appearance HAZY 05/09/2018 11:38 AM    Specific gravity 1.037 (H) 05/09/2018 11:38 AM    pH (UA) 6.0 05/09/2018 11:38 AM    Protein 100 (A) 05/09/2018 11:38 AM    Glucose NEGATIVE  05/09/2018 11:38 AM    Ketone TRACE (A) 05/09/2018 11:38 AM    Bilirubin SMALL (A) 05/09/2018 11:38 AM    Blood NEGATIVE  05/09/2018 11:38 AM    Urobilinogen 1.0 05/09/2018 11:38 AM    Nitrites NEGATIVE  05/09/2018 11:38 AM    Leukocyte Esterase NEGATIVE  05/09/2018 11:38 AM        No Known Allergies    There is no immunization history on file for this patient.     All Labs from Last 24 Hrs:  Recent Results (from the past 24 hour(s))   PNEUMOCYSTIS JIROVECI - DFA    Collection Time: 05/14/18  4:08 PM   Result Value Ref Range    Source SPUTUM      Pneumocystis Smear, DFA NEGATIVE  NEGATIVE     CRYPTOCOCCUS AG W/REFLEX TITER    Collection Time: 05/15/18 11:43 AM   Result Value Ref Range    Cryptococcus Ag NEGATIVE  NEG     CBC W/O DIFF    Collection Time: 05/15/18 11:43 AM   Result Value Ref Range    WBC 9.5 4.3 - 11.1 K/uL    RBC 4.08 (L) 4.23 - 5.67 M/uL    HGB 12.9 (L) 13.6 - 17.2 g/dL    HCT 37.2 (L) 41.1 - 50.3 %    MCV 91.2 79.6 - 97.8 FL    MCH 31.6 26.1 - 32.9 PG    MCHC 34.7 31.4 - 35.0 g/dL    RDW 12.5 11.9 - 14.6 %    PLATELET 494 (H) 448 - 450 K/uL    MPV 10.3 (L) 10.8 - 08.6 FL   METABOLIC PANEL, BASIC    Collection Time: 05/15/18 11:43 AM   Result Value Ref Range    Sodium 134 (L) 136 - 145 mmol/L    Potassium 4.7 3.5 - 5.1 mmol/L    Chloride 100 98 - 107 mmol/L    CO2 26 21 - 32 mmol/L    Anion gap 8 7 - 16 mmol/L    Glucose 111 (H) 65 - 100 mg/dL    BUN 21 6 - 23 MG/DL    Creatinine 1.08 0.8 - 1.5 MG/DL    GFR est AA >60 >60 ml/min/1.73m2    GFR est non-AA >60 >60 ml/min/1.73m2    Calcium 10.5 (H) 8.3 - 10.4 MG/DL       Discharge Exam:  Patient Vitals for the past 24 hrs:   Temp Pulse Resp BP SpO2   05/15/18 1516 - - - - 99 %   05/15/18 1303 97.6 °F (36.4 °C) (!) 105 20 112/67 92 %   05/15/18 0823 98 °F (36.7 °C) (!) 120 20 122/87 100 %   05/15/18 0716 - - - - 99 %   05/15/18 0458 97.4 °F (36.3 °C) 92 16 128/68 99 %   05/15/18 0031 97.5 °F (36.4 °C) 83 17 116/62 98 %   05/14/18 2044 97.9 °F (36.6 °C) (!) 105 18 123/72 98 %   05/14/18 1950 - (!) 120 - 125/72 98 %   05/14/18 1829 98.1 °F (36.7 °C) (!) 107 20 129/82 98 %     Oxygen Therapy  O2 Sat (%): 99 % (05/15/18 1516)  Pulse via Oximetry: 91 beats per minute (05/15/18 1516)  O2 Device: Nasal cannula (05/15/18 1516)  O2 Flow Rate (L/min): 3 l/min (05/15/18 1516)  FIO2 (%): 65 % (05/10/18 1948)    Intake/Output Summary (Last 24 hours) at 05/15/18 1555  Last data filed at 05/15/18 1030   Gross per 24 hour   Intake             1800 ml   Output             2550 ml   Net             -750 ml       General:    Well nourished. Alert. No distress. Eyes:   Normal sclera. Extraocular movements intact. ENT:  Normocephalic, atraumatic. Moist mucous membranes  CV:   Regular rate and rhythm. No murmur, rub, or gallop. Lungs:  Clear to auscultation bilaterally. No wheezing, rhonchi, or rales. Abdomen: Soft, nontender, nondistended. Bowel sounds normal.   Extremities: Warm and dry. No cyanosis or edema. Neurologic: CN II-XII grossly intact. Sensation intact. Skin:     No rashes or jaundice. Psych:  Normal mood and affect. Discharge Info:   Current Discharge Medication List      START taking these medications    Details   !! albuterol (PROVENTIL HFA, VENTOLIN HFA, PROAIR HFA) 90 mcg/actuation inhaler Take 2 Puffs by inhalation three (3) times daily. Qty: 1 Inhaler, Refills: 11      apixaban (ELIQUIS) 5 mg tablet Take 1 Tab by mouth every twelve (12) hours for 30 days. Qty: 60 Tab, Refills: 0      azithromycin (ZITHROMAX) 600 mg tablet Take 2 Tabs by mouth every seven (7) days. Qty: 4 Tab, Refills: 0      dilTIAZem CD (CARDIZEM CD) 180 mg ER capsule Take 1 Cap by mouth two (2) times a day for 30 days. Qty: 60 Cap, Refills: 0      metoprolol tartrate 75 mg tab Take 75 mg by mouth every twelve (12) hours for 30 days. Qty: 60 Tab, Refills: 0      !! predniSONE (DELTASONE) 20 mg tablet Take 2 Tabs by mouth daily (with breakfast) for 5 days. Qty: 10 Tab, Refills: 0      !! predniSONE (DELTASONE) 20 mg tablet Take 1 Tab by mouth daily (with breakfast) for 14 days. Qty: 14 Tab, Refills: 0      trimethoprim-sulfamethoxazole (BACTRIM DS, SEPTRA DS) 160-800 mg per tablet Take 2 Tabs by mouth every eight (8) hours for 15 days.   Qty: 90 Tab, Refills: 0       !! - Potential duplicate medications found. Please discuss with provider. CONTINUE these medications which have NOT CHANGED    Details   !! albuterol (PROVENTIL HFA, VENTOLIN HFA, PROAIR HFA) 90 mcg/actuation inhaler Take 2 Puffs by inhalation every four (4) hours as needed for Wheezing. !! - Potential duplicate medications found. Please discuss with provider. STOP taking these medications       fluticasone-vilanterol (BREO ELLIPTA) 100-25 mcg/dose inhaler Comments:   Reason for Stopping:         HYDROcodone-homatropine (HYCODAN) 5-1.5 mg/5 mL syrup Comments:   Reason for Stopping:         loratadine (CLARITIN) 10 mg tablet Comments:   Reason for Stopping:         guaiFENesin ER (MUCINEX) 600 mg ER tablet Comments:   Reason for Stopping:                 Disposition: home with     Activity: Activity as tolerated  Diet: DIET CARDIAC Regular  FOOD SVCS COMMENTS  DIET NUTRITIONAL SUPPLEMENTS All Meals; Ensure Enlive (chocolate only)    Follow-up Appointments   Procedures    FOLLOW UP VISIT Appointment in: [address] cardiology     [address] cardiology     Standing Status:   Standing     Number of Occurrences:   1     Order Specific Question:   Appointment in     Answer: Two Weeks    FOLLOW UP VISIT Appointment in: One Month [place] [facility]     [place] [facility]     Standing Status:   Standing     Number of Occurrences:   1     Order Specific Question:   Appointment in     Answer:   One Month         Follow-up Information     Follow up With Details Comments [facility] OFFICE Call in 2 weeks afib 2 [place]   [address] [address] [id number] [address] [facility]  [address] [facility] 5/21 @10 Nurse.  ID Provider 6/6 @10:20 [address] [facility] [place] In 1 month hospital follow up [address] [facility] [address] [address]  [facility] Simeon Ojeda [address] [place] [address]  [phone]            Time spent in patient discharge planning and coordination 35 minutes.   Discharge plan discussed with pulmonary, ID, cardiology, and Dr. Debi Mcgill  Signed:  August Croft NP

## 2018-05-15 NOTE — PROGRESS NOTES
Hospitalist Progress Note    Subjective:   Daily Progress Note: 5/15/2018 12:21 PM    History:  Patient with cough, congestion and intermittent chest pain x 2 weeks ago presented to ER 5/8 with sudden onset worsening symptoms, tachycardia to 150 and shortness of breath x 12 hours prior to admission. [facility] also reported near syncope, palpitations, nausea and vomiting onset am of presentation.  Wheezes and rales on presentation.  One single episode of hemoptysis.  Chills and night sweats. He was initially seen at an urgent care about 12 days ago and placed on zithromax and steroids. [facility] was then seen by his PHP 5/1 with negative CXR and given unknown type of cough meds.  History includes allergic rhinitis, childhood asthma and migraines. He smoked cigarettesx 5 years until February of this year, continues to smoke marijuana. He is found in atrial fib/flutter with RVR, WBC is 14.0 with left shift, PT: 16.1, PTT: 40.8.  CXR with patchy bilateral airspace opacities as below.  No history of cardiac problems or arrhythmias.  Oxygen sat 92% on room air.  Lactic acid 1.0. BNP normal. UDS positive for THC, PCP, and opiates. He was placed on cardizem drip after boluses x2, oxygen.  Blood cultures done, nebs begun, heparin drip begun, cardiology and pulmonary consulted, Jasiel Camera on zithromax and rocephin, and one liter of saline.  Has had homosexual partners in the past      5/9:  Temp to 101.8.  Pulmonary in.  Negative blood cultures to day.  Hypoxia requiring oxygen at 4 liters continues.  Sputum culture sent. Significant ground glass opacities on chest CT.  Suspicion for HIV.  Begun on IV TMP-SMX, steroids.  Cardiology in, continue cardizem and lopressor. Not a good candidate for anticoags.  Cardiology in.  Day 2 of rocephin and zithro, negative BC x 2 to date. Continued hypoxia requiring oxygen. May need bronch washings.       5/10:  Rapid HIV positive with confirmation pending and high clinical suspicion. LDH elevated.  [employer] concern for PCP pneumonia.  Begun in IV TMP-SMX, steroid taper (40 mg bid x 5, 40 mg daily x 5, 20 mg daily x 11).  ID in, patient denies IV drug use, admits to being treated for syphilis with PCN a few years ago. Zahra Bowman found thrush.  Multiple additional tests ordered.  Continue rocephin, zithromax and bactrim.  Last screened for HIV in 2015 with negative findings. Sputum culture growing moderate gram negative rods, few gram positive rods, few yeast and +1 mucus.  Blood culture 2/2 negative to date. Negative flu A and B.  Nutrition in.  Heart rate stable, changed to po diltiazem per Cards.  Placed on non rebreather mask.  Episode of hypoxia to 69% with plans to move to ICU out of concern for developing ARDs.  Placed on optiflow 45% with marked improvement and saturation rise to 98%.  Transfer held briefly.        5/11:  Changed to 10 liters HFNC with good tolerance.  Rate controlled.  Switching heparin to eliquis 5 mg bid.  RPR and Hep C antibody negative.  Pending GC and chlamydia, treated empirically with rocephin and zithro. Continued IVF.  Pulmonary in, to wean oxygen as tolerated, currently on 9 liters.  Pending fungitell, weaning IVF.        5/12:  PT in. Scheduled xopenex, pulmicort continues.  Decreased IVF to 50 ml/hr.  ID, Pulmonary, Cardiology following.  Feeling better this afternoon.  Dr Tawny Rosario in also.  Less coughing.  Fungitell >500. HCV:  Negative     5/13:  Having a rough day. Nasal congestion. SOB and hypoxia with minimal exertion and extended conversation. Continued oxygen to 8 liters with Pulmonary beginning to wean. Continues to expectorate thick tan sputum. Reports having difficulty taking a deep breath. Denies pain. Upbeat about HIV diagnosis, states he has many friends who are positive and he will take care of himself on discharge. Heart rate continues to average mid 90's. WBC up to 13.0 today, 9.1 yesterday. Hgb: 9.9 down from 11.1. RT continuing scheduled aerosols.   mucinex increased to 1200 mg q 12 hours, added claritin 10 mg daily and afrin per Pulmonary. Patient reports seasonal allergies and hayfever at baseline.       5/14:  Up in chair, states he has more energy today. Much better today with less SOB and air hunger. Aunt at bedside. Labs normalizing. Rate creeping back up into the 120's. Oxygen sat decreased to 87% on 4 liters oxygen after ambulating 15 ft with PT. Dropped to 82% on 4 liters after ambulating 60 feet. May need home oxygen temporarily, patient is aware. Pulmonary ordering appropriate tests. 5/15: Patient states that he feels much better today. 3LNC. SOB with activity. Productive cough improving. Denies CP, n/v/d. 's today. Current telemetry afib in the 70's. Patient states he has not informed anyone of his diagnosis, but has a great support system at home, and plans to tell them at a later date.      Current Facility-Administered Medications   Medication Dose Route Frequency    metoprolol tartrate (LOPRESSOR) tablet 50 mg  50 mg Oral Q12H    azithromycin (ZITHROMAX) tablet 1,200 mg  1,200 mg Oral Q7D    albuterol (PROVENTIL HFA, VENTOLIN HFA, PROAIR HFA) inhaler 2 Puff  2 Puff Inhalation TID RT    loratadine (CLARITIN) tablet 10 mg  10 mg Oral DAILY    apixaban (ELIQUIS) tablet 5 mg  5 mg Oral Q12H    trimethoprim-sulfamethoxazole (BACTRIM DS, SEPTRA DS) 160-800 mg per tablet 2 Tab  2 Tab Oral Q8H    predniSONE (DELTASONE) tablet 40 mg  40 mg Oral DAILY WITH BREAKFAST    Followed by   Deon Littlejohn ON 5/20/2018] predniSONE (DELTASONE) tablet 20 mg  20 mg Oral DAILY WITH BREAKFAST    nystatin (MYCOSTATIN) 100,000 unit/mL oral suspension 500,000 Units  500,000 Units Oral QID    dilTIAZem CD (CARDIZEM CD) capsule 180 mg  180 mg Oral BID    famotidine (PEPCID) tablet 20 mg  20 mg Oral BID    sodium chloride (OCEAN) 0.65 % nasal squeeze bottle 2 Spray  2 Spray Both Nostrils Q2H PRN    HYDROcodone-homatropine (HYCODAN) 5-1.5 mg/5 mL (5 mL) syrup 5 mL  5 mL Oral Q6H PRN    acetaminophen (TYLENOL) tablet 650 mg  650 mg Oral Q6H PRN    traMADol (ULTRAM) tablet 50 mg  50 mg Oral Q6H PRN        Review of Systems  A comprehensive review of systems was negative except for that written in the HPI. Objective:     Visit Vitals    /87    Pulse (!) 120    Temp 98 °F (36.7 °C)    Resp 20    Ht 6' 2\" (1.88 m)    Wt 66.7 kg (147 lb)    SpO2 100%    BMI 18.87 kg/m2    O2 Flow Rate (L/min): 3 l/min O2 Device: Nasal cannula    Temp (24hrs), Av.8 °F (36.6 °C), Min:97.4 °F (36.3 °C), Max:98.1 °F (36.7 °C)    05/15 0701 - 05/15 1900  In: 480 [P.O.:480]  Out: 650 [Urine:650]  1901 - 05/15 0700  In: 2052 [P.O.:2520]  Out: 4000 [Urine:4000]    General appearance: Sitting up in bed. Oriented and alert. In good spirits with extremely positive attitude. Head: Normocephalic, without obvious abnormality, atraumatic  Throat: Lips, mucosa, and tongue slightly dry. Teeth and gums normal  Neck: supple, symmetrical.  trachea midline, no JVD  Lungs: clear to auscultation bilaterally. Diminished. 3LNC. Productive cough  Heart: Irregular rate and rhythm. Telemetry afib 70's. .  S1, S2 normal, no murmur, click, rub or gallop  Abdomen: soft, non-tender. Bowel sounds normal. No masses,  no organomegaly  Extremities: extremities normal, atraumatic, no cyanosis or edema  Skin: Skin color, texture, turgor normal. No rashes or lesions  Neurologic: Grossly normal      Additional comments:  NOTES, ORDERS, TEST RESULTS, VITALS REVIEWED. Data Review  No results found for this or any previous visit (from the past 24 hour(s)). ECHOCARDIOGRAM:  Echo: Left ventricle: Systolic function was at the lower limits of normal.  Ejection fraction was estimated in the range of 50 % to 55 %. There were no regional wall motion abnormalities.    -  Atrial septum: Agitated saline contrast injection (bubble study) was performed.  There was no right-to-left shunt, with provocative maneuvers to increase right atrial pressure. -  Mitral valve: There was mild regurgitation. -  Tricuspid valve: There was mild regurgitation. -  Pericardium: A small pericardial effusion was identified posterior to the heart. There was no evidence of hemodynamic compromise         Abs CD4 Delong 25 (L) 359 - 1519 /uL Final      % CD 4 Pos Lymph 4.1 (L) 30.8 - 58.5 % Final       5/2/18:  CXR: [place] Fee is no developing focal infiltrate.  There are no effusions.  The heart size is normal.  The bony thorax is intact.    IMPRESSION: No acute findings in the chest      5/8/18:  CXR: The cardiac and mediastinal silhouette are normal in size and configuration. Moderate patchy bilateral airspace opacities are present. There are no pleural effusions. Impression: Patchy bilateral airspace opacities could represent pulmonary edema or pneumonia.      5/8:  CT CHEST :Heart size normal. No pericardial effusion or pleural effusion. Limited evaluation of the noncontrast upper abdomen shows no acute abnormality. No acute or aggressive osseous lesion. No pathologic adenopathy or pneumothorax. Diffuse perihilar groundglass pulmonary infiltrates. Presence of the infiltrates limits full evaluation for small lesion. Impression:  Diffuse perihilar pulmonary infiltrates. FINDINGS are nonspecific and may represent atypical appearance of edema, infection, other alveolar process such as can be seen with pulmonary hemorrhage.      5/9:  CXR:  Fluffy opacities blurring the vascular borders and abdominal perihilar distribution worsened from the reference study. Cardiac silhouette is felt to be upper normal in size. IMPRESSION: Worsening opacities in the lungs may be edema.  Diffuse haziness over both lungs similar to the prior exam without  definite change. Relative sparing the extreme apices and extreme lung bases. Costophrenic angles are sharp.  Heart size normal.   IMPRESSION:  Diffuse edema-like pattern throughout both lungs some yesterday's exam.        5/10:  CXR:  Diffuse haziness over both lungs similar to the prior exam without definite change. Relative sparing the extreme apices and extreme lung bases. Costophrenic angles are sharp. Heart size normal.   IMPRESSION:  Diffuse edema-like pattern throughout both lungs some yesterday's exam.       5/11:  CXR:  Cardiac silhouette and pulmonary vascularity are obscured by hazy opacity in both lungs with air bronchograms. IMPRESSION: Findings suggest pulmonary edema, overall no significant interval change.     5/10:  LD: 618  5/10:  APTT:  111.9  5/10 urine GC/chlamydia: negative  5/10 HIV screen: Reactive                        HIV Ab: positive                        VL: 1,090,000                        CD4 25 / 4.1%  5/10 RPR: NR  5/10 HCV Ab: 0.1  5/10 HepB sAb: 120.19  5/10 sputum: NRF  5/9 sputum: NRF  5/12:  Fungitell:  >500  Influenza A and B:  Negative  5/13:  Sputum gram stain:  5-50 WBCs/OIF, moderate gram negative rods, few Gram negative Cocci, moderate gram positive rods, rare yeast, +1 mucus. Culture: Moderate normal respiratory [place]    Assessment/Plan:   Cough  Respiratory failure with Hypoxia  Leukocytosis:  resolved  Tachycardia  Sepsis  Fever  New onset atrial fib with RVR:  Rate now controlled  Recreational drug use  HIV+ new diagnosis  PCP (pneumocystis carinii pneumonia)  Thrush  Allergic rhinitis  History of syphilis:  RPR negative  Thrush  Anemia    PLAN:   Lopressor increased to 75 mg bid 5/14, continue cardizem   Continue Eliquis    Proair per Pulmonary   Continues to require oxygen 3 liters, pulmonary weaning and most likely need home oxygen, RT to assess  To follow up with Pulmonary in office with CXR in one month. They are arranging nebs at at home. ID Consult-  Continue Bactrim DS 2 tabs q8hts for 21 days (5/31)  Wean prednisone 40mg x 5 days, 20mg x 2 weeks. Starting 5/15.    Start Zithromycin 1200mg weekly for MAC prophylaxis  Check AFB blood culture, serum cryto Ag, Quantiferon Gold. Appointment at [place] [address] and [address] [place] [address] all input from Cardiology, Pulmonary and ID   Patient requesting shower chair on discharge.      DVT: eliquis    Care Plan discussed with:   Dr. Kendall Jacobs By: Irene Coronado NP     May 15, 2018

## 2018-05-15 NOTE — PROGRESS NOTES
Care Management Interventions  PCP Verified by CM: Yes (last week)  Palliative Care Criteria Met (RRAT>21 & CHF Dx)?: No (RRAT 3 Dx Pneumonia)  Mode of Transport at Discharge: Other (see comment) (family)  Transition of Care Consult (CM Consult): [address]: Yes  Discharge Durable Medical Equipment: Yes (O2 from [place])  Physical Therapy Consult: Yes  Occupational Therapy Consult: No  Speech Therapy Consult: No  Current Support Network: Lives Alone  Confirm Follow Up Transport: Self  Plan discussed with Pt/Family/Caregiver: Yes  Freedom of Choice Offered: Yes  Discharge Location  Discharge Placement: Home with home health  Patient ready for d/c. Patient request shower chair check with [employer] and his insurance does not cover shower chair but will cover 3 in 1 BSC. Spoke with patient and he stated did not want to [facility] and will do shower chair at [address] or another store. Patient will need home health PT and RN monitoring and chose [facility] [address] [place] on way with O2 tank for patient. Patient voices no other needs or concerns. Patient to d/c home to his parents house with home health follow up.

## 2018-05-15 NOTE — PROGRESS NOTES
Infectious Disease Progress Note    Today's Date: 5/15/2018   Admit Date: 2018    Impression:   · Pneumonia -presumed PJP, responding clinically. Still on ~4L NC and would need supplemental O2 at discharge. Fungitell >500 5/10/18  · HIV/AIDS- CD4 25, VL > 1,000,000, genotype pending  · Hx of syphilis s/p tx, RPR neg  · Thrush   · AFib- rate 127 this am (EF nl, TSH nl)    GC/chla: negative, Hep B sAb negative, HCV Ab negative, UDS positive: PCP/THC/opiates  Plan:     · Complete treatment dose Bactrim DS 2 tabs Q8hrs  for 21 days through , then reduce to prophylactic dose. Follow PJP DFA  · Continue to wean prednisone: today down to 40mg daily x 5 days, then 20mg daily for ~2 weeks, then re-assess  · Arrange Outpatient followup/initiation of ART (genotype pending)  · Start Zithromycin 1200mg weekly for MAC prophylaxis. · Check AFB blood culture, serum crypto Ag, Quantiferon Gold  · Call placed to [place] [id number] and NH clinic today: nurse intake 18 at 10 am, ID provider appt: 18 @ 10:20am  · Check BMP in am  · To note, no family is aware of dx at present, but he does have a strong support system      Pt seen and examined with ID Nurse Practitioner. Agree with exam, assessment, and plan except for any differences as noted below. Pt stable for discharge with close [facility] follow up and initiation of ART. Anti-infectives: Bactrim treatment dose   Zithro ppx  Subjective:   Resting in bed, breathing some what better. Reports MÁRQUEZ, currently on 3LNC. No fever, chills, nausea or diarrhea. Educated on HIV treatment process, linked to care today. >35mins spent seeing pt/50% face time. No Known Allergies     Review of Systems:    Negative except for above.     Objective:     Visit Vitals    /87    Pulse (!) 120    Temp 98 °F (36.7 °C)    Resp 20    Ht 6' 2\" (1.88 m)    Wt 66.7 kg (147 lb)    SpO2 100%    BMI 18.87 kg/m2     Temp (24hrs), Av.8 °F (36.6 °C), Min:97.4 °F (36.3 °C), Max:98.1 °F (36.7 °C)       Lines: PIVs    Physical Exam:    General:  Alert, cooperative, well noursished, no distress   Eyes:  Sclera anicteric. Pupils equally round and reactive to light. Mouth/Throat: No thrush noted today   Neck: Supple   Lungs:   Mild diffuse crackles bilaterally, mild-mod MÁRQUEZ,    CV:  Irregular, rapid   Abdomen:   Soft, non-tender.  bowel sounds normal. non-distended   Extremities: No cyanosis or edema   Skin: Skin color, texture, turgor normal. no acute rash or lesions   Musculoskeletal: No swelling or deformity   Lines/Devices:  Intact, no erythema, drainage or tenderness   Psych: Alert and oriented, normal mood affect given the setting       Data Review:     CBC:  Recent Labs      05/14/18   0406  05/13/18   0348   WBC  9.9  13.0*   GRANS  92*  93*   MONOS  2*  3*   EOS  0*  0*   ANEU  9.1*  12.2*   ABL  0.5  0.4*   HGB  10.7*  9.9*   HCT  31.4*  29.8*   PLT  463*  378       BMP:  Recent Labs      05/14/18   0406  05/13/18   0348   CREA  0.83  0.81   BUN  20  18   NA  138  139   K  4.8  4.8   CL  103  105   CO2  27  27   AGAP  8  7   GLU  94  90       LFTS:  Recent Labs      05/14/18   0406  05/13/18 0348   TBILI  0.2  0.3   ALT  16  13   SGOT  47*  54*   AP  50  46*   TP  7.2  6.8   ALB  1.8*  1.7*       Microbiology:     All Micro Results     Procedure Component Value Units Date/Time    PNEUMOCYSTIS JIROVECI - DFA [[id number]] Collected:  05/14/18 1608    Order Status:  Completed Updated:  05/14/18 1649    CULTURE, RESPIRATORY/SPUTUM/BRONCH [place] STAIN [[id number]] Collected:  05/10/18 2022    Order Status:  Completed Specimen:  Sputum from Sputum Updated:  05/13/18 7695     Special Requests: NO SPECIAL REQUESTS        GRAM STAIN  5 TO 50 WBC'S/OIF      0 TO 5  EPITHELIAL CELLS SEEN /OIF              MODERATE GRAM NEGATIVE RODS      FEW GRAM NEGATIVE COCCI                 MODERATE GRAM POSITIVE RODS      RARE YEAST         1 + MUCUS     Culture result:         MODERATE NORMAL RESPIRATORY KODY CULTURE, BLOOD [[id number]] Collected:  05/08/18 1547    Order Status:  Completed Specimen:  Whole Blood from Blood Updated:  05/13/18 0616     Special Requests: LEFT FOREARM        Culture result: NO GROWTH 5 DAYS       CULTURE, BLOOD [[id number]] Collected:  05/08/18 1547    Order Status:  Completed Specimen:  Whole Blood from Blood Updated:  05/13/18 0616     Special Requests: --        RIGHT  HAND       Culture result: NO GROWTH 5 DAYS       CULTURE, RESPIRATORY/SPUTUM/BRONCH [place] STAIN [[id number]] Collected:  05/09/18 1131    Order Status:  Completed Specimen:  Sputum Updated:  05/11/18 0744     Special Requests: NO SPECIAL REQUESTS        GRAM STAIN 0 TO 30 WBC'S/OIF      0 TO 4 EPITHELIAL CELLS SEEN /OIF              MODERATE GRAM NEGATIVE RODS      FEW GRAM POSITIVE RODS         FEW YEAST         1 + MUCUS     Culture result:         MODERATE NORMAL RESPIRATORY KODY    INFLUENZA A & B AG (RAPID TEST) [[id number]] Collected:  05/08/18 1731    Order Status:  Completed Specimen:  Nasopharyngeal from Nasal washing Updated:  05/08/18 1750     Influenza A Ag NEGATIVE          NEGATIVE FOR THE PRESENCE OF INFLUENZA A ANTIGEN  INFECTION DUE TO INFLUENZA A CANNOT BE RULED OUT. BECAUSE THE ANTIGEN PRESENT IN THE SAMPLE MAY BE BELOW  THE DETECTION LIMIT OF THE TEST. A NEGATIVE TEST IS PRESUMPTIVE AND IT IS RECOMMENDED THAT THESE RESULTS BE CONFIRMED BY VIRAL CULTURE OR AN FDA-CLEARED INFLUENZA A AND B MOLECULAR ASSAY. Influenza B Ag NEGATIVE          NEGATIVE FOR THE PRESENCE OF INFLUENZA B ANTIGEN  INFECTION DUE TO INFLUENZA B CANNOT BE RULED OUT. BECAUSE THE ANTIGEN PRESENT IN THE SAMPLE MAY BE BELOW  THE DETECTION LIMIT OF THE TEST. A NEGATIVE TEST IS PRESUMPTIVE AND IT IS RECOMMENDED THAT THESE RESULTS BE CONFIRMED BY VIRAL CULTURE OR AN FDA-CLEARED INFLUENZA A AND B MOLECULAR ASSAY. Imaging:   CXR:  IMPRESSION: Worsening opacities in the lungs may be edema. CT:  Impression:   1. Diffuse perihilar pulmonary infiltrates. FINDINGS are nonspecific and may  represent atypical appearance of edema, infection, other alveolar process such  as can be seen with pulmonary hemorrhage.     Signed By: Margo Blanco NP     May 15, 2018

## 2018-05-15 NOTE — ADT AUTH CERT NOTES
Utilization Review           Pneumonia, Community Acquired - Care Day 8 (5/15/2018) by Zayda De Guzman RN        Review Status Review Entered       Completed 5/15/2018       Details              Care Day: 8 Care Date: 5/15/2018 Level of Care: Telemetry       Guideline Day 2        Level Of Care       (X) Floor       5/15/2018 2:38 PM EDT by Varun Hawkins         TELE                     Clinical Status       (X) * No CO2 retention or acidosis       5/15/2018 2:43 PM EDT by Varun Hawkins         Chloride: 100     CO2: 26    Anion gap: 8              (X) * No requirement for mechanical ventilation       5/15/2018 2:43 PM EDT by Varun Hawkins         SAT 92% 3L NC              (X) * Hypotension absent       5/15/2018 2:38 PM EDT by Varun Hawkins         /67, , SAT 92% 3L NC              (X) * Fever absent or reduced       5/15/2018 2:38 PM EDT by Varun Hawkins         T 97.6              ( ) * No hypoxia on room air or oxygenation improved       5/15/2018 2:38 PM EDT by Varun Hawkins         SAT 92% 3L NC              (X) * Mental status improved or at baseline       5/15/2018 2:38 PM EDT by Varun Hawkins         Alert and oriented                     Activity       (X) * Increased activity       5/15/2018 2:38 PM EDT by Varun Hawkins         AMB TO BR                     Routes       (X) Oral hydration, medications       5/15/2018 2:38 PM EDT by Varun Hawkins         PROVENTIL INH TID, ELIQUIS 5 MG PO Q 12, CARDIZEM  MG  PO BID, PEPCID PO BID, NYSTATIN PO QID, DELTASONE 40 MG PO QD, LOPRESSOR 75 MG PO Q 12, AFRIN              (X) Usual diet       5/15/2018 2:38 PM EDT by Varun Hawkins         CARDIAC                     Interventions       (X) Pulse oximetry       5/15/2018 2:38 PM EDT by Varun Hawkins         SAT 92% 3L NC              (X) Head of bed at 30 degrees       (X) Possible oxygen       5/15/2018 2:38 PM EDT by Varun Hawkins         SAT 92% 3L NC                     Medications       (X) IV or oral antibiotics       5/15/2018 2:38 PM EDT by Erendira Wong         ZITHROMAX 1200 MG PO Q 7 DAYS, BACTRIM /800 MG PO Q 8,                     5/15/2018 2:40 PM EDT by Erendira Wong       Subject: Additional Clinical Information       WBC: 9.5   RBC: 4.08 (L)   HGB: 12.9 (L)   HCT: 37.2 (L)   Sodium: 134 (L)   Potassium: 4.7   Glucose: 111 (H)   Calcium: 10.5 (H)   QUANTIFERON TB GOLD, TOXOPLASMA GONDII AB, CRYPTOCOCCUS AG PENDING              5/15/2018 2:38 PM EDT by Erendira Wong       Subject: Additional Clinical Information       Impression:Pneumonia -presumed PJP, responding clinically. Still on ~4L NC and would need supplemental O2 at discharge. Fungitell >500 5/10/18HIV/AIDS- CD4 25, VL > 1,000,000, genotype pendingHx of syphilis s/p tx, RPR negThrush AFib- rate 127 this am (EF nl, TSH nl)GC/chla: negative, Hep B sAb negative, HCV Ab negative, UDS positive: PCP/THC/opiatesPlan:  Complete treatment dose Bactrim DS 2 tabs Q8hrs   for 21 days through 5/31, then reduce to prophylactic dose. Follow PJP DFAContinue to wean prednisone: today down to 40mg daily x 5 days, then 20mg daily for ~2 weeks, then re-assessArrange Outpatient followup/initiation of ART (genotype pending)Start Zithromycin 1200mg weekly for MAC prophylaxis.  Check AFB blood culture, serum crypto Ag, Quantiferon GoldCall [place] [id number] and [place] clinic today: nurse intake 5/21/18 at 10 am, ID provider [facility] amTo note, no family is aware of dx at present, but he does have a strong support system                                     * Milestone                  Pneumonia, Community Acquired - Care Day 7 (5/14/2018) by Thania Strickland        Review Status Review Entered       Completed 5/14/2018       Details              Care Day: 7 Care Date: 5/14/2018 Level of Care: Telemetry       Guideline Day 2        Clinical Status       (X) * No CO2 retention or acidosis       (X) * No requirement for mechanical ventilation       (X) * Hypotension absent       ( ) * Fever absent or reduced       ( ) * No hypoxia on room air or oxygenation improved       (X) * Mental status improved or at baseline              Activity       (X) * Increased activity              Routes       (X) Oral hydration, medications       (X) Usual diet              Interventions       (X) Head of bed at 30 degrees       (X) Possible oxygen              Medications       (X) IV or oral antibiotics              5/14/2018 1:10 PM EDT by Jaylen Lorenzo       Subject: Additional Clinical Information       5/14/18: ã         ï· Complete treatment dose Bactrim for 21 d through 5/31, then reduce to prophylactic dose         ï· Continue to wean prednisone         ï· Arrange Outpatient followup/initiation of ART (genotype pending)         ï· HCV ab neg, HBsab positive         ï· Afib management per Cardiology/hospitalists         ï· Discontinue CTX/Azithro         ã         T 97.3; /66; HR 89; RR 16; 96%         LABS: GRANS 92; MONOS 2; EOS 0; ANEU 9.1; H/H 10.7/31.4;          MEDS: LOPRESSOR 75MG PO BID; XOPENEX 0.63MG PO Q6HRS; ROCEPHIN 2G IV QD; PULMICORT NEBS BID;                                          * Milestone                  Pneumonia, Community Acquired - Care Day 6 (5/13/2018) by Kaylynn Schaefer        Review Status Review Entered       Completed 5/14/2018       Details              Care Day: 6 Care Date: 5/13/2018 Level of Care: Telemetry       Guideline Day 2        Clinical Status       (X) * No CO2 retention or acidosis       (X) * No requirement for mechanical ventilation       (X) * Hypotension absent       ( ) * Fever absent or reduced       ( ) * No hypoxia on room air or oxygenation improved       (X) * Mental status improved or at baseline              Activity       (X) * Increased activity              Routes       (X) Oral hydration, medications       (X) Usual diet              Interventions       (X) Head of bed at 30 degrees       (X) Possible oxygen              Medications       (X) IV or oral antibiotics              5/14/2018 1:05 PM EDT by America Contreras       Subject: Additional Clinical Information       5/12/18: T 97.9; /79; ; RR 16; 96% 10L HFNC         Currently on O2 at 10 lpm with O2 sat 96%. + cough productive of light colored mucus which he feels is nasal drainage. ãDenies chest pain.  Occasional palpitations worse with exertion.         NO NEW LABS         MEDS: XOPONEX NEBS Q6HRS; NS @ 50ML/HR; ZITHROMAX 500MG PO QD; PULMICORT NEBS BID; ROCEPHIN 2G IV QD                                          * Milestone

## 2018-05-16 ENCOUNTER — HOME CARE VISIT (OUTPATIENT)
Dept: SCHEDULING | Facility: HOME HEALTH | Age: 28
End: 2018-05-16
Payer: COMMERCIAL

## 2018-05-16 VITALS
TEMPERATURE: 97 F | OXYGEN SATURATION: 99 % | HEART RATE: 112 BPM | SYSTOLIC BLOOD PRESSURE: 104 MMHG | DIASTOLIC BLOOD PRESSURE: 68 MMHG | RESPIRATION RATE: 18 BRPM

## 2018-05-16 LAB — T GONDII IGG SERPL IA-ACNC: <3 IU/ML (ref 0–7.1)

## 2018-05-16 PROCEDURE — G0299 HHS/HOSPICE OF RN EA 15 MIN: HCPCS

## 2018-05-16 PROCEDURE — 400013 HH SOC

## 2018-05-16 NOTE — PROGRESS NOTES
Patient requested [facility] and this has been order and referral sent to [address] [place] [address].

## 2018-05-18 ENCOUNTER — TELEPHONE (OUTPATIENT)
Dept: HOME HEALTH SERVICES | Facility: HOME HEALTH | Age: 28
End: 2018-05-18

## 2018-05-18 LAB
ANNOTATION COMMENT IMP: NORMAL
M TB IFN-G CD4+ BCKGRND COR BLD-ACNC: <0 IU/ML
M TB IFN-G CD4+ T-CELLS BLD-ACNC: 0.03 IU/ML
M TB TUBERC IGNF/MITOGEN IGNF CONTROL: 0.05 IU/ML
QUANTIFERON INCUBATION: NORMAL
QUANTIFERON NIL VALUE: 0.04 IU/ML
QUANTIFERON TB GOLD, 182875: NORMAL
SERVICE CMNT-IMP: NORMAL

## 2018-05-18 NOTE — TELEPHONE ENCOUNTER
[facility] Pharmacist consult. I spoke with . Elodia Glaser today and explained my part of the [facility] team.  I reviewed his discharge medications. He has 2 of his new medications at the drugstore to  today. He feels he is getting a bed sore and wanted to know if [facility] could help with that. I told him the nurse would review and I would email her to let her know. I gave him my cell phone number and asked him to call me with any medication questions or issues.

## 2018-05-21 ENCOUNTER — HOME CARE VISIT (OUTPATIENT)
Dept: SCHEDULING | Facility: HOME HEALTH | Age: 28
End: 2018-05-21
Payer: COMMERCIAL

## 2018-05-21 VITALS
DIASTOLIC BLOOD PRESSURE: 60 MMHG | OXYGEN SATURATION: 99 % | HEIGHT: 74 IN | SYSTOLIC BLOOD PRESSURE: 100 MMHG | HEART RATE: 62 BPM | RESPIRATION RATE: 16 BRPM | TEMPERATURE: 98.7 F | BODY MASS INDEX: 17.58 KG/M2 | WEIGHT: 137 LBS

## 2018-05-21 PROCEDURE — G0151 HHCP-SERV OF PT,EA 15 MIN: HCPCS

## 2018-05-22 PROBLEM — R09.02 HYPOXIA: Status: RESOLVED | Noted: 2018-05-09 | Resolved: 2018-05-22

## 2018-05-22 PROBLEM — A41.9 SEPSIS (HCC): Status: RESOLVED | Noted: 2018-05-10 | Resolved: 2018-05-22

## 2018-05-22 LAB
HIV RT+PR MUT DET ISLT: NORMAL
HIV RT+PR MUT DET ISLT: NORMAL

## 2018-05-23 ENCOUNTER — HOME CARE VISIT (OUTPATIENT)
Dept: SCHEDULING | Facility: HOME HEALTH | Age: 28
End: 2018-05-23
Payer: COMMERCIAL

## 2018-05-23 VITALS
DIASTOLIC BLOOD PRESSURE: 62 MMHG | TEMPERATURE: 98.9 F | SYSTOLIC BLOOD PRESSURE: 100 MMHG | RESPIRATION RATE: 16 BRPM | OXYGEN SATURATION: 98 % | HEART RATE: 76 BPM

## 2018-05-23 PROCEDURE — G0299 HHS/HOSPICE OF RN EA 15 MIN: HCPCS

## 2018-05-25 ENCOUNTER — HOME CARE VISIT (OUTPATIENT)
Dept: HOME HEALTH SERVICES | Facility: HOME HEALTH | Age: 28
End: 2018-05-25
Payer: COMMERCIAL

## 2018-05-30 ENCOUNTER — HOME CARE VISIT (OUTPATIENT)
Dept: SCHEDULING | Facility: HOME HEALTH | Age: 28
End: 2018-05-30
Payer: COMMERCIAL

## 2018-05-30 VITALS
RESPIRATION RATE: 18 BRPM | HEART RATE: 82 BPM | DIASTOLIC BLOOD PRESSURE: 70 MMHG | OXYGEN SATURATION: 98 % | SYSTOLIC BLOOD PRESSURE: 110 MMHG | TEMPERATURE: 97.8 F

## 2018-05-30 PROCEDURE — G0299 HHS/HOSPICE OF RN EA 15 MIN: HCPCS

## 2018-06-01 PROBLEM — I48.0 PAF (PAROXYSMAL ATRIAL FIBRILLATION) (HCC): Status: ACTIVE | Noted: 2018-05-08

## 2018-06-04 ENCOUNTER — TELEPHONE (OUTPATIENT)
Dept: HOME HEALTH SERVICES | Facility: HOME HEALTH | Age: 28
End: 2018-06-04

## 2018-06-04 NOTE — TELEPHONE ENCOUNTER
Mr. Dev Hadley said he was doing very well. He saw Dr. Riddhi Mckeon on Friday and his Eliquis and metoprolol were stopped. His diliazem was reduced to daily. He is not sleeping very well. He has tried Benadryl and it helps some. I suggested he try melatonin as it is also OTC. He can also ask his PCP for a recommendation also. He said I could call back any time.

## 2018-06-05 ENCOUNTER — HOME CARE VISIT (OUTPATIENT)
Dept: SCHEDULING | Facility: HOME HEALTH | Age: 28
End: 2018-06-05
Payer: COMMERCIAL

## 2018-06-05 VITALS
SYSTOLIC BLOOD PRESSURE: 110 MMHG | RESPIRATION RATE: 14 BRPM | HEART RATE: 98 BPM | DIASTOLIC BLOOD PRESSURE: 74 MMHG | TEMPERATURE: 99.2 F | OXYGEN SATURATION: 98 %

## 2018-06-05 PROCEDURE — G0299 HHS/HOSPICE OF RN EA 15 MIN: HCPCS

## 2018-06-19 ENCOUNTER — HOSPITAL ENCOUNTER (OUTPATIENT)
Dept: LAB | Age: 28
Discharge: HOME | End: 2018-06-19
Payer: COMMERCIAL

## 2018-06-19 ENCOUNTER — HOSPITAL ENCOUNTER (OUTPATIENT)
Dept: GENERAL RADIOLOGY | Age: 28
Discharge: HOME | End: 2018-06-19
Payer: COMMERCIAL

## 2018-06-19 DIAGNOSIS — J96.01 ACUTE RESPIRATORY FAILURE WITH HYPOXIA (HCC): ICD-10-CM

## 2018-06-19 DIAGNOSIS — B59 PNEUMONIA DUE TO PNEUMOCYSTIS JIROVECII, UNSPECIFIED LATERALITY, UNSPECIFIED PART OF LUNG (HCC): ICD-10-CM

## 2018-06-19 PROCEDURE — 87281 PNEUMOCYSTIS CARINII AG IF: CPT | Performed by: PHYSICIAN ASSISTANT

## 2018-06-19 PROCEDURE — 71046 X-RAY EXAM CHEST 2 VIEWS: CPT

## 2018-06-19 PROCEDURE — 87070 CULTURE OTHR SPECIMN AEROBIC: CPT | Performed by: PHYSICIAN ASSISTANT

## 2018-06-20 LAB
P JIROVECII AG SPEC QL IF: NEGATIVE
SPECIMEN SOURCE: NORMAL

## 2018-06-21 LAB
BACTERIA SPEC CULT: NORMAL
GRAM STN SPEC: NORMAL
SERVICE CMNT-IMP: NORMAL

## 2019-01-18 PROBLEM — B59 PCP (PNEUMOCYSTIS CARINII PNEUMONIA) (HCC): Status: RESOLVED | Noted: 2018-05-10 | Resolved: 2019-01-18

## 2019-01-18 PROBLEM — I10 ESSENTIAL HYPERTENSION: Chronic | Status: RESOLVED | Noted: 2018-04-27 | Resolved: 2019-01-18

## 2019-01-18 PROBLEM — J96.91 RESPIRATORY FAILURE WITH HYPOXIA (HCC): Status: RESOLVED | Noted: 2018-05-10 | Resolved: 2019-01-18

## 2019-02-04 ENCOUNTER — HOSPITAL ENCOUNTER (OUTPATIENT)
Dept: GENERAL RADIOLOGY | Age: 29
Discharge: HOME | End: 2019-02-04
Payer: COMMERCIAL

## 2019-02-04 DIAGNOSIS — J45.909 UNCOMPLICATED ASTHMA, UNSPECIFIED ASTHMA SEVERITY, UNSPECIFIED WHETHER PERSISTENT: Chronic | ICD-10-CM

## 2019-02-04 PROCEDURE — 71046 X-RAY EXAM CHEST 2 VIEWS: CPT

## 2020-06-07 NOTE — PROGRESS NOTES
IVs removed and telebox returned to monitor room. PCT helping patient get dressed. Pt waiting on ride which is coming in about 1 hour. Discharge paperwork in progress. [facility]  Cardiothoracic Surgery  Progress Note    Patient Name: Jeffery Melton  MRN: [id number]  Admission Date: 6/5/2020  Hospital Length of Stay: 2 days  Code Status: Prior   Attending Physician: Anuj Ceron MD   Referring Provider: Anuj Ceron MD  Principal Problem:S/P CABG (coronary artery bypass graft)    Subjective:   Interval History/Significant Events:   Stepped down to CTSU uneventfully, VSS, afeb. Rough night due to back pain. Minimal chest tube output. On 3L. Great UOP. On cardiac diet.    Objective:     Vital Signs (Most Recent):  Temp: 98.6 °F (37 °C) (06/07/20 0726)  Pulse: 91 (06/07/20 1053)  Resp: 18 (06/07/20 0726)  BP: (!) 141/76 (06/07/20 0726)  SpO2: 96 % (06/07/20 0726) Vital Signs (24h Range):  Temp:  [98.3 °F (36.8 °C)-99.3 °F (37.4 °C)] 98.6 °F (37 °C)  Pulse:  [73-94] 91  Resp:  [18-33] 18  SpO2:  [92 %-98 %] 96 %  BP: (103-141)/(55-76) 141/76     Weight: 103 kg (227 lb 1.2 oz)  Body mass index is 31.67 kg/m².      Intake/Output Summary (Last 24 hours) at 6/7/2020 1136  Last data filed at 6/7/2020 0800  Gross per 24 hour   Intake 963 ml   Output 2425 ml   Net -1462 ml       Physical Exam   Constitutional: He appears well-developed and well-nourished.   HENT:   Head: Normocephalic and atraumatic.   Eyes: Right eye exhibits no discharge. Left eye exhibits no discharge. No scleral icterus.   Neck: No JVD present.   RIJ out   Cardiovascular: Normal rate and regular rhythm.   Pulmonary/Chest:   Sternotomy with dressing in place. Incision c/di.  Chest tubes x 3 in place connected to atrium. Pacer wires in place.   Abdominal: Soft. He exhibits no distension.   Colostomy in place in LLQ   Genitourinary:   Genitourinary Comments: Nunez in place   Musculoskeletal:   Left leg with ace bandage in place   Neurological:   sedated   Skin: Skin is warm and dry.   Vitals reviewed.    Lines/Drains/Airways     Drain                 Colostomy LLQ -- days         Urethral  Catheter 06/05/20 0726 Straight-tip;Temperature probe 2 days         Y Chest Tube 1 and 2 06/05/20 1300 Right Mediastinal 19 Fr. Mediastinal 19 Fr. 1 day         Y Chest Tube 3 and 4 06/05/20 1300 Left Pleural 19 Fr. 1 day          Line                 Pacer Wires 06/05/20 2 days          Peripheral Intravenous Line                 Peripheral IV - Single Lumen 06/05/20 0545 20 G Left Hand 2 days         Peripheral IV - Single Lumen 06/06/20 1215 20 G Posterior;Right Hand less than 1 day                Significant Labs:    CBC/Anemia Profile:  Recent Labs   Lab 06/05/20  1825  06/06/20  0308 06/06/20  0503 06/07/20  0541   WBC 15.00*  --  7.51  --  7.60   HGB 10.4*  --  9.1*  --  9.4*   HCT 32.3*   < > 30.2* 25* 30.4*     --  183  --  188   MCV 89  --  92  --  89   RDW 14.2  --  14.4  --  14.8*    < > = values in this interval not displayed.        Chemistries:  Recent Labs   Lab 06/05/20  1415  06/06/20  0308  06/06/20  1930 06/07/20  0541 06/07/20  0801     --  139  --   --  138  --    K 4.5   < > 4.1   < > 3.9 3.8 4.1   *  --  109  --   --  104  --    CO2 23  --  22*  --   --  24  --    BUN 20  --  23  --   --  23  --    CREATININE 0.8  --  0.8  --   --  0.8  --    CALCIUM 8.6*  --  8.2*  --   --  8.6*  --    MG 2.6  2.6  --  2.1  --   --  2.0  --    PHOS 3.7  3.7  --  2.7  --   --  1.8*  --     < > = values in this interval not displayed.       Significant Imaging:  I have reviewed and interpreted all pertinent imaging results/findings within the past 24 hours.      Assessment/Plan:     * S/P CABG (coronary artery bypass graft)  Jeffery Melton is a 62 y.o. male s/p CABG x 3 for CAD on 06/06/2020     -starting scheduled tylenol 1g q8 hrs and schduled Robaxin for back pain  -PRN oxycodone  -on 3L, wean for sats > 88%  -will remove mediastinal chest tubes today, keep L pleural chest tube and pacing wires one more day  -Aspirin 325/statin  -increasing metoprolol to 25 BID  -good UOP on lasix 40  BID, keeping burns until tomorrow  -starting potassium 20 BID  -cardiac diet with 1500mL fluid restriction  -bowel regimen  -periop vanc  -ambulate QID  -home Tuesday    Kailey Greer MD  Cardiothoracic Surgery  [facility]

## 2022-08-23 ENCOUNTER — APPOINTMENT (RX ONLY)
Dept: URBAN - METROPOLITAN AREA CLINIC 41 | Facility: CLINIC | Age: 32
Setting detail: DERMATOLOGY
End: 2022-08-23

## 2022-08-23 DIAGNOSIS — L70.0 ACNE VULGARIS: ICD-10-CM | Status: STABLE

## 2022-08-23 PROCEDURE — ? PRESCRIPTION MEDICATION MANAGEMENT

## 2022-08-23 PROCEDURE — ? PRESCRIPTION

## 2022-08-23 PROCEDURE — ? ADDITIONAL NOTES

## 2022-08-23 PROCEDURE — 99203 OFFICE O/P NEW LOW 30 MIN: CPT

## 2022-08-23 PROCEDURE — ? TREATMENT REGIMEN

## 2022-08-23 PROCEDURE — ? COUNSELING

## 2022-08-23 RX ORDER — AZELAIC ACID 0.15 G/G
GEL TOPICAL
Qty: 50 | Refills: 2 | Status: ERX | COMMUNITY
Start: 2022-08-23

## 2022-08-23 RX ORDER — TAZAROTENE 0.1 MG/G
CREAM CUTANEOUS
Qty: 30 | Refills: 2 | Status: ERX | COMMUNITY
Start: 2022-08-23

## 2022-08-23 RX ADMIN — AZELAIC ACID: 0.15 GEL TOPICAL at 00:00

## 2022-08-23 RX ADMIN — TAZAROTENE: 0.1 CREAM CUTANEOUS at 00:00

## 2022-08-23 ASSESSMENT — LOCATION SIMPLE DESCRIPTION DERM
LOCATION SIMPLE: RIGHT CHEEK
LOCATION SIMPLE: LEFT CHEEK

## 2022-08-23 ASSESSMENT — LOCATION DETAILED DESCRIPTION DERM
LOCATION DETAILED: LEFT INFERIOR CENTRAL MALAR CHEEK
LOCATION DETAILED: RIGHT CENTRAL MALAR CHEEK

## 2022-08-23 ASSESSMENT — LOCATION ZONE DERM: LOCATION ZONE: FACE

## 2022-08-23 NOTE — PROCEDURE: COUNSELING
Sarecycline Counseling: Patient advised regarding possible photosensitivity and discoloration of the teeth, skin, lips, tongue and gums.  Patient instructed to avoid sunlight, if possible.  When exposed to sunlight, patients should wear protective clothing, sunglasses, and sunscreen.  The patient was instructed to call the office immediately if the following severe adverse effects occur:  hearing changes, easy bruising/bleeding, severe headache, or vision changes.  The patient verbalized understanding of the proper use and possible adverse effects of sarecycline.  All of the patient's questions and concerns were addressed.
Topical Clindamycin Counseling: Patient counseled that this medication may cause skin irritation or allergic reactions.  In the event of skin irritation, the patient was advised to reduce the amount of the drug applied or use it less frequently.   The patient verbalized understanding of the proper use and possible adverse effects of clindamycin.  All of the patient's questions and concerns were addressed.
Tazorac Counseling:  Patient advised that medication is irritating and drying.  Patient may need to apply sparingly and wash off after an hour before eventually leaving it on overnight.  The patient verbalized understanding of the proper use and possible adverse effects of tazorac.  All of the patient's questions and concerns were addressed.
Minocycline Counseling: Patient advised regarding possible photosensitivity and discoloration of the teeth, skin, lips, tongue and gums.  Patient instructed to avoid sunlight, if possible.  When exposed to sunlight, patients should wear protective clothing, sunglasses, and sunscreen.  The patient was instructed to call the office immediately if the following severe adverse effects occur:  hearing changes, easy bruising/bleeding, severe headache, or vision changes.  The patient verbalized understanding of the proper use and possible adverse effects of minocycline.  All of the patient's questions and concerns were addressed.
Azithromycin Counseling:  I discussed with the patient the risks of azithromycin including but not limited to GI upset, allergic reaction, drug rash, diarrhea, and yeast infections.
Use Enhanced Medication Counseling?: No
Aklief Pregnancy And Lactation Text: It is unknown if this medication is safe to use during pregnancy.  It is unknown if this medication is excreted in breast milk.  Breastfeeding women should use the topical cream on the smallest area of the skin for the shortest time needed while breastfeeding.  Do not apply to nipple and areola.
Tetracycline Pregnancy And Lactation Text: This medication is Pregnancy Category D and not consider safe during pregnancy. It is also excreted in breast milk.
Dapsone Pregnancy And Lactation Text: This medication is Pregnancy Category C and is not considered safe during pregnancy or breast feeding.
Spironolactone Pregnancy And Lactation Text: This medication can cause feminization of the male fetus and should be avoided during pregnancy. The active metabolite is also found in breast milk.
Winlevi Pregnancy And Lactation Text: This medication is considered safe during pregnancy and breastfeeding.
Detail Level: Detailed
High Dose Vitamin A Counseling: Side effects reviewed, pt to contact office should one occur.
Topical Retinoid counseling:  Patient advised to apply a pea-sized amount only at bedtime and wait 30 minutes after washing their face before applying.  If too drying, patient may add a non-comedogenic moisturizer. The patient verbalized understanding of the proper use and possible adverse effects of retinoids.  All of the patient's questions and concerns were addressed.
Birth Control Pills Pregnancy And Lactation Text: This medication should be avoided if pregnant and for the first 30 days post-partum.
Topical Sulfur Applications Pregnancy And Lactation Text: This medication is Pregnancy Category C and has an unknown safety profile during pregnancy. It is unknown if this topical medication is excreted in breast milk.
Isotretinoin Counseling: Patient should get monthly blood tests, not donate blood, not drive at night if vision affected, not share medication, and not undergo elective surgery for 6 months after tx completed. Side effects reviewed, pt to contact office should one occur.
Benzoyl Peroxide Counseling: Patient counseled that medicine may cause skin irritation and bleach clothing.  In the event of skin irritation, the patient was advised to reduce the amount of the drug applied or use it less frequently.   The patient verbalized understanding of the proper use and possible adverse effects of benzoyl peroxide.  All of the patient's questions and concerns were addressed.
Bactrim Pregnancy And Lactation Text: This medication is Pregnancy Category D and is known to cause fetal risk.  It is also excreted in breast milk.
Topical Clindamycin Pregnancy And Lactation Text: This medication is Pregnancy Category B and is considered safe during pregnancy. It is unknown if it is excreted in breast milk.
Patient Specific Counseling (Will Not Stick From Patient To Patient): ALESSANDRO notes that pts skin care routine can be a bit intense with new topicals that were prescribed today. DK recommend introducing the retinol first and then adding in. \\nDK also recommends a chemical peel or micro needling to target pockmarks. Pt mentions that they have a friend that is an . DK mentions that cosmetic procedures and treatments will be out of pocket. ALESSANDRO notes that pt should avoid topicals for a week before any cosmetic treatment.
Erythromycin Counseling:  I discussed with the patient the risks of erythromycin including but not limited to GI upset, allergic reaction, drug rash, diarrhea, increase in liver enzymes, and yeast infections.
Azithromycin Pregnancy And Lactation Text: This medication is considered safe during pregnancy and is also secreted in breast milk.
Azelaic Acid Counseling: Patient counseled that medicine may cause skin irritation and to avoid applying near the eyes.  In the event of skin irritation, the patient was advised to reduce the amount of the drug applied or use it less frequently.   The patient verbalized understanding of the proper use and possible adverse effects of azelaic acid.  All of the patient's questions and concerns were addressed.
Aklief counseling:  Patient advised to apply a pea-sized amount only at bedtime and wait 30 minutes after washing their face before applying.  If too drying, patient may add a non-comedogenic moisturizer.  The most commonly reported side effects including irritation, redness, scaling, dryness, stinging, burning, itching, and increased risk of sunburn.  The patient verbalized understanding of the proper use and possible adverse effects of retinoids.  All of the patient's questions and concerns were addressed.
Tazorac Pregnancy And Lactation Text: This medication is not safe during pregnancy. It is unknown if this medication is excreted in breast milk.
Doxycycline Counseling:  Patient counseled regarding possible photosensitivity and increased risk for sunburn.  Patient instructed to avoid sunlight, if possible.  When exposed to sunlight, patients should wear protective clothing, sunglasses, and sunscreen.  The patient was instructed to call the office immediately if the following severe adverse effects occur:  hearing changes, easy bruising/bleeding, severe headache, or vision changes.  The patient verbalized understanding of the proper use and possible adverse effects of doxycycline.  All of the patient's questions and concerns were addressed.
Topical Retinoid Pregnancy And Lactation Text: This medication is Pregnancy Category C. It is unknown if this medication is excreted in breast milk.
High Dose Vitamin A Pregnancy And Lactation Text: High dose vitamin A therapy is contraindicated during pregnancy and breast feeding.
Dapsone Counseling: I discussed with the patient the risks of dapsone including but not limited to hemolytic anemia, agranulocytosis, rashes, methemoglobinemia, kidney failure, peripheral neuropathy, headaches, GI upset, and liver toxicity.  Patients who start dapsone require monitoring including baseline LFTs and weekly CBCs for the first month, then every month thereafter.  The patient verbalized understanding of the proper use and possible adverse effects of dapsone.  All of the patient's questions and concerns were addressed.
Isotretinoin Pregnancy And Lactation Text: This medication is Pregnancy Category X and is considered extremely dangerous during pregnancy. It is unknown if it is excreted in breast milk.
Tetracycline Counseling: Patient counseled regarding possible photosensitivity and increased risk for sunburn.  Patient instructed to avoid sunlight, if possible.  When exposed to sunlight, patients should wear protective clothing, sunglasses, and sunscreen.  The patient was instructed to call the office immediately if the following severe adverse effects occur:  hearing changes, easy bruising/bleeding, severe headache, or vision changes.  The patient verbalized understanding of the proper use and possible adverse effects of tetracycline.  All of the patient's questions and concerns were addressed. Patient understands to avoid pregnancy while on therapy due to potential birth defects.
Winlevi Counseling:  I discussed with the patient the risks of topical clascoterone including but not limited to erythema, scaling, itching, and stinging. Patient voiced their understanding.
Spironolactone Counseling: Patient advised regarding risks of diarrhea, abdominal pain, hyperkalemia, birth defects (for female patients), liver toxicity and renal toxicity. The patient may need blood work to monitor liver and kidney function and potassium levels while on therapy. The patient verbalized understanding of the proper use and possible adverse effects of spironolactone.  All of the patient's questions and concerns were addressed.
Benzoyl Peroxide Pregnancy And Lactation Text: This medication is Pregnancy Category C. It is unknown if benzoyl peroxide is excreted in breast milk.
Birth Control Pills Counseling: Birth Control Pill Counseling: I discussed with the patient the potential side effects of OCPs including but not limited to increased risk of stroke, heart attack, thrombophlebitis, deep venous thrombosis, hepatic adenomas, breast changes, GI upset, headaches, and depression.  The patient verbalized understanding of the proper use and possible adverse effects of OCPs. All of the patient's questions and concerns were addressed.
Azelaic Acid Pregnancy And Lactation Text: This medication is considered safe during pregnancy and breast feeding.
Erythromycin Pregnancy And Lactation Text: This medication is Pregnancy Category B and is considered safe during pregnancy. It is also excreted in breast milk.
Bactrim Counseling:  I discussed with the patient the risks of sulfa antibiotics including but not limited to GI upset, allergic reaction, drug rash, diarrhea, dizziness, photosensitivity, and yeast infections.  Rarely, more serious reactions can occur including but not limited to aplastic anemia, agranulocytosis, methemoglobinemia, blood dyscrasias, liver or kidney failure, lung infiltrates or desquamative/blistering drug rashes.
Doxycycline Pregnancy And Lactation Text: This medication is Pregnancy Category D and not consider safe during pregnancy. It is also excreted in breast milk but is considered safe for shorter treatment courses.
Topical Sulfur Applications Counseling: Topical Sulfur Counseling: Patient counseled that this medication may cause skin irritation or allergic reactions.  In the event of skin irritation, the patient was advised to reduce the amount of the drug applied or use it less frequently.   The patient verbalized understanding of the proper use and possible adverse effects of topical sulfur application.  All of the patient's questions and concerns were addressed.

## 2022-08-23 NOTE — PROCEDURE: PRESCRIPTION MEDICATION MANAGEMENT
Render In Strict Bullet Format?: No
Initiate Treatment: Azelaic acid qam\\nTazorac cream 0.1 qhs
Detail Level: Zone

## 2022-08-23 NOTE — HPI: PIMPLES (ACNE)
Is This A New Presentation, Or A Follow-Up?: Acne
Additional Comments (Use Complete Sentences): New Pt here for acne, dark marks, and scarring. Pt has been using tretinoin 0.1% but doesn’t seem any improvement as pt has been using products for a year. Pt also notes they’ve been on accutane when they were younger. Pt notes discoloration on the lips and has used hydroquinone

## 2022-11-29 ENCOUNTER — APPOINTMENT (RX ONLY)
Dept: URBAN - METROPOLITAN AREA CLINIC 41 | Facility: CLINIC | Age: 32
Setting detail: DERMATOLOGY
End: 2022-11-29

## 2022-11-29 DIAGNOSIS — Z41.9 ENCOUNTER FOR PROCEDURE FOR PURPOSES OTHER THAN REMEDYING HEALTH STATE, UNSPECIFIED: ICD-10-CM

## 2022-11-29 DIAGNOSIS — L70.0 ACNE VULGARIS: ICD-10-CM | Status: STABLE

## 2022-11-29 PROCEDURE — 99213 OFFICE O/P EST LOW 20 MIN: CPT

## 2022-11-29 PROCEDURE — ? PRESCRIPTION MEDICATION MANAGEMENT

## 2022-11-29 PROCEDURE — ? COSMETIC CONSULTATION: CHEMICAL PEELS

## 2022-11-29 PROCEDURE — ? COUNSELING

## 2022-11-29 PROCEDURE — ? ADDITIONAL NOTES

## 2022-11-29 PROCEDURE — ? COSMETIC CONSULTATION: ACNE SCARRING

## 2022-11-29 ASSESSMENT — LOCATION DETAILED DESCRIPTION DERM
LOCATION DETAILED: LEFT INFERIOR CENTRAL MALAR CHEEK
LOCATION DETAILED: RIGHT CENTRAL MALAR CHEEK

## 2022-11-29 ASSESSMENT — LOCATION ZONE DERM: LOCATION ZONE: FACE

## 2022-11-29 ASSESSMENT — LOCATION SIMPLE DESCRIPTION DERM
LOCATION SIMPLE: RIGHT CHEEK
LOCATION SIMPLE: LEFT CHEEK

## 2022-11-29 NOTE — PROCEDURE: PRESCRIPTION MEDICATION MANAGEMENT
Render In Strict Bullet Format?: No
Detail Level: Zone
Continue Regimen: Azelaic acid qam\\nTazorac cream 0.1 qhs

## 2022-11-29 NOTE — PROCEDURE: COUNSELING
Sarecycline Counseling: Patient advised regarding possible photosensitivity and discoloration of the teeth, skin, lips, tongue and gums.  Patient instructed to avoid sunlight, if possible.  When exposed to sunlight, patients should wear protective clothing, sunglasses, and sunscreen.  The patient was instructed to call the office immediately if the following severe adverse effects occur:  hearing changes, easy bruising/bleeding, severe headache, or vision changes.  The patient verbalized understanding of the proper use and possible adverse effects of sarecycline.  All of the patient's questions and concerns were addressed.
Topical Clindamycin Counseling: Patient counseled that this medication may cause skin irritation or allergic reactions.  In the event of skin irritation, the patient was advised to reduce the amount of the drug applied or use it less frequently.   The patient verbalized understanding of the proper use and possible adverse effects of clindamycin.  All of the patient's questions and concerns were addressed.
Tazorac Counseling:  Patient advised that medication is irritating and drying.  Patient may need to apply sparingly and wash off after an hour before eventually leaving it on overnight.  The patient verbalized understanding of the proper use and possible adverse effects of tazorac.  All of the patient's questions and concerns were addressed.
Minocycline Counseling: Patient advised regarding possible photosensitivity and discoloration of the teeth, skin, lips, tongue and gums.  Patient instructed to avoid sunlight, if possible.  When exposed to sunlight, patients should wear protective clothing, sunglasses, and sunscreen.  The patient was instructed to call the office immediately if the following severe adverse effects occur:  hearing changes, easy bruising/bleeding, severe headache, or vision changes.  The patient verbalized understanding of the proper use and possible adverse effects of minocycline.  All of the patient's questions and concerns were addressed.
Azithromycin Counseling:  I discussed with the patient the risks of azithromycin including but not limited to GI upset, allergic reaction, drug rash, diarrhea, and yeast infections.
Use Enhanced Medication Counseling?: No
Aklief Pregnancy And Lactation Text: It is unknown if this medication is safe to use during pregnancy.  It is unknown if this medication is excreted in breast milk.  Breastfeeding women should use the topical cream on the smallest area of the skin for the shortest time needed while breastfeeding.  Do not apply to nipple and areola.
Tetracycline Pregnancy And Lactation Text: This medication is Pregnancy Category D and not consider safe during pregnancy. It is also excreted in breast milk.
Dapsone Pregnancy And Lactation Text: This medication is Pregnancy Category C and is not considered safe during pregnancy or breast feeding.
Spironolactone Pregnancy And Lactation Text: This medication can cause feminization of the male fetus and should be avoided during pregnancy. The active metabolite is also found in breast milk.
Winlevi Pregnancy And Lactation Text: This medication is considered safe during pregnancy and breastfeeding.
Detail Level: Detailed
High Dose Vitamin A Counseling: Side effects reviewed, pt to contact office should one occur.
Topical Retinoid counseling:  Patient advised to apply a pea-sized amount only at bedtime and wait 30 minutes after washing their face before applying.  If too drying, patient may add a non-comedogenic moisturizer. The patient verbalized understanding of the proper use and possible adverse effects of retinoids.  All of the patient's questions and concerns were addressed.
Birth Control Pills Pregnancy And Lactation Text: This medication should be avoided if pregnant and for the first 30 days post-partum.
Topical Sulfur Applications Pregnancy And Lactation Text: This medication is Pregnancy Category C and has an unknown safety profile during pregnancy. It is unknown if this topical medication is excreted in breast milk.
Isotretinoin Counseling: Patient should get monthly blood tests, not donate blood, not drive at night if vision affected, not share medication, and not undergo elective surgery for 6 months after tx completed. Side effects reviewed, pt to contact office should one occur.
Benzoyl Peroxide Counseling: Patient counseled that medicine may cause skin irritation and bleach clothing.  In the event of skin irritation, the patient was advised to reduce the amount of the drug applied or use it less frequently.   The patient verbalized understanding of the proper use and possible adverse effects of benzoyl peroxide.  All of the patient's questions and concerns were addressed.
Bactrim Pregnancy And Lactation Text: This medication is Pregnancy Category D and is known to cause fetal risk.  It is also excreted in breast milk.
Topical Clindamycin Pregnancy And Lactation Text: This medication is Pregnancy Category B and is considered safe during pregnancy. It is unknown if it is excreted in breast milk.
Patient Specific Counseling (Will Not Stick From Patient To Patient): -\\nPt notes concern about scarring but notes acne has improved. Pt asks about taking oral medication but DK notes that acne is stable right now and an oral antibiotic is not necessary. \\n\\nDK recommends chemical peel or microneedling to help with scarring. Pt notes that they are doing a set of 5 chemical peels with  at another location. \\n\\nDK recommends VI peel for pt
Erythromycin Counseling:  I discussed with the patient the risks of erythromycin including but not limited to GI upset, allergic reaction, drug rash, diarrhea, increase in liver enzymes, and yeast infections.
Azithromycin Pregnancy And Lactation Text: This medication is considered safe during pregnancy and is also secreted in breast milk.
Azelaic Acid Counseling: Patient counseled that medicine may cause skin irritation and to avoid applying near the eyes.  In the event of skin irritation, the patient was advised to reduce the amount of the drug applied or use it less frequently.   The patient verbalized understanding of the proper use and possible adverse effects of azelaic acid.  All of the patient's questions and concerns were addressed.
Aklief counseling:  Patient advised to apply a pea-sized amount only at bedtime and wait 30 minutes after washing their face before applying.  If too drying, patient may add a non-comedogenic moisturizer.  The most commonly reported side effects including irritation, redness, scaling, dryness, stinging, burning, itching, and increased risk of sunburn.  The patient verbalized understanding of the proper use and possible adverse effects of retinoids.  All of the patient's questions and concerns were addressed.
Tazorac Pregnancy And Lactation Text: This medication is not safe during pregnancy. It is unknown if this medication is excreted in breast milk.
Doxycycline Counseling:  Patient counseled regarding possible photosensitivity and increased risk for sunburn.  Patient instructed to avoid sunlight, if possible.  When exposed to sunlight, patients should wear protective clothing, sunglasses, and sunscreen.  The patient was instructed to call the office immediately if the following severe adverse effects occur:  hearing changes, easy bruising/bleeding, severe headache, or vision changes.  The patient verbalized understanding of the proper use and possible adverse effects of doxycycline.  All of the patient's questions and concerns were addressed.
Topical Retinoid Pregnancy And Lactation Text: This medication is Pregnancy Category C. It is unknown if this medication is excreted in breast milk.
High Dose Vitamin A Pregnancy And Lactation Text: High dose vitamin A therapy is contraindicated during pregnancy and breast feeding.
Dapsone Counseling: I discussed with the patient the risks of dapsone including but not limited to hemolytic anemia, agranulocytosis, rashes, methemoglobinemia, kidney failure, peripheral neuropathy, headaches, GI upset, and liver toxicity.  Patients who start dapsone require monitoring including baseline LFTs and weekly CBCs for the first month, then every month thereafter.  The patient verbalized understanding of the proper use and possible adverse effects of dapsone.  All of the patient's questions and concerns were addressed.
Isotretinoin Pregnancy And Lactation Text: This medication is Pregnancy Category X and is considered extremely dangerous during pregnancy. It is unknown if it is excreted in breast milk.
Tetracycline Counseling: Patient counseled regarding possible photosensitivity and increased risk for sunburn.  Patient instructed to avoid sunlight, if possible.  When exposed to sunlight, patients should wear protective clothing, sunglasses, and sunscreen.  The patient was instructed to call the office immediately if the following severe adverse effects occur:  hearing changes, easy bruising/bleeding, severe headache, or vision changes.  The patient verbalized understanding of the proper use and possible adverse effects of tetracycline.  All of the patient's questions and concerns were addressed. Patient understands to avoid pregnancy while on therapy due to potential birth defects.
Winlevi Counseling:  I discussed with the patient the risks of topical clascoterone including but not limited to erythema, scaling, itching, and stinging. Patient voiced their understanding.
Spironolactone Counseling: Patient advised regarding risks of diarrhea, abdominal pain, hyperkalemia, birth defects (for female patients), liver toxicity and renal toxicity. The patient may need blood work to monitor liver and kidney function and potassium levels while on therapy. The patient verbalized understanding of the proper use and possible adverse effects of spironolactone.  All of the patient's questions and concerns were addressed.
Benzoyl Peroxide Pregnancy And Lactation Text: This medication is Pregnancy Category C. It is unknown if benzoyl peroxide is excreted in breast milk.
Birth Control Pills Counseling: Birth Control Pill Counseling: I discussed with the patient the potential side effects of OCPs including but not limited to increased risk of stroke, heart attack, thrombophlebitis, deep venous thrombosis, hepatic adenomas, breast changes, GI upset, headaches, and depression.  The patient verbalized understanding of the proper use and possible adverse effects of OCPs. All of the patient's questions and concerns were addressed.
Azelaic Acid Pregnancy And Lactation Text: This medication is considered safe during pregnancy and breast feeding.
Erythromycin Pregnancy And Lactation Text: This medication is Pregnancy Category B and is considered safe during pregnancy. It is also excreted in breast milk.
Bactrim Counseling:  I discussed with the patient the risks of sulfa antibiotics including but not limited to GI upset, allergic reaction, drug rash, diarrhea, dizziness, photosensitivity, and yeast infections.  Rarely, more serious reactions can occur including but not limited to aplastic anemia, agranulocytosis, methemoglobinemia, blood dyscrasias, liver or kidney failure, lung infiltrates or desquamative/blistering drug rashes.
Doxycycline Pregnancy And Lactation Text: This medication is Pregnancy Category D and not consider safe during pregnancy. It is also excreted in breast milk but is considered safe for shorter treatment courses.
Topical Sulfur Applications Counseling: Topical Sulfur Counseling: Patient counseled that this medication may cause skin irritation or allergic reactions.  In the event of skin irritation, the patient was advised to reduce the amount of the drug applied or use it less frequently.   The patient verbalized understanding of the proper use and possible adverse effects of topical sulfur application.  All of the patient's questions and concerns were addressed.

## 2023-02-13 NOTE — PROGRESS NOTES
Chest x ray clear. Continue treatment for bronchitis. Good news. Please call office with questions. Hydroxyzine Counseling: Patient advised that the medication is sedating and not to drive a car after taking this medication.  Patient informed of potential adverse effects including but not limited to dry mouth, urinary retention, and blurry vision.  The patient verbalized understanding of the proper use and possible adverse effects of hydroxyzine.  All of the patient's questions and concerns were addressed.

## 2023-08-21 ENCOUNTER — APPOINTMENT (RX ONLY)
Dept: URBAN - METROPOLITAN AREA CLINIC 41 | Facility: CLINIC | Age: 33
Setting detail: DERMATOLOGY
End: 2023-08-21

## 2023-08-21 DIAGNOSIS — Z86.19 PERSONAL HISTORY OF OTHER INFECTIOUS AND PARASITIC DISEASES: ICD-10-CM | Status: RESOLVED

## 2023-08-21 DIAGNOSIS — L70.0 ACNE VULGARIS: ICD-10-CM | Status: INADEQUATELY CONTROLLED

## 2023-08-21 DIAGNOSIS — L81.0 POSTINFLAMMATORY HYPERPIGMENTATION: ICD-10-CM | Status: INADEQUATELY CONTROLLED

## 2023-08-21 PROCEDURE — ? PRESCRIPTION MEDICATION MANAGEMENT

## 2023-08-21 PROCEDURE — ? PRESCRIPTION

## 2023-08-21 PROCEDURE — 99214 OFFICE O/P EST MOD 30 MIN: CPT

## 2023-08-21 PROCEDURE — ? COUNSELING

## 2023-08-21 RX ORDER — AMMONIUM LACTATE 12 %
CREAM (GRAM) TOPICAL
Qty: 140 | Refills: 2 | Status: ERX | COMMUNITY
Start: 2023-08-21

## 2023-08-21 RX ORDER — DAPSONE 75 MG/G
GEL TOPICAL
Qty: 90 | Refills: 2 | Status: ERX | COMMUNITY
Start: 2023-08-21

## 2023-08-21 RX ORDER — SULFACETAMIDE SODIUM, SULFUR 98; 48 MG/G; MG/G
LIQUID TOPICAL QD
Qty: 285 | Refills: 2 | Status: ERX | COMMUNITY
Start: 2023-08-21

## 2023-08-21 RX ADMIN — SULFACETAMIDE SODIUM, SULFUR: 98; 48 LIQUID TOPICAL at 00:00

## 2023-08-21 RX ADMIN — Medication: at 00:00

## 2023-08-21 RX ADMIN — DAPSONE: 75 GEL TOPICAL at 00:00

## 2023-08-21 ASSESSMENT — LOCATION ZONE DERM
LOCATION ZONE: FACE
LOCATION ZONE: LIP
LOCATION ZONE: LEG
LOCATION ZONE: TRUNK
LOCATION ZONE: ARM

## 2023-08-21 ASSESSMENT — LOCATION SIMPLE DESCRIPTION DERM
LOCATION SIMPLE: LEFT CHEEK
LOCATION SIMPLE: LEFT PRETIBIAL REGION
LOCATION SIMPLE: RIGHT CHEEK
LOCATION SIMPLE: RIGHT SHOULDER
LOCATION SIMPLE: LEFT UPPER ARM
LOCATION SIMPLE: C5 LEFT ANTERIOR DERMATOME
LOCATION SIMPLE: RIGHT KNEE
LOCATION SIMPLE: RIGHT LIP
LOCATION SIMPLE: RIGHT FOREHEAD

## 2023-08-21 ASSESSMENT — LOCATION DETAILED DESCRIPTION DERM
LOCATION DETAILED: RIGHT CENTRAL MALAR CHEEK
LOCATION DETAILED: RIGHT KNEE
LOCATION DETAILED: RIGHT INFERIOR MEDIAL FOREHEAD
LOCATION DETAILED: RIGHT ANTERIOR SHOULDER
LOCATION DETAILED: LEFT CENTRAL MALAR CHEEK
LOCATION DETAILED: LEFT ANTERIOR PROXIMAL UPPER ARM
LOCATION DETAILED: RIGHT LOWER CUTANEOUS LIP
LOCATION DETAILED: LEFT PROXIMAL PRETIBIAL REGION
LOCATION DETAILED: C5 LEFT ANTERIOR DERMATOME

## 2023-08-21 NOTE — PROCEDURE: COUNSELING
Detail Level: Detailed
Patient Specific Counseling (Will Not Stick From Patient To Patient): Pt notes skin feels back to normal. Denies lingering symptoms.  Resolved on exam.
Doxycycline Counseling:  Patient counseled regarding possible photosensitivity and increased risk for sunburn.  Patient instructed to avoid sunlight, if possible.  When exposed to sunlight, patients should wear protective clothing, sunglasses, and sunscreen.  The patient was instructed to call the office immediately if the following severe adverse effects occur:  hearing changes, easy bruising/bleeding, severe headache, or vision changes.  The patient verbalized understanding of the proper use and possible adverse effects of doxycycline.  All of the patient's questions and concerns were addressed.
High Dose Vitamin A Pregnancy And Lactation Text: High dose vitamin A therapy is contraindicated during pregnancy and breast feeding.
Benzoyl Peroxide Pregnancy And Lactation Text: This medication is Pregnancy Category C. It is unknown if benzoyl peroxide is excreted in breast milk.
Tetracycline Counseling: Patient counseled regarding possible photosensitivity and increased risk for sunburn.  Patient instructed to avoid sunlight, if possible.  When exposed to sunlight, patients should wear protective clothing, sunglasses, and sunscreen.  The patient was instructed to call the office immediately if the following severe adverse effects occur:  hearing changes, easy bruising/bleeding, severe headache, or vision changes.  The patient verbalized understanding of the proper use and possible adverse effects of tetracycline.  All of the patient's questions and concerns were addressed. Patient understands to avoid pregnancy while on therapy due to potential birth defects.
Aklief counseling:  Patient advised to apply a pea-sized amount only at bedtime and wait 30 minutes after washing their face before applying.  If too drying, patient may add a non-comedogenic moisturizer.  The most commonly reported side effects including irritation, redness, scaling, dryness, stinging, burning, itching, and increased risk of sunburn.  The patient verbalized understanding of the proper use and possible adverse effects of retinoids.  All of the patient's questions and concerns were addressed.
Azithromycin Pregnancy And Lactation Text: This medication is considered safe during pregnancy and is also secreted in breast milk.
Include Pregnancy/Lactation Warning?: No
Topical Retinoid Pregnancy And Lactation Text: This medication is Pregnancy Category C. It is unknown if this medication is excreted in breast milk.
Winlevi Counseling:  I discussed with the patient the risks of topical clascoterone including but not limited to erythema, scaling, itching, and stinging. Patient voiced their understanding.
Bactrim Pregnancy And Lactation Text: This medication is Pregnancy Category D and is known to cause fetal risk.  It is also excreted in breast milk.
Erythromycin Counseling:  I discussed with the patient the risks of erythromycin including but not limited to GI upset, allergic reaction, drug rash, diarrhea, increase in liver enzymes, and yeast infections.
Minocycline Pregnancy And Lactation Text: This medication is Pregnancy Category D and not consider safe during pregnancy. It is also excreted in breast milk.
Erythromycin Pregnancy And Lactation Text: This medication is Pregnancy Category B and is considered safe during pregnancy. It is also excreted in breast milk.
Dapsone Counseling: I discussed with the patient the risks of dapsone including but not limited to hemolytic anemia, agranulocytosis, rashes, methemoglobinemia, kidney failure, peripheral neuropathy, headaches, GI upset, and liver toxicity.  Patients who start dapsone require monitoring including baseline LFTs and weekly CBCs for the first month, then every month thereafter.  The patient verbalized understanding of the proper use and possible adverse effects of dapsone.  All of the patient's questions and concerns were addressed.
Isotretinoin Pregnancy And Lactation Text: This medication is Pregnancy Category X and is considered extremely dangerous during pregnancy. It is unknown if it is excreted in breast milk.
Birth Control Pills Counseling: Birth Control Pill Counseling: I discussed with the patient the potential side effects of OCPs including but not limited to increased risk of stroke, heart attack, thrombophlebitis, deep venous thrombosis, hepatic adenomas, breast changes, GI upset, headaches, and depression.  The patient verbalized understanding of the proper use and possible adverse effects of OCPs. All of the patient's questions and concerns were addressed.
Azelaic Acid Pregnancy And Lactation Text: This medication is considered safe during pregnancy and breast feeding.
Spironolactone Counseling: Patient advised regarding risks of diarrhea, abdominal pain, hyperkalemia, birth defects (for female patients), liver toxicity and renal toxicity. The patient may need blood work to monitor liver and kidney function and potassium levels while on therapy. The patient verbalized understanding of the proper use and possible adverse effects of spironolactone.  All of the patient's questions and concerns were addressed.
Topical Clindamycin Counseling: Patient counseled that this medication may cause skin irritation or allergic reactions.  In the event of skin irritation, the patient was advised to reduce the amount of the drug applied or use it less frequently.   The patient verbalized understanding of the proper use and possible adverse effects of clindamycin.  All of the patient's questions and concerns were addressed.
Topical Sulfur Applications Counseling: Topical Sulfur Counseling: Patient counseled that this medication may cause skin irritation or allergic reactions.  In the event of skin irritation, the patient was advised to reduce the amount of the drug applied or use it less frequently.   The patient verbalized understanding of the proper use and possible adverse effects of topical sulfur application.  All of the patient's questions and concerns were addressed.
Topical Retinoid counseling:  Patient advised to apply a pea-sized amount only at bedtime and wait 30 minutes after washing their face before applying.  If too drying, patient may add a non-comedogenic moisturizer. The patient verbalized understanding of the proper use and possible adverse effects of retinoids.  All of the patient's questions and concerns were addressed.
Topical Sulfur Applications Pregnancy And Lactation Text: This medication is Pregnancy Category C and has an unknown safety profile during pregnancy. It is unknown if this topical medication is excreted in breast milk.
Bactrim Counseling:  I discussed with the patient the risks of sulfa antibiotics including but not limited to GI upset, allergic reaction, drug rash, diarrhea, dizziness, photosensitivity, and yeast infections.  Rarely, more serious reactions can occur including but not limited to aplastic anemia, agranulocytosis, methemoglobinemia, blood dyscrasias, liver or kidney failure, lung infiltrates or desquamative/blistering drug rashes.
Doxycycline Pregnancy And Lactation Text: This medication is Pregnancy Category D and not consider safe during pregnancy. It is also excreted in breast milk but is considered safe for shorter treatment courses.
Minocycline Counseling: Patient advised regarding possible photosensitivity and discoloration of the teeth, skin, lips, tongue and gums.  Patient instructed to avoid sunlight, if possible.  When exposed to sunlight, patients should wear protective clothing, sunglasses, and sunscreen.  The patient was instructed to call the office immediately if the following severe adverse effects occur:  hearing changes, easy bruising/bleeding, severe headache, or vision changes.  The patient verbalized understanding of the proper use and possible adverse effects of minocycline.  All of the patient's questions and concerns were addressed.
Aklief Pregnancy And Lactation Text: It is unknown if this medication is safe to use during pregnancy.  It is unknown if this medication is excreted in breast milk.  Breastfeeding women should use the topical cream on the smallest area of the skin for the shortest time needed while breastfeeding.  Do not apply to nipple and areola.
Sarecycline Counseling: Patient advised regarding possible photosensitivity and discoloration of the teeth, skin, lips, tongue and gums.  Patient instructed to avoid sunlight, if possible.  When exposed to sunlight, patients should wear protective clothing, sunglasses, and sunscreen.  The patient was instructed to call the office immediately if the following severe adverse effects occur:  hearing changes, easy bruising/bleeding, severe headache, or vision changes.  The patient verbalized understanding of the proper use and possible adverse effects of sarecycline.  All of the patient's questions and concerns were addressed.
Tazorac Counseling:  Patient advised that medication is irritating and drying.  Patient may need to apply sparingly and wash off after an hour before eventually leaving it on overnight.  The patient verbalized understanding of the proper use and possible adverse effects of tazorac.  All of the patient's questions and concerns were addressed.
Winlevi Pregnancy And Lactation Text: This medication is considered safe during pregnancy and breastfeeding.
Detail Level: Zone
Isotretinoin Counseling: Patient should get monthly blood tests, not donate blood, not drive at night if vision affected, not share medication, and not undergo elective surgery for 6 months after tx completed. Side effects reviewed, pt to contact office should one occur.
Azelaic Acid Counseling: Patient counseled that medicine may cause skin irritation and to avoid applying near the eyes.  In the event of skin irritation, the patient was advised to reduce the amount of the drug applied or use it less frequently.   The patient verbalized understanding of the proper use and possible adverse effects of azelaic acid.  All of the patient's questions and concerns were addressed.
Tazorac Pregnancy And Lactation Text: This medication is not safe during pregnancy. It is unknown if this medication is excreted in breast milk.
Patient Specific Counseling (Will Not Stick From Patient To Patient): -\\nSS counsels pt to avoid using scrub in skincare regimen.
Birth Control Pills Pregnancy And Lactation Text: This medication should be avoided if pregnant and for the first 30 days post-partum.
Azithromycin Counseling:  I discussed with the patient the risks of azithromycin including but not limited to GI upset, allergic reaction, drug rash, diarrhea, and yeast infections.
Topical Clindamycin Pregnancy And Lactation Text: This medication is Pregnancy Category B and is considered safe during pregnancy. It is unknown if it is excreted in breast milk.
Spironolactone Pregnancy And Lactation Text: This medication can cause feminization of the male fetus and should be avoided during pregnancy. The active metabolite is also found in breast milk.
Benzoyl Peroxide Counseling: Patient counseled that medicine may cause skin irritation and bleach clothing.  In the event of skin irritation, the patient was advised to reduce the amount of the drug applied or use it less frequently.   The patient verbalized understanding of the proper use and possible adverse effects of benzoyl peroxide.  All of the patient's questions and concerns were addressed.
Dapsone Pregnancy And Lactation Text: This medication is Pregnancy Category C and is not considered safe during pregnancy or breast feeding.
High Dose Vitamin A Counseling: Side effects reviewed, pt to contact office should one occur.

## 2023-08-21 NOTE — HPI: SKIN IRRITATION
How Severe Is Your Skin Irritation?: moderate
Additional History: Est pt here with skin irritation \\nPt believes the irritation is from azeleic acid\\nPt feels acne is well controlled but this rxn isn’t

## 2023-08-21 NOTE — HPI: SCAR
How Severe Is Your Scar?: moderate
Is This A New Presentation, Or A Follow-Up?: Scar
Additional History: Est pt here for scarring following shingles \\nPt interested in ways of reducing scarring

## 2023-08-21 NOTE — PROCEDURE: PRESCRIPTION MEDICATION MANAGEMENT
Detail Level: Zone
Initiate Treatment: Ammonium lactate 12% cream QD
Render In Strict Bullet Format?: No
Continue Regimen: Tazorac QHS
Initiate Treatment: Dapsone 7.5 QAM\\nSulfur wash QD in shower
Modify Regimen: Azaleic acid QAM slowly reinitiate as skin begins to neutralize

## 2024-07-24 ENCOUNTER — APPOINTMENT (RX ONLY)
Dept: URBAN - METROPOLITAN AREA CLINIC 41 | Facility: CLINIC | Age: 34
Setting detail: DERMATOLOGY
End: 2024-07-24

## 2024-07-24 DIAGNOSIS — Z41.9 ENCOUNTER FOR PROCEDURE FOR PURPOSES OTHER THAN REMEDYING HEALTH STATE, UNSPECIFIED: ICD-10-CM

## 2024-07-24 PROCEDURE — ? COSMETIC CONSULTATION: CHEMICAL PEELS

## 2024-07-24 PROCEDURE — ? ADDITIONAL NOTES

## 2024-07-24 PROCEDURE — ? COSMETIC CONSULTATION: LASER RESURFACING

## 2024-07-24 NOTE — PROCEDURE: COSMETIC CONSULTATION: LASER RESURFACING
Consultation Charge $ (Use Numbers Only, No Special Characters Or $): 100
Procedure Quote $ (Will Render In Note. Use Numbers Only, No Text Please.): 2000
Patient Specific Counseling (Will Not Stick From Patient To Patient): -\\n\\nEm counsels patient on Venus viva
Detail Level: Zone
Send Procedure Quote As Charge: No

## 2024-07-24 NOTE — PROCEDURE: ADDITIONAL NOTES
Render Risk Assessment In Note?: no
Detail Level: Detailed
Additional Notes: -\\n\\nEm notes that chemical peel would be best for discoloration and brown spots, patient notes he has gotten it done in the winter before\\n\\nEm notes patient can do laser resurfacing and chemical peels 2 weeks apart\\n\\nEm notes it is $429 for a chemical peel\\nEm quotes $2000 for 4 Venus viva treatments \\n\\nEm counsels patient to not use azelaic acid for 2 weeks after laser tx\\n\\nPatient opts to not pay for package now and pay at next appt